# Patient Record
Sex: MALE | Race: WHITE | Employment: OTHER | URBAN - METROPOLITAN AREA
[De-identification: names, ages, dates, MRNs, and addresses within clinical notes are randomized per-mention and may not be internally consistent; named-entity substitution may affect disease eponyms.]

---

## 2017-01-04 ENCOUNTER — OFFICE VISIT (OUTPATIENT)
Dept: FAMILY MEDICINE CLINIC | Age: 82
End: 2017-01-04

## 2017-01-04 VITALS
DIASTOLIC BLOOD PRESSURE: 64 MMHG | OXYGEN SATURATION: 98 % | BODY MASS INDEX: 27.76 KG/M2 | SYSTOLIC BLOOD PRESSURE: 128 MMHG | HEART RATE: 78 BPM | WEIGHT: 188 LBS

## 2017-01-04 DIAGNOSIS — I50.9 ACUTE ON CHRONIC CONGESTIVE HEART FAILURE, UNSPECIFIED CONGESTIVE HEART FAILURE TYPE: Primary | ICD-10-CM

## 2017-01-04 PROCEDURE — 99214 OFFICE O/P EST MOD 30 MIN: CPT | Performed by: FAMILY MEDICINE

## 2017-01-24 ENCOUNTER — OFFICE VISIT (OUTPATIENT)
Dept: CARDIOLOGY CLINIC | Age: 82
End: 2017-01-24

## 2017-01-24 VITALS
SYSTOLIC BLOOD PRESSURE: 100 MMHG | HEART RATE: 80 BPM | BODY MASS INDEX: 27.08 KG/M2 | HEIGHT: 69 IN | WEIGHT: 182.8 LBS | DIASTOLIC BLOOD PRESSURE: 58 MMHG

## 2017-01-24 DIAGNOSIS — I50.23 ACUTE ON CHRONIC SYSTOLIC CONGESTIVE HEART FAILURE (HCC): Primary | ICD-10-CM

## 2017-01-24 DIAGNOSIS — I25.10 CORONARY ARTERY DISEASE INVOLVING NATIVE CORONARY ARTERY OF NATIVE HEART WITHOUT ANGINA PECTORIS: ICD-10-CM

## 2017-01-24 DIAGNOSIS — I10 ESSENTIAL HYPERTENSION, BENIGN: ICD-10-CM

## 2017-01-24 DIAGNOSIS — R06.09 DOE (DYSPNEA ON EXERTION): ICD-10-CM

## 2017-01-24 PROCEDURE — 99214 OFFICE O/P EST MOD 30 MIN: CPT | Performed by: INTERNAL MEDICINE

## 2017-02-14 DIAGNOSIS — I10 ESSENTIAL HYPERTENSION, BENIGN: ICD-10-CM

## 2017-02-14 RX ORDER — TERAZOSIN 5 MG/1
5 CAPSULE ORAL NIGHTLY
Qty: 30 CAPSULE | Refills: 3 | Status: SHIPPED | OUTPATIENT
Start: 2017-02-14 | End: 2017-06-06 | Stop reason: SDUPTHER

## 2017-02-14 RX ORDER — LOSARTAN POTASSIUM 50 MG/1
TABLET ORAL
Qty: 90 TABLET | Refills: 3 | Status: SHIPPED | OUTPATIENT
Start: 2017-02-14 | End: 2018-02-08 | Stop reason: SDUPTHER

## 2017-02-14 RX ORDER — FUROSEMIDE 20 MG/1
20 TABLET ORAL DAILY
Qty: 60 TABLET | Refills: 3 | Status: SHIPPED | OUTPATIENT
Start: 2017-02-14 | End: 2017-10-04 | Stop reason: SDUPTHER

## 2017-02-17 ENCOUNTER — TELEPHONE (OUTPATIENT)
Dept: FAMILY MEDICINE CLINIC | Age: 82
End: 2017-02-17

## 2017-03-08 ENCOUNTER — OFFICE VISIT (OUTPATIENT)
Dept: FAMILY MEDICINE CLINIC | Age: 82
End: 2017-03-08

## 2017-03-08 VITALS
OXYGEN SATURATION: 99 % | BODY MASS INDEX: 25.81 KG/M2 | WEIGHT: 174.8 LBS | DIASTOLIC BLOOD PRESSURE: 70 MMHG | SYSTOLIC BLOOD PRESSURE: 126 MMHG | HEART RATE: 66 BPM

## 2017-03-08 DIAGNOSIS — I50.9 CONGESTIVE HEART FAILURE, UNSPECIFIED CONGESTIVE HEART FAILURE CHRONICITY, UNSPECIFIED CONGESTIVE HEART FAILURE TYPE: Primary | ICD-10-CM

## 2017-03-08 PROCEDURE — 99214 OFFICE O/P EST MOD 30 MIN: CPT | Performed by: FAMILY MEDICINE

## 2017-03-08 RX ORDER — CARVEDILOL 3.12 MG/1
3.12 TABLET ORAL 2 TIMES DAILY WITH MEALS
Qty: 60 TABLET | Refills: 3 | Status: SHIPPED | OUTPATIENT
Start: 2017-03-08 | End: 2017-03-08 | Stop reason: SDUPTHER

## 2017-03-08 ASSESSMENT — ENCOUNTER SYMPTOMS: ABDOMINAL PAIN: 0

## 2017-05-22 DIAGNOSIS — G89.29 CHRONIC BILATERAL LOW BACK PAIN WITH RIGHT-SIDED SCIATICA: ICD-10-CM

## 2017-05-22 DIAGNOSIS — M54.41 CHRONIC BILATERAL LOW BACK PAIN WITH RIGHT-SIDED SCIATICA: ICD-10-CM

## 2017-05-22 RX ORDER — MELOXICAM 15 MG/1
TABLET ORAL
Qty: 90 TABLET | Refills: 0 | Status: SHIPPED | OUTPATIENT
Start: 2017-05-22 | End: 2017-07-11

## 2017-05-31 DIAGNOSIS — I10 ESSENTIAL HYPERTENSION, BENIGN: ICD-10-CM

## 2017-05-31 DIAGNOSIS — F32.A DEPRESSED: ICD-10-CM

## 2017-05-31 RX ORDER — RANITIDINE 150 MG/1
TABLET ORAL
Qty: 180 TABLET | Refills: 0 | Status: SHIPPED | OUTPATIENT
Start: 2017-05-31 | End: 2018-12-19 | Stop reason: SDUPTHER

## 2017-05-31 RX ORDER — AMLODIPINE BESYLATE 10 MG/1
TABLET ORAL
Qty: 90 TABLET | Refills: 0 | Status: SHIPPED | OUTPATIENT
Start: 2017-05-31 | End: 2019-08-05

## 2017-05-31 RX ORDER — CITALOPRAM 10 MG/1
TABLET ORAL
Qty: 90 TABLET | Refills: 0 | Status: SHIPPED | OUTPATIENT
Start: 2017-05-31 | End: 2018-12-19 | Stop reason: SDUPTHER

## 2017-06-05 RX ORDER — LEVOTHYROXINE SODIUM 175 UG/1
TABLET ORAL
Qty: 90 TABLET | Refills: 0 | Status: SHIPPED | OUTPATIENT
Start: 2017-06-05 | End: 2017-07-11 | Stop reason: SDUPTHER

## 2017-06-06 RX ORDER — TERAZOSIN 5 MG/1
CAPSULE ORAL
Qty: 30 CAPSULE | Refills: 0 | Status: SHIPPED | OUTPATIENT
Start: 2017-06-06 | End: 2017-07-03 | Stop reason: SDUPTHER

## 2017-06-12 RX ORDER — AMOXICILLIN 250 MG
CAPSULE ORAL
Qty: 60 TABLET | Refills: 0 | Status: ON HOLD | OUTPATIENT
Start: 2017-06-12 | End: 2020-06-01 | Stop reason: HOSPADM

## 2017-06-23 RX ORDER — DONEPEZIL HYDROCHLORIDE 10 MG/1
TABLET, FILM COATED ORAL
Qty: 90 TABLET | Refills: 0 | Status: SHIPPED | OUTPATIENT
Start: 2017-06-23 | End: 2017-09-18 | Stop reason: SDUPTHER

## 2017-07-03 RX ORDER — TERAZOSIN 5 MG/1
CAPSULE ORAL
Qty: 30 CAPSULE | Refills: 0 | Status: SHIPPED | OUTPATIENT
Start: 2017-07-03 | End: 2018-02-12

## 2017-07-10 ENCOUNTER — OFFICE VISIT (OUTPATIENT)
Dept: FAMILY MEDICINE CLINIC | Age: 82
End: 2017-07-10

## 2017-07-10 VITALS
DIASTOLIC BLOOD PRESSURE: 78 MMHG | WEIGHT: 170 LBS | BODY MASS INDEX: 25.1 KG/M2 | HEART RATE: 58 BPM | RESPIRATION RATE: 16 BRPM | SYSTOLIC BLOOD PRESSURE: 122 MMHG | OXYGEN SATURATION: 98 %

## 2017-07-10 DIAGNOSIS — E03.9 HYPOTHYROIDISM, UNSPECIFIED TYPE: Primary | ICD-10-CM

## 2017-07-10 DIAGNOSIS — I10 ESSENTIAL HYPERTENSION, BENIGN: ICD-10-CM

## 2017-07-10 LAB
A/G RATIO: 1.6 (ref 1.1–2.2)
ALBUMIN SERPL-MCNC: 4.4 G/DL (ref 3.4–5)
ALP BLD-CCNC: 78 U/L (ref 40–129)
ALT SERPL-CCNC: 10 U/L (ref 10–40)
ANION GAP SERPL CALCULATED.3IONS-SCNC: 16 MMOL/L (ref 3–16)
AST SERPL-CCNC: 12 U/L (ref 15–37)
BASOPHILS ABSOLUTE: 0.1 K/UL (ref 0–0.2)
BASOPHILS RELATIVE PERCENT: 1.2 %
BILIRUB SERPL-MCNC: 0.4 MG/DL (ref 0–1)
BUN BLDV-MCNC: 31 MG/DL (ref 7–20)
CALCIUM SERPL-MCNC: 8.7 MG/DL (ref 8.3–10.6)
CHLORIDE BLD-SCNC: 103 MMOL/L (ref 99–110)
CO2: 25 MMOL/L (ref 21–32)
CREAT SERPL-MCNC: 2.4 MG/DL (ref 0.8–1.3)
EOSINOPHILS ABSOLUTE: 0.4 K/UL (ref 0–0.6)
EOSINOPHILS RELATIVE PERCENT: 6.6 %
GFR AFRICAN AMERICAN: 32
GFR NON-AFRICAN AMERICAN: 26
GLOBULIN: 2.7 G/DL
GLUCOSE BLD-MCNC: 85 MG/DL (ref 70–99)
HCT VFR BLD CALC: 36.6 % (ref 40.5–52.5)
HEMOGLOBIN: 12.2 G/DL (ref 13.5–17.5)
LYMPHOCYTES ABSOLUTE: 1.7 K/UL (ref 1–5.1)
LYMPHOCYTES RELATIVE PERCENT: 27 %
MCH RBC QN AUTO: 30.8 PG (ref 26–34)
MCHC RBC AUTO-ENTMCNC: 33.4 G/DL (ref 31–36)
MCV RBC AUTO: 92.2 FL (ref 80–100)
MONOCYTES ABSOLUTE: 0.5 K/UL (ref 0–1.3)
MONOCYTES RELATIVE PERCENT: 7.4 %
NEUTROPHILS ABSOLUTE: 3.6 K/UL (ref 1.7–7.7)
NEUTROPHILS RELATIVE PERCENT: 57.8 %
PDW BLD-RTO: 13.6 % (ref 12.4–15.4)
PLATELET # BLD: 166 K/UL (ref 135–450)
PMV BLD AUTO: 7.9 FL (ref 5–10.5)
POTASSIUM SERPL-SCNC: 5.1 MMOL/L (ref 3.5–5.1)
RBC # BLD: 3.96 M/UL (ref 4.2–5.9)
SODIUM BLD-SCNC: 144 MMOL/L (ref 136–145)
T4 FREE: 2.3 NG/DL (ref 0.9–1.8)
TOTAL PROTEIN: 7.1 G/DL (ref 6.4–8.2)
TSH SERPL DL<=0.05 MIU/L-ACNC: 0.12 UIU/ML (ref 0.27–4.2)
WBC # BLD: 6.2 K/UL (ref 4–11)

## 2017-07-10 PROCEDURE — 99214 OFFICE O/P EST MOD 30 MIN: CPT | Performed by: FAMILY MEDICINE

## 2017-07-10 PROCEDURE — 36415 COLL VENOUS BLD VENIPUNCTURE: CPT | Performed by: FAMILY MEDICINE

## 2017-07-11 RX ORDER — LEVOTHYROXINE SODIUM 0.15 MG/1
TABLET ORAL
Qty: 90 TABLET | Refills: 3 | Status: SHIPPED | OUTPATIENT
Start: 2017-07-11 | End: 2018-02-20 | Stop reason: SDUPTHER

## 2017-07-11 RX ORDER — LEVOTHYROXINE SODIUM 0.15 MG/1
150 TABLET ORAL DAILY
Qty: 30 TABLET | Refills: 3 | Status: SHIPPED | OUTPATIENT
Start: 2017-07-11 | End: 2017-07-11 | Stop reason: SDUPTHER

## 2017-07-11 ASSESSMENT — ENCOUNTER SYMPTOMS: BLURRED VISION: 0

## 2017-07-27 ENCOUNTER — OFFICE VISIT (OUTPATIENT)
Dept: FAMILY MEDICINE CLINIC | Age: 82
End: 2017-07-27

## 2017-07-27 VITALS
BODY MASS INDEX: 25.1 KG/M2 | HEART RATE: 58 BPM | OXYGEN SATURATION: 98 % | WEIGHT: 170 LBS | RESPIRATION RATE: 16 BRPM | SYSTOLIC BLOOD PRESSURE: 110 MMHG | DIASTOLIC BLOOD PRESSURE: 70 MMHG | TEMPERATURE: 98.5 F

## 2017-07-27 DIAGNOSIS — R09.89 CHEST CONGESTION: ICD-10-CM

## 2017-07-27 DIAGNOSIS — R05.9 COUGH: Primary | ICD-10-CM

## 2017-07-27 DIAGNOSIS — H61.23 IMPACTED CERUMEN OF BOTH EARS: ICD-10-CM

## 2017-07-27 PROCEDURE — 99214 OFFICE O/P EST MOD 30 MIN: CPT | Performed by: NURSE PRACTITIONER

## 2017-07-27 PROCEDURE — 69209 REMOVE IMPACTED EAR WAX UNI: CPT | Performed by: NURSE PRACTITIONER

## 2017-07-27 RX ORDER — FLUTICASONE PROPIONATE 50 MCG
1 SPRAY, SUSPENSION (ML) NASAL DAILY
Qty: 1 BOTTLE | Refills: 3 | Status: SHIPPED | OUTPATIENT
Start: 2017-07-27 | End: 2017-11-15 | Stop reason: SDUPTHER

## 2017-07-27 RX ORDER — AZITHROMYCIN 250 MG/1
TABLET, FILM COATED ORAL
Qty: 1 PACKET | Refills: 0 | Status: SHIPPED | OUTPATIENT
Start: 2017-07-27 | End: 2017-11-10 | Stop reason: ALTCHOICE

## 2017-07-27 ASSESSMENT — ENCOUNTER SYMPTOMS
COUGH: 1
SPUTUM PRODUCTION: 1
SORE THROAT: 0
SHORTNESS OF BREATH: 1

## 2017-07-31 RX ORDER — TERAZOSIN 5 MG/1
CAPSULE ORAL
Qty: 30 CAPSULE | Refills: 3 | Status: SHIPPED | OUTPATIENT
Start: 2017-07-31 | End: 2017-11-10 | Stop reason: SDUPTHER

## 2017-08-02 ENCOUNTER — TELEPHONE (OUTPATIENT)
Dept: FAMILY MEDICINE CLINIC | Age: 82
End: 2017-08-02

## 2017-08-02 RX ORDER — AMOXICILLIN 875 MG/1
875 TABLET, COATED ORAL 2 TIMES DAILY
Qty: 20 TABLET | Refills: 0 | Status: SHIPPED | OUTPATIENT
Start: 2017-08-02 | End: 2017-08-12

## 2017-08-17 DIAGNOSIS — M54.41 CHRONIC BILATERAL LOW BACK PAIN WITH RIGHT-SIDED SCIATICA: ICD-10-CM

## 2017-08-17 DIAGNOSIS — G89.29 CHRONIC BILATERAL LOW BACK PAIN WITH RIGHT-SIDED SCIATICA: ICD-10-CM

## 2017-08-17 RX ORDER — MELOXICAM 15 MG/1
TABLET ORAL
Qty: 90 TABLET | Refills: 0 | Status: SHIPPED | OUTPATIENT
Start: 2017-08-17 | End: 2018-02-12

## 2017-08-26 DIAGNOSIS — I10 ESSENTIAL HYPERTENSION, BENIGN: ICD-10-CM

## 2017-08-26 DIAGNOSIS — F32.A DEPRESSED: ICD-10-CM

## 2017-08-28 RX ORDER — CITALOPRAM 10 MG/1
TABLET ORAL
Qty: 90 TABLET | Refills: 1 | Status: SHIPPED | OUTPATIENT
Start: 2017-08-28 | End: 2017-11-10 | Stop reason: SDUPTHER

## 2017-08-28 RX ORDER — RANITIDINE 150 MG/1
TABLET ORAL
Qty: 180 TABLET | Refills: 1 | Status: SHIPPED | OUTPATIENT
Start: 2017-08-28 | End: 2017-11-10 | Stop reason: SDUPTHER

## 2017-08-28 RX ORDER — AMLODIPINE BESYLATE 10 MG/1
TABLET ORAL
Qty: 90 TABLET | Refills: 1 | Status: SHIPPED | OUTPATIENT
Start: 2017-08-28 | End: 2017-11-10 | Stop reason: SDUPTHER

## 2017-08-31 RX ORDER — CARVEDILOL 3.12 MG/1
TABLET ORAL
Qty: 180 TABLET | Refills: 0 | Status: SHIPPED | OUTPATIENT
Start: 2017-08-31 | End: 2017-11-27 | Stop reason: SDUPTHER

## 2017-09-18 RX ORDER — DONEPEZIL HYDROCHLORIDE 10 MG/1
TABLET, FILM COATED ORAL
Qty: 90 TABLET | Refills: 0 | Status: SHIPPED | OUTPATIENT
Start: 2017-09-18 | End: 2017-12-17 | Stop reason: SDUPTHER

## 2017-10-04 RX ORDER — FUROSEMIDE 20 MG/1
TABLET ORAL
Qty: 60 TABLET | Refills: 0 | Status: SHIPPED | OUTPATIENT
Start: 2017-10-04 | End: 2017-12-01 | Stop reason: SDUPTHER

## 2017-11-10 ENCOUNTER — OFFICE VISIT (OUTPATIENT)
Dept: FAMILY MEDICINE CLINIC | Age: 82
End: 2017-11-10

## 2017-11-10 VITALS
HEART RATE: 76 BPM | WEIGHT: 174.2 LBS | BODY MASS INDEX: 25.72 KG/M2 | DIASTOLIC BLOOD PRESSURE: 72 MMHG | SYSTOLIC BLOOD PRESSURE: 138 MMHG

## 2017-11-10 DIAGNOSIS — E78.00 PURE HYPERCHOLESTEROLEMIA: ICD-10-CM

## 2017-11-10 DIAGNOSIS — E03.9 HYPOTHYROIDISM, UNSPECIFIED TYPE: ICD-10-CM

## 2017-11-10 DIAGNOSIS — R41.89 COGNITIVE IMPAIRMENT: ICD-10-CM

## 2017-11-10 DIAGNOSIS — I10 ESSENTIAL HYPERTENSION, BENIGN: Primary | ICD-10-CM

## 2017-11-10 DIAGNOSIS — Z23 NEED FOR IMMUNIZATION AGAINST INFLUENZA: ICD-10-CM

## 2017-11-10 DIAGNOSIS — R26.2 AMBULATORY DYSFUNCTION: ICD-10-CM

## 2017-11-10 LAB
A/G RATIO: 1.7 (ref 1.1–2.2)
ALBUMIN SERPL-MCNC: 4 G/DL (ref 3.4–5)
ALP BLD-CCNC: 70 U/L (ref 40–129)
ALT SERPL-CCNC: <5 U/L (ref 10–40)
ANION GAP SERPL CALCULATED.3IONS-SCNC: 14 MMOL/L (ref 3–16)
AST SERPL-CCNC: 10 U/L (ref 15–37)
BILIRUB SERPL-MCNC: 0.4 MG/DL (ref 0–1)
BUN BLDV-MCNC: 30 MG/DL (ref 7–20)
CALCIUM SERPL-MCNC: 8.3 MG/DL (ref 8.3–10.6)
CHLORIDE BLD-SCNC: 103 MMOL/L (ref 99–110)
CO2: 25 MMOL/L (ref 21–32)
CREAT SERPL-MCNC: 2.4 MG/DL (ref 0.8–1.3)
GFR AFRICAN AMERICAN: 32
GFR NON-AFRICAN AMERICAN: 26
GLOBULIN: 2.4 G/DL
GLUCOSE BLD-MCNC: 93 MG/DL (ref 70–99)
POTASSIUM SERPL-SCNC: 4.7 MMOL/L (ref 3.5–5.1)
SODIUM BLD-SCNC: 142 MMOL/L (ref 136–145)
T4 FREE: 1.2 NG/DL (ref 0.9–1.8)
TOTAL PROTEIN: 6.4 G/DL (ref 6.4–8.2)
TSH REFLEX: 11.95 UIU/ML (ref 0.27–4.2)

## 2017-11-10 PROCEDURE — 99214 OFFICE O/P EST MOD 30 MIN: CPT | Performed by: FAMILY MEDICINE

## 2017-11-10 PROCEDURE — 36415 COLL VENOUS BLD VENIPUNCTURE: CPT | Performed by: FAMILY MEDICINE

## 2017-11-10 ASSESSMENT — PATIENT HEALTH QUESTIONNAIRE - PHQ9
1. LITTLE INTEREST OR PLEASURE IN DOING THINGS: 0
SUM OF ALL RESPONSES TO PHQ QUESTIONS 1-9: 0
SUM OF ALL RESPONSES TO PHQ9 QUESTIONS 1 & 2: 0
2. FEELING DOWN, DEPRESSED OR HOPELESS: 0

## 2017-11-10 NOTE — PROGRESS NOTES
Vaccine Information Sheet, \"Influenza - Inactivated\"  given to Tomás Stacy, or parent/legal guardian of  Tomás Stacy and verbalized understanding. Patient responses:    Have you ever had a reaction to a flu vaccine? No  Are you able to eat eggs without adverse effects? Yes  Do you have any current illness? No  Have you ever had Guillian [place] Syndrome? No    Flu vaccine given per order. Please see immunization tab.

## 2017-11-13 DIAGNOSIS — M54.41 CHRONIC BILATERAL LOW BACK PAIN WITH RIGHT-SIDED SCIATICA: ICD-10-CM

## 2017-11-13 DIAGNOSIS — G89.29 CHRONIC BILATERAL LOW BACK PAIN WITH RIGHT-SIDED SCIATICA: ICD-10-CM

## 2017-11-13 PROCEDURE — 90662 IIV NO PRSV INCREASED AG IM: CPT | Performed by: FAMILY MEDICINE

## 2017-11-13 PROCEDURE — G0008 ADMIN INFLUENZA VIRUS VAC: HCPCS | Performed by: FAMILY MEDICINE

## 2017-11-13 RX ORDER — MELOXICAM 15 MG/1
15 TABLET ORAL DAILY
Qty: 90 TABLET | Refills: 0 | Status: ON HOLD | OUTPATIENT
Start: 2017-11-13 | End: 2019-12-01 | Stop reason: HOSPADM

## 2017-11-13 NOTE — PROGRESS NOTES
Subjective:      Patient ID: Rock Pugh is a [de-identified] y.o. male. Rock Pugh is a [de-identified] y.o. male. Patient presents with:  Hyperlipidemia  Hypertension      29-year-old male presents for follow-up of multiple medical issues. He has essential hypertension and this is been well-controlled on current medications. He notes no chest pain or shortness of breath. He continues on medication for his hyperlipidemia as well. Hypothyroidism has been well-controlled. He denies any cold or heat dysfunction. He has been having more difficulty with ambulation recently. He notes that he has fallen a couple times. He has slight gait instability. He does not use a walker. He does have a cane but has not been using that as well. The patients PMH, surgical history, family history, medications, allergies were all reviewed and updated as appropriate today. Review of Systems    Objective:   Physical Exam   Constitutional: He is oriented to person, place, and time. He appears well-developed and well-nourished. HENT:   Head: Normocephalic and atraumatic. Right Ear: External ear normal.   Left Ear: External ear normal.   Nose: Nose normal.   Mouth/Throat: Oropharynx is clear and moist.   Eyes: Conjunctivae and EOM are normal. Pupils are equal, round, and reactive to light. Neck: Normal range of motion. Neck supple. Cardiovascular: Normal rate, regular rhythm, normal heart sounds and intact distal pulses. Exam reveals no gallop and no friction rub. No murmur heard. Pulmonary/Chest: Effort normal and breath sounds normal. No respiratory distress. He has no wheezes. Abdominal: Soft. Bowel sounds are normal. He exhibits no distension. There is no tenderness. Musculoskeletal: Normal range of motion. He exhibits no edema or tenderness. Gait is somewhat unsteady. There is some weakness in his legs. Neurological: He is alert and oriented to person, place, and time. He has normal reflexes. Skin: Skin is warm and dry. Psychiatric: He has a normal mood and affect. His behavior is normal. Judgment and thought content normal.       Assessment:      Encounter Diagnoses   Name Primary?  Essential hypertension, benign Yes    Pure hypercholesterolemia     Hypothyroidism, unspecified type     Cognitive impairment     Need for immunization against influenza     Ambulatory dysfunction            Plan:      1. Essential hypertension, benign   stable continue current medications check labs today.  - COMPREHENSIVE METABOLIC PANEL    2. Pure hypercholesterolemia   Stable continue current medication    3. Hypothyroidism, unspecified type   check labs today.   - TSH with Reflex  - T4, FREE    4. Cognitive impairment  Currently stable.     5. Need for immunization against influenza    - INFLUENZA, HIGH DOSE, 65 YRS +, IM, PF, PREFILL SYR, 0.5ML (FLUZONE HD)    6. Ambulatory dysfunction   home physical therapy  - External Referral To Physical Therapy

## 2017-11-15 DIAGNOSIS — R09.89 CHEST CONGESTION: ICD-10-CM

## 2017-11-15 DIAGNOSIS — R05.9 COUGH: ICD-10-CM

## 2017-11-15 RX ORDER — FLUTICASONE PROPIONATE 50 MCG
SPRAY, SUSPENSION (ML) NASAL
Qty: 1 BOTTLE | Refills: 2 | Status: SHIPPED | OUTPATIENT
Start: 2017-11-15 | End: 2017-11-15 | Stop reason: SDUPTHER

## 2017-11-15 RX ORDER — FLUTICASONE PROPIONATE 50 MCG
SPRAY, SUSPENSION (ML) NASAL
Qty: 1 BOTTLE | Refills: 2 | Status: ON HOLD | OUTPATIENT
Start: 2017-11-15 | End: 2020-06-01 | Stop reason: HOSPADM

## 2017-11-20 RX ORDER — TERAZOSIN 5 MG/1
CAPSULE ORAL
Qty: 30 CAPSULE | Refills: 2 | Status: SHIPPED | OUTPATIENT
Start: 2017-11-20 | End: 2019-01-14 | Stop reason: SDUPTHER

## 2017-11-22 ENCOUNTER — TELEPHONE (OUTPATIENT)
Dept: FAMILY MEDICINE CLINIC | Age: 82
End: 2017-11-22

## 2017-11-27 RX ORDER — CARVEDILOL 3.12 MG/1
TABLET ORAL
Qty: 180 TABLET | Refills: 0 | Status: SHIPPED | OUTPATIENT
Start: 2017-11-27 | End: 2018-02-23 | Stop reason: SDUPTHER

## 2017-12-01 RX ORDER — FUROSEMIDE 20 MG/1
TABLET ORAL
Qty: 60 TABLET | Refills: 0 | Status: SHIPPED | OUTPATIENT
Start: 2017-12-01 | End: 2018-01-29 | Stop reason: SDUPTHER

## 2017-12-03 DIAGNOSIS — E78.00 PURE HYPERCHOLESTEROLEMIA: ICD-10-CM

## 2017-12-04 RX ORDER — SIMVASTATIN 40 MG
TABLET ORAL
Qty: 90 TABLET | Refills: 0 | Status: SHIPPED | OUTPATIENT
Start: 2017-12-04 | End: 2018-03-01 | Stop reason: SDUPTHER

## 2017-12-06 PROCEDURE — G0180 MD CERTIFICATION HHA PATIENT: HCPCS | Performed by: FAMILY MEDICINE

## 2017-12-07 ENCOUNTER — TELEPHONE (OUTPATIENT)
Dept: FAMILY MEDICINE CLINIC | Age: 82
End: 2017-12-07

## 2017-12-07 DIAGNOSIS — M17.0 PRIMARY OSTEOARTHRITIS OF BOTH KNEES: Primary | ICD-10-CM

## 2017-12-18 RX ORDER — DONEPEZIL HYDROCHLORIDE 10 MG/1
TABLET, FILM COATED ORAL
Qty: 90 TABLET | Refills: 0 | Status: SHIPPED | OUTPATIENT
Start: 2017-12-18 | End: 2018-03-15 | Stop reason: SDUPTHER

## 2017-12-21 ENCOUNTER — TELEPHONE (OUTPATIENT)
Dept: FAMILY MEDICINE CLINIC | Age: 82
End: 2017-12-21

## 2017-12-21 ENCOUNTER — OFFICE VISIT (OUTPATIENT)
Dept: FAMILY MEDICINE CLINIC | Age: 82
End: 2017-12-21

## 2017-12-21 VITALS
OXYGEN SATURATION: 98 % | SYSTOLIC BLOOD PRESSURE: 114 MMHG | DIASTOLIC BLOOD PRESSURE: 70 MMHG | BODY MASS INDEX: 26.14 KG/M2 | HEART RATE: 59 BPM | WEIGHT: 177 LBS

## 2017-12-21 DIAGNOSIS — J40 BRONCHITIS: Primary | ICD-10-CM

## 2017-12-21 PROCEDURE — 99214 OFFICE O/P EST MOD 30 MIN: CPT | Performed by: FAMILY MEDICINE

## 2017-12-21 RX ORDER — LEVOFLOXACIN 500 MG/1
500 TABLET, FILM COATED ORAL DAILY
Qty: 7 TABLET | Refills: 0 | Status: SHIPPED | OUTPATIENT
Start: 2017-12-21 | End: 2017-12-28

## 2017-12-21 NOTE — TELEPHONE ENCOUNTER
Patient's daughter called this morning stating that her father will be seeing Dr. Jesse Mooney today for a sick visit. She said that her dad has a memory problem so she would like to get a call back after her dad is seen so that she can be informed of what he needs to do. She said that if Dr. Jesse Mooney prescribes medication for him she said to send it to the pharmacy and they will deliver the medication.     Thanks

## 2017-12-23 ASSESSMENT — ENCOUNTER SYMPTOMS
COUGH: 1
HEMOPTYSIS: 0
WHEEZING: 0
SORE THROAT: 0
SHORTNESS OF BREATH: 1
RHINORRHEA: 1

## 2018-01-29 RX ORDER — FUROSEMIDE 20 MG/1
TABLET ORAL
Qty: 60 TABLET | Refills: 0 | Status: SHIPPED | OUTPATIENT
Start: 2018-01-29 | End: 2018-01-29 | Stop reason: SDUPTHER

## 2018-01-31 RX ORDER — FUROSEMIDE 20 MG/1
TABLET ORAL
Qty: 90 TABLET | Refills: 0 | Status: SHIPPED | OUTPATIENT
Start: 2018-01-31 | End: 2018-04-28 | Stop reason: SDUPTHER

## 2018-02-08 DIAGNOSIS — I10 ESSENTIAL HYPERTENSION, BENIGN: ICD-10-CM

## 2018-02-08 RX ORDER — LOSARTAN POTASSIUM 50 MG/1
TABLET ORAL
Qty: 90 TABLET | Refills: 0 | Status: SHIPPED | OUTPATIENT
Start: 2018-02-08 | End: 2018-05-09 | Stop reason: SDUPTHER

## 2018-02-09 ENCOUNTER — TELEPHONE (OUTPATIENT)
Dept: FAMILY MEDICINE CLINIC | Age: 83
End: 2018-02-09

## 2018-02-09 DIAGNOSIS — E78.00 HYPERCHOLESTEROLEMIA: Primary | ICD-10-CM

## 2018-02-09 DIAGNOSIS — I10 ESSENTIAL HYPERTENSION, BENIGN: ICD-10-CM

## 2018-02-09 DIAGNOSIS — E03.9 HYPOTHYROIDISM, UNSPECIFIED TYPE: ICD-10-CM

## 2018-02-12 RX ORDER — TERAZOSIN 5 MG/1
CAPSULE ORAL
Qty: 30 CAPSULE | Refills: 0 | Status: SHIPPED | OUTPATIENT
Start: 2018-02-12 | End: 2018-09-06 | Stop reason: SDUPTHER

## 2018-02-19 ENCOUNTER — OFFICE VISIT (OUTPATIENT)
Dept: FAMILY MEDICINE CLINIC | Age: 83
End: 2018-02-19

## 2018-02-19 VITALS
HEIGHT: 69 IN | OXYGEN SATURATION: 98 % | DIASTOLIC BLOOD PRESSURE: 70 MMHG | SYSTOLIC BLOOD PRESSURE: 130 MMHG | BODY MASS INDEX: 26.66 KG/M2 | WEIGHT: 180 LBS | HEART RATE: 78 BPM

## 2018-02-19 DIAGNOSIS — F32.A DEPRESSED: ICD-10-CM

## 2018-02-19 DIAGNOSIS — E03.9 HYPOTHYROIDISM, UNSPECIFIED TYPE: ICD-10-CM

## 2018-02-19 DIAGNOSIS — I10 ESSENTIAL HYPERTENSION, BENIGN: ICD-10-CM

## 2018-02-19 DIAGNOSIS — E78.00 HYPERCHOLESTEROLEMIA: ICD-10-CM

## 2018-02-19 DIAGNOSIS — I10 ESSENTIAL HYPERTENSION, BENIGN: Primary | ICD-10-CM

## 2018-02-19 LAB
A/G RATIO: 1.7 (ref 1.1–2.2)
ALBUMIN SERPL-MCNC: 4.3 G/DL (ref 3.4–5)
ALP BLD-CCNC: 69 U/L (ref 40–129)
ALT SERPL-CCNC: 7 U/L (ref 10–40)
ANION GAP SERPL CALCULATED.3IONS-SCNC: 16 MMOL/L (ref 3–16)
AST SERPL-CCNC: 10 U/L (ref 15–37)
BILIRUB SERPL-MCNC: 0.3 MG/DL (ref 0–1)
BUN BLDV-MCNC: 31 MG/DL (ref 7–20)
CALCIUM SERPL-MCNC: 8.8 MG/DL (ref 8.3–10.6)
CHLORIDE BLD-SCNC: 102 MMOL/L (ref 99–110)
CHOLESTEROL, FASTING: 197 MG/DL (ref 0–199)
CO2: 23 MMOL/L (ref 21–32)
CREAT SERPL-MCNC: 2.6 MG/DL (ref 0.8–1.3)
GFR AFRICAN AMERICAN: 29
GFR NON-AFRICAN AMERICAN: 24
GLOBULIN: 2.5 G/DL
GLUCOSE BLD-MCNC: 85 MG/DL (ref 70–99)
HDLC SERPL-MCNC: 45 MG/DL (ref 40–60)
LDL CHOLESTEROL CALCULATED: 109 MG/DL
POTASSIUM SERPL-SCNC: 4.9 MMOL/L (ref 3.5–5.1)
SODIUM BLD-SCNC: 141 MMOL/L (ref 136–145)
TOTAL PROTEIN: 6.8 G/DL (ref 6.4–8.2)
TRIGLYCERIDE, FASTING: 216 MG/DL (ref 0–150)
TSH REFLEX: 20.75 UIU/ML (ref 0.27–4.2)
VLDLC SERPL CALC-MCNC: 43 MG/DL

## 2018-02-19 PROCEDURE — 99213 OFFICE O/P EST LOW 20 MIN: CPT | Performed by: FAMILY MEDICINE

## 2018-02-19 RX ORDER — CITALOPRAM 10 MG/1
10 TABLET ORAL DAILY
Qty: 90 TABLET | Refills: 0 | Status: SHIPPED | OUTPATIENT
Start: 2018-02-19 | End: 2018-05-17 | Stop reason: SDUPTHER

## 2018-02-19 RX ORDER — AMLODIPINE BESYLATE 10 MG/1
TABLET ORAL
Qty: 90 TABLET | Refills: 0 | Status: SHIPPED | OUTPATIENT
Start: 2018-02-19 | End: 2018-05-17 | Stop reason: SDUPTHER

## 2018-02-19 RX ORDER — RANITIDINE 150 MG/1
TABLET ORAL
Qty: 180 TABLET | Refills: 0 | Status: SHIPPED | OUTPATIENT
Start: 2018-02-19 | End: 2018-05-17 | Stop reason: SDUPTHER

## 2018-02-20 LAB — T4 FREE: 1.1 NG/DL (ref 0.9–1.8)

## 2018-02-20 RX ORDER — LEVOTHYROXINE SODIUM 175 UG/1
175 TABLET ORAL DAILY
Qty: 90 TABLET | Refills: 3 | Status: SHIPPED | OUTPATIENT
Start: 2018-02-20 | End: 2018-06-28 | Stop reason: SDUPTHER

## 2018-02-23 RX ORDER — CARVEDILOL 3.12 MG/1
TABLET ORAL
Qty: 180 TABLET | Refills: 0 | Status: SHIPPED | OUTPATIENT
Start: 2018-02-23 | End: 2018-05-21 | Stop reason: SDUPTHER

## 2018-03-01 DIAGNOSIS — E78.00 PURE HYPERCHOLESTEROLEMIA: ICD-10-CM

## 2018-03-01 RX ORDER — SIMVASTATIN 40 MG
TABLET ORAL
Qty: 90 TABLET | Refills: 0 | Status: SHIPPED | OUTPATIENT
Start: 2018-03-01 | End: 2018-05-27 | Stop reason: SDUPTHER

## 2018-03-13 RX ORDER — TERAZOSIN 5 MG/1
CAPSULE ORAL
Qty: 30 CAPSULE | Refills: 0 | Status: SHIPPED | OUTPATIENT
Start: 2018-03-13 | End: 2018-09-06

## 2018-03-15 RX ORDER — DONEPEZIL HYDROCHLORIDE 10 MG/1
TABLET, FILM COATED ORAL
Qty: 90 TABLET | Refills: 0 | Status: SHIPPED | OUTPATIENT
Start: 2018-03-15 | End: 2018-06-10 | Stop reason: SDUPTHER

## 2018-04-10 RX ORDER — TERAZOSIN 5 MG/1
CAPSULE ORAL
Qty: 30 CAPSULE | Refills: 0 | Status: SHIPPED | OUTPATIENT
Start: 2018-04-10 | End: 2018-09-06 | Stop reason: SDUPTHER

## 2018-04-30 RX ORDER — FUROSEMIDE 20 MG/1
TABLET ORAL
Qty: 90 TABLET | Refills: 0 | Status: SHIPPED | OUTPATIENT
Start: 2018-04-30 | End: 2018-07-26 | Stop reason: SDUPTHER

## 2018-05-07 RX ORDER — TERAZOSIN 5 MG/1
CAPSULE ORAL
Qty: 30 CAPSULE | Refills: 0 | Status: SHIPPED | OUTPATIENT
Start: 2018-05-07 | End: 2018-09-06 | Stop reason: SDUPTHER

## 2018-05-09 DIAGNOSIS — I10 ESSENTIAL HYPERTENSION, BENIGN: ICD-10-CM

## 2018-05-09 RX ORDER — LOSARTAN POTASSIUM 50 MG/1
TABLET ORAL
Qty: 90 TABLET | Refills: 1 | Status: SHIPPED | OUTPATIENT
Start: 2018-05-09 | End: 2018-10-31 | Stop reason: SDUPTHER

## 2018-05-17 DIAGNOSIS — I10 ESSENTIAL HYPERTENSION, BENIGN: ICD-10-CM

## 2018-05-17 DIAGNOSIS — F32.A DEPRESSED: ICD-10-CM

## 2018-05-17 RX ORDER — CITALOPRAM 10 MG/1
10 TABLET ORAL DAILY
Qty: 90 TABLET | Refills: 0 | Status: SHIPPED | OUTPATIENT
Start: 2018-05-17 | End: 2018-08-12 | Stop reason: SDUPTHER

## 2018-05-17 RX ORDER — RANITIDINE 150 MG/1
TABLET ORAL
Qty: 180 TABLET | Refills: 0 | Status: SHIPPED | OUTPATIENT
Start: 2018-05-17 | End: 2018-08-12 | Stop reason: SDUPTHER

## 2018-05-17 RX ORDER — AMLODIPINE BESYLATE 10 MG/1
TABLET ORAL
Qty: 90 TABLET | Refills: 0 | Status: SHIPPED | OUTPATIENT
Start: 2018-05-17 | End: 2018-08-12 | Stop reason: SDUPTHER

## 2018-05-21 RX ORDER — CARVEDILOL 3.12 MG/1
TABLET ORAL
Qty: 180 TABLET | Refills: 0 | Status: SHIPPED | OUTPATIENT
Start: 2018-05-21 | End: 2018-08-16 | Stop reason: SDUPTHER

## 2018-05-27 DIAGNOSIS — E78.00 PURE HYPERCHOLESTEROLEMIA: ICD-10-CM

## 2018-05-29 RX ORDER — SIMVASTATIN 40 MG
TABLET ORAL
Qty: 90 TABLET | Refills: 0 | Status: SHIPPED | OUTPATIENT
Start: 2018-05-29 | End: 2018-08-25 | Stop reason: SDUPTHER

## 2018-06-04 RX ORDER — TERAZOSIN 5 MG/1
CAPSULE ORAL
Qty: 30 CAPSULE | Refills: 0 | Status: SHIPPED | OUTPATIENT
Start: 2018-06-04 | End: 2018-09-06 | Stop reason: SDUPTHER

## 2018-06-11 RX ORDER — DONEPEZIL HYDROCHLORIDE 10 MG/1
TABLET, FILM COATED ORAL
Qty: 90 TABLET | Refills: 0 | Status: SHIPPED | OUTPATIENT
Start: 2018-06-11 | End: 2018-09-06 | Stop reason: SDUPTHER

## 2018-06-28 DIAGNOSIS — E03.9 HYPOTHYROIDISM, UNSPECIFIED TYPE: Primary | ICD-10-CM

## 2018-06-28 RX ORDER — LEVOTHYROXINE SODIUM 175 UG/1
175 TABLET ORAL DAILY
Qty: 90 TABLET | Refills: 3 | Status: SHIPPED | OUTPATIENT
Start: 2018-06-28 | End: 2018-07-02 | Stop reason: SDUPTHER

## 2018-06-28 RX ORDER — LEVOTHYROXINE SODIUM 0.15 MG/1
TABLET ORAL
Qty: 90 TABLET | Refills: 0 | OUTPATIENT
Start: 2018-06-28

## 2018-06-29 ENCOUNTER — NURSE ONLY (OUTPATIENT)
Dept: FAMILY MEDICINE CLINIC | Age: 83
End: 2018-06-29

## 2018-06-29 DIAGNOSIS — E03.9 HYPOTHYROIDISM, UNSPECIFIED TYPE: Primary | ICD-10-CM

## 2018-06-29 DIAGNOSIS — E03.9 HYPOTHYROIDISM, UNSPECIFIED TYPE: ICD-10-CM

## 2018-06-29 LAB
T4 FREE: 1.6 NG/DL (ref 0.9–1.8)
TSH SERPL DL<=0.05 MIU/L-ACNC: 5.37 UIU/ML (ref 0.27–4.2)

## 2018-06-29 PROCEDURE — 36415 COLL VENOUS BLD VENIPUNCTURE: CPT | Performed by: NURSE PRACTITIONER

## 2018-07-02 DIAGNOSIS — E03.9 HYPOTHYROIDISM, UNSPECIFIED TYPE: Primary | ICD-10-CM

## 2018-07-02 RX ORDER — LEVOTHYROXINE SODIUM 0.2 MG/1
200 TABLET ORAL DAILY
Qty: 90 TABLET | Refills: 3 | Status: SHIPPED | OUTPATIENT
Start: 2018-07-02 | End: 2019-01-07 | Stop reason: SDUPTHER

## 2018-07-26 RX ORDER — FUROSEMIDE 20 MG/1
TABLET ORAL
Qty: 90 TABLET | Refills: 0 | Status: SHIPPED | OUTPATIENT
Start: 2018-07-26 | End: 2018-10-23 | Stop reason: SDUPTHER

## 2018-08-25 DIAGNOSIS — E78.00 PURE HYPERCHOLESTEROLEMIA: ICD-10-CM

## 2018-08-27 RX ORDER — SIMVASTATIN 40 MG
TABLET ORAL
Qty: 90 TABLET | Refills: 0 | Status: SHIPPED | OUTPATIENT
Start: 2018-08-27 | End: 2018-11-22 | Stop reason: SDUPTHER

## 2018-09-06 ENCOUNTER — HOSPITAL ENCOUNTER (OUTPATIENT)
Dept: GENERAL RADIOLOGY | Age: 83
Discharge: HOME | End: 2018-09-06
Payer: MEDICARE

## 2018-09-06 ENCOUNTER — OFFICE VISIT (OUTPATIENT)
Dept: FAMILY MEDICINE CLINIC | Age: 83
End: 2018-09-06

## 2018-09-06 VITALS
WEIGHT: 170 LBS | OXYGEN SATURATION: 98 % | BODY MASS INDEX: 25.1 KG/M2 | SYSTOLIC BLOOD PRESSURE: 118 MMHG | DIASTOLIC BLOOD PRESSURE: 78 MMHG | HEART RATE: 68 BPM

## 2018-09-06 DIAGNOSIS — S69.92XA INJURY, FINGER, LEFT, INITIAL ENCOUNTER: ICD-10-CM

## 2018-09-06 DIAGNOSIS — S69.92XA INJURY, FINGER, LEFT, INITIAL ENCOUNTER: Primary | ICD-10-CM

## 2018-09-06 PROCEDURE — 99213 OFFICE O/P EST LOW 20 MIN: CPT | Performed by: NURSE PRACTITIONER

## 2018-09-06 PROCEDURE — 73140 X-RAY EXAM OF FINGER(S): CPT

## 2018-09-06 ASSESSMENT — ENCOUNTER SYMPTOMS
NAUSEA: 0
EYE DISCHARGE: 0
TROUBLE SWALLOWING: 0
COUGH: 0
SINUS PAIN: 0
CHEST TIGHTNESS: 0
SINUS PRESSURE: 0
SHORTNESS OF BREATH: 0
BACK PAIN: 0
ABDOMINAL PAIN: 0
SORE THROAT: 0
COLOR CHANGE: 0

## 2018-09-06 ASSESSMENT — PATIENT HEALTH QUESTIONNAIRE - PHQ9
2. FEELING DOWN, DEPRESSED OR HOPELESS: 0
SUM OF ALL RESPONSES TO PHQ9 QUESTIONS 1 & 2: 0
1. LITTLE INTEREST OR PLEASURE IN DOING THINGS: 0
SUM OF ALL RESPONSES TO PHQ QUESTIONS 1-9: 0
SUM OF ALL RESPONSES TO PHQ QUESTIONS 1-9: 0

## 2018-09-06 NOTE — PROGRESS NOTES
2018     Sharonda Cui (:  1935) is a 82 y.o. male, here for evaluation of the following medical concerns:      Nayana Pepper in bathroom at the [facility] yesterday evening, 18. Denies feeling dizzy during the fall and at this time. He does not use any mobilization devices. Left thumb injured with small cut. He denies using ice yesterday or today. He had it splinted with a tongue depressor upon arrival.       Hand Pain    The incident occurred 6 to 12 hours ago. The incident occurred at a nursing home. The injury mechanism was a fall. The pain is present in the left hand. The quality of the pain is described as aching. The pain does not radiate. The pain is at a severity of 5/10. The pain is mild. The pain has been intermittent since the incident. Pertinent negatives include no chest pain, muscle weakness, numbness or tingling. The symptoms are aggravated by movement. He has tried immobilization for the symptoms. The treatment provided mild relief. Review of Systems   Constitutional: Negative for appetite change, chills, fatigue and fever. HENT: Negative for congestion, ear pain, postnasal drip, sinus pain, sinus pressure, sore throat and trouble swallowing. Eyes: Negative for discharge. Respiratory: Negative for cough, chest tightness and shortness of breath. Cardiovascular: Negative for chest pain and palpitations. Gastrointestinal: Negative for abdominal pain and nausea. Endocrine: Negative for cold intolerance, heat intolerance and polyuria. Musculoskeletal: Negative for back pain and gait problem. Skin: Negative for color change and rash. Allergic/Immunologic: Negative for environmental allergies, food allergies and immunocompromised state. Neurological: Negative for dizziness, tingling, weakness, numbness and headaches. Hematological: Does not bruise/bleed easily. Psychiatric/Behavioral: Negative for confusion and sleep disturbance.  The patient is not

## 2018-09-07 DIAGNOSIS — S69.92XA INJURY OF LEFT THUMB, INITIAL ENCOUNTER: Primary | ICD-10-CM

## 2018-09-17 ENCOUNTER — OFFICE VISIT (OUTPATIENT)
Dept: ORTHOPEDIC SURGERY | Age: 83
End: 2018-09-17

## 2018-09-17 ENCOUNTER — HOSPITAL ENCOUNTER (OUTPATIENT)
Dept: OCCUPATIONAL THERAPY | Age: 83
Setting detail: THERAPIES SERIES
Discharge: HOME | End: 2018-09-17
Payer: MEDICARE

## 2018-09-17 VITALS — DIASTOLIC BLOOD PRESSURE: 58 MMHG | SYSTOLIC BLOOD PRESSURE: 119 MMHG | HEART RATE: 60 BPM

## 2018-09-17 DIAGNOSIS — S62.639A CLOSED MALLET FRACTURE OF DISTAL PHALANX OF FINGER OF LEFT HAND: ICD-10-CM

## 2018-09-17 DIAGNOSIS — M79.645 FINGER PAIN, LEFT: Primary | ICD-10-CM

## 2018-09-17 DIAGNOSIS — M20.012 CLOSED MALLET FRACTURE OF DISTAL PHALANX OF FINGER OF LEFT HAND: ICD-10-CM

## 2018-09-17 PROCEDURE — 99203 OFFICE O/P NEW LOW 30 MIN: CPT | Performed by: ORTHOPAEDIC SURGERY

## 2018-09-17 PROCEDURE — L3933 FO W/O JOINTS CF: HCPCS | Performed by: OCCUPATIONAL THERAPIST

## 2018-09-17 NOTE — PLAN OF CARE
[place] [address] and [facility], [address]  Phone: [phone]  Fax [phone]    Patient: Elijah Mehta   : 1935  MRN: [id number]  Referring Physician: Referring Practitioner: Savannah Murphy    Evaluation Date: 2018      Medical Diagnosis Information:  Diagnosis: D39.051J, M20.012 (ICD-10-CM) - Closed mallet fracture of distal phalanx of finger of left hand; Treatment Dx: L hand [id number]         Occupational Therapy Splint Certification Form  Dear Referring Practitioner: Gabi Tena following patient has been evaluated for occupational therapy services for fabrication of a custom orthosis. Insurance requires the referring physician to review the treatment plan. Please review the attached evaluation and/or summary of the patient's plan of care, and verify that you agree by signing the attached document and sending it back to our office. Plan of Care/Treatment to date:  [x] Fabrication of custom orthosis-  Single finger splint   [] Instruction on splint use, care and wearing schedule      [] Follow up as needed for splint modifications          Frequency/Duration:  [x] One time visit for splint fabrication and instructions. Follow up as needed for splint modifcations      [] Splint fabricated, patient to return for full evaluation.     Rehab Potential: [x] good [] fair  [] poor     SPLINT EVALUATION    Date: 2018  Name: Elijah Mehta            : 1935      Medical/Treatment Diagnosis Information:  · Diagnosis: N98.714T, M20.012 (ICD-10-CM) - Closed mallet fracture of distal phalanx of finger of left hand;   · Treatment Dx: L hand [id number]  Insurance/Certification information:     Physician Information:  Referring Practitioner: Savannah Murphy       Next MD Appointment: 4 wks    Subjective  History of Injury/ Mechanism of Injury: Pt lost balance and fell  Onset/Surgery Date: 9/10/18  Dominant Hand:    [x] Right []Left  Occupational/Vocational Status: N/A  Progress of any previous OT/PT: the patient []has/ []has not received OT/PT previously for this diagnosis. Pain: Minimal    Objective Findings as appropriate:  ROM, strength, edema, wound/ scar appearance, function:    Type of splint:  Single finger splint   Splint protocol utilization: To wear splint at all times except cleaning. Pt/daughter was given specific verbal and written instructions on how to  Care for thumb during cleansing process. Splint Purpose: [x]Immobilize or protect [x]Promote healing of  Mallet finger injury   [x]Relieve pain  []Provide support for improved hand function []Maximize joint motion    Treatment:   [x]Splint provided ([x]Customized/ []Prefabricated), and splint rationale explained. [x]Patient instructed in [x]wear/ [x]care of splint and educated regarding diagnosis. [x]Patient instructed in symptom reduction techniques   []HEP instruction    []Discussed ADL assistive device    Written Information Distributed: []HEP  [x]Splint care and wearing protocol    Patient response to evaluation and instructions:  [x]Attentive/interested   [x]Asked questions/ retained info  []Appeared disinterested  []Poor retention of information  []Appeared anxious/ fearful    Assessment and Plan: Pt and daughter  Goals: [x]Patient will be able to verbalize rationale for, and demonstrate proper wearing     of splint. [x]Splint will provide proper fit and function. []Patient will be able to verbalize 2-3 ways to prevent further symptoms. [x]Patient will be able to don and doff independently. []Patient will be independent with HEP    Goals met:  [x]yes []no    Plan:  [x]Splint completed with good fit and function. Hand Therapy to follow up for     splint modifications as needed    []Splint completed; OT/PT evaluation initiated. Patient to return for further     treatment.     Meriam Schwab, OT/L [id number]

## 2018-10-22 ENCOUNTER — OFFICE VISIT (OUTPATIENT)
Dept: ORTHOPEDIC SURGERY | Age: 83
End: 2018-10-22
Payer: MEDICARE

## 2018-10-22 VITALS — HEIGHT: 69 IN | WEIGHT: 169.97 LBS | BODY MASS INDEX: 25.18 KG/M2

## 2018-10-22 DIAGNOSIS — S62.639A CLOSED MALLET FRACTURE OF DISTAL PHALANX OF FINGER OF LEFT HAND: ICD-10-CM

## 2018-10-22 DIAGNOSIS — M79.645 FINGER PAIN, LEFT: Primary | ICD-10-CM

## 2018-10-22 DIAGNOSIS — M20.012 CLOSED MALLET FRACTURE OF DISTAL PHALANX OF FINGER OF LEFT HAND: ICD-10-CM

## 2018-10-22 PROCEDURE — 99213 OFFICE O/P EST LOW 20 MIN: CPT | Performed by: ORTHOPAEDIC SURGERY

## 2018-10-23 RX ORDER — FUROSEMIDE 20 MG/1
TABLET ORAL
Qty: 90 TABLET | Refills: 0 | Status: SHIPPED | OUTPATIENT
Start: 2018-10-23 | End: 2019-01-18 | Stop reason: SDUPTHER

## 2018-10-31 DIAGNOSIS — I10 ESSENTIAL HYPERTENSION, BENIGN: ICD-10-CM

## 2018-10-31 RX ORDER — LOSARTAN POTASSIUM 50 MG/1
TABLET ORAL
Qty: 90 TABLET | Refills: 0 | Status: SHIPPED | OUTPATIENT
Start: 2018-10-31 | End: 2019-01-28 | Stop reason: SDUPTHER

## 2018-11-22 DIAGNOSIS — E78.00 PURE HYPERCHOLESTEROLEMIA: ICD-10-CM

## 2018-11-26 DIAGNOSIS — E78.00 PURE HYPERCHOLESTEROLEMIA: ICD-10-CM

## 2018-11-26 RX ORDER — SIMVASTATIN 40 MG
TABLET ORAL
Qty: 90 TABLET | Refills: 0 | Status: SHIPPED | OUTPATIENT
Start: 2018-11-26 | End: 2019-05-19 | Stop reason: SDUPTHER

## 2018-11-26 RX ORDER — SIMVASTATIN 40 MG
TABLET ORAL
Qty: 90 TABLET | Refills: 0 | Status: SHIPPED | OUTPATIENT
Start: 2018-11-26 | End: 2018-11-26 | Stop reason: SDUPTHER

## 2018-12-17 RX ORDER — TERAZOSIN 5 MG/1
CAPSULE ORAL
Qty: 30 CAPSULE | Refills: 0 | Status: SHIPPED | OUTPATIENT
Start: 2018-12-17 | End: 2018-12-19 | Stop reason: SDUPTHER

## 2018-12-19 ENCOUNTER — OFFICE VISIT (OUTPATIENT)
Dept: FAMILY MEDICINE CLINIC | Age: 83
End: 2018-12-19
Payer: MEDICARE

## 2018-12-19 VITALS
BODY MASS INDEX: 27.82 KG/M2 | HEART RATE: 54 BPM | WEIGHT: 167 LBS | DIASTOLIC BLOOD PRESSURE: 60 MMHG | HEIGHT: 65 IN | TEMPERATURE: 97.9 F | SYSTOLIC BLOOD PRESSURE: 118 MMHG | OXYGEN SATURATION: 94 %

## 2018-12-19 DIAGNOSIS — Z00.00 ROUTINE GENERAL MEDICAL EXAMINATION AT A HEALTH CARE FACILITY: Primary | ICD-10-CM

## 2018-12-19 PROCEDURE — G0439 PPPS, SUBSEQ VISIT: HCPCS | Performed by: FAMILY MEDICINE

## 2018-12-19 ASSESSMENT — ANXIETY QUESTIONNAIRES: GAD7 TOTAL SCORE: 0

## 2018-12-19 ASSESSMENT — PATIENT HEALTH QUESTIONNAIRE - PHQ9
SUM OF ALL RESPONSES TO PHQ QUESTIONS 1-9: 0
SUM OF ALL RESPONSES TO PHQ QUESTIONS 1-9: 0

## 2018-12-19 ASSESSMENT — LIFESTYLE VARIABLES: HOW OFTEN DO YOU HAVE A DRINK CONTAINING ALCOHOL: 0

## 2018-12-19 NOTE — PATIENT INSTRUCTIONS
example, this would mean 60 grams of fat or less per day. Saturated fat and trans fat in your diet raises your blood cholesterol the most, much more than dietary cholesterol does. For this reason, on a heart-healthy diet, less than 7% of your calories should come from saturated fat and ideally 0% from trans fat. On an 1800-calorie diet, this translates into less than 14 grams of saturated fat per day, leaving 46 grams of fat to come from mono- and polyunsaturated fats.    Food Choices on a Heart Healthy Diet   Food Category   Foods Recommended   Foods to Avoid   Grains   Breads and rolls without salted tops Most dry and cooked cereals Unsalted crackers and breadsticks Low-sodium or homemade breadcrumbs or stuffing All rice and pastas   Breads, rolls, and crackers with salted tops High-fat baked goods (eg, muffins, donuts, pastries) Quick breads, self-rising flour, and biscuit mixes Regular bread crumbs Instant hot cereals Commercially prepared rice, pasta, or stuffing mixes   Vegetables   Most fresh, frozen, and low-sodium canned vegetables Low-sodium and salt-free vegetable juices Canned vegetables if unsalted or rinsed   Regular canned vegetables and juices, including sauerkraut and pickled vegetables Frozen vegetables with sauces Commercially prepared potato and vegetable mixes   Fruits   Most fresh, frozen, and canned fruits All fruit juices   Fruits processed with salt or sodium   Milk   Nonfat or low-fat (1%) milk Nonfat or low-fat yogurt Cottage cheese, low-fat ricotta, cheeses labeled as low-fat and low-sodium   Whole milk Reduced-fat (2%) milk Malted and chocolate milk Full fat yogurt Most cheeses (unless low-fat and low salt) Buttermilk (no more than 1 cup per week)   Meats and Beans   Lean cuts of fresh or frozen beef, veal, lamb, or pork (look for the word loin) Fresh or frozen poultry without the skin Fresh or frozen fish and some shellfish Egg whites and egg substitutes (Limit whole eggs to three per care is a key part of your treatment and safety. Be sure to make and go to all appointments, and call your doctor if you are having problems. It's also a good idea to know your test results and keep a list of the medicines you take. How can you care for yourself at home? Get enough calcium and vitamin D. The [place] [employer] recommends adults younger than age 46 need 1,000 mg of calcium and 600 IU of vitamin D each day. Women ages 46 to 79 need 1,200 mg of calcium and 600 IU of vitamin D each day. Men ages 46 to 79 need 1,000 mg of calcium and 600 IU of vitamin D each day. Adults 71 and older need 1,200 mg of calcium and 800 IU of vitamin D each day. Eat foods rich in calcium, like yogurt, cheese, milk, and dark green vegetables. Eat foods rich in vitamin D, like eggs, fatty fish, cereal, and fortified milk. Get some sunshine. Your body uses sunshine to make its own vitamin D. The safest time to be out in the sun is before 10 a.m. or after 3 p.m. Avoid getting sunburned. Sunburn can increase your risk of skin cancer. Talk to your doctor about taking a calcium plus vitamin D supplement. Ask about what type of calcium is right for you, and how much to take at a time. Adults ages 23 to 48 should not get more than 2,500 mg of calcium and 4,000 IU of vitamin D each day, whether it is from supplements and/or food. Adults ages 46 and older should not get more than 2,000 mg of calcium and 4,000 IU of vitamin D each day from supplements and/or food. Get regular bone-building exercise. Weight-bearing and resistance exercises keep bones healthy by working the muscles and bones against gravity. Start out at an exercise level that feels right for you. Add a little at a time until you can do the following:  Do 30 minutes of weight-bearing exercise on most days of the week. Walking, jogging, stair climbing, and dancing are good choices. Do resistance exercises with weights or elastic bands 2 to 3 days a week.   Limit your health changes. Know that you can always change your mind. Ask your doctor about commonly used life-support measures. These include tube feedings, breathing machines, and fluids given through a vein (IV). Understanding these treatments will help you decide whether you want them. You may choose to have these life-supporting treatments for a limited time. This allows a trial period to see whether they will help you. You may also decide that you want your doctor to take only certain measures to keep you alive. It is important to spell out these conditions so that your doctor and family understand them. Talk to your doctor about how long you are likely to live. He or she may be able to give you an idea of what usually happens with your specific illness. Think about preparing papers that state your wishes. This way there will not be any confusion about what you want. You can change your instructions at any time. Which papers should you prepare? Advance directives are legal papers that tell doctors how you want to be cared for at the end of your life. You do not need a  to write these papers. Ask your doctor or your state [facility] department for information on how to write your advance directives. They may have the forms for each of these types of papers. Make sure your doctor has a copy of these on file, and give a copy to a family member or close friend. Consider a do-not-resuscitate order (DNR). This order asks that no extra treatments be done if your heart stops or you stop breathing. Extra treatments may include cardiopulmonary resuscitation (CPR), electrical shock to restart your heart, or a machine to breathe for you. If you decide to have a DNR order, ask your doctor to explain and write it. Place the order in your home where everyone can easily see it. Consider a living will.  A living will explains your wishes about life support and other treatments at the end of your life if you become unable to questions as you make your living will:  Do you know enough about life support methods that might be used? If not, talk to your doctor so you know what might be done if you can't breathe on your own, your heart stops, or you're unable to swallow. What things would you still want to be able to do after you receive life-support methods? Would you want to be able to walk? To speak? To eat on your own? To live without the help of machines? If you have a choice, where do you want to be cared for? In your home? At a hospital or nursing home? Do you want certain Jainism practices performed if you become very ill? If you have a choice at the end of your life, where would you prefer to die? At home? In a hospital or nursing home? Somewhere else? Would you prefer to be buried or cremated? Do you want your organs to be donated after you die? What should you do with your living will? Make sure that your family members and your health care agent have copies of your living will. Give your doctor a copy of your living will to keep in your medical record. If you have more than one doctor, make sure that each one has a copy. You may want to put a copy of your living will where it can be easily found. Where can you learn more? Go to https://ConceptoMed.Ecommo. org and sign in to your ShrinkTheWeb account. Enter N010 in the [employer] box to learn more about \"Learning About Living Naomy Poon. \"     If you do not have an account, please click on the \"Sign Up Now\" link. Current as of: April 19, 2018  Content Version: 11.8  © 0160-0911 Healthwise, Incorporated. Care instructions adapted under license by [facility] ([facility]). If you have questions about a medical condition or this instruction, always ask your healthcare professional. [address] any warranty or liability for your use of this information.     ·        Learning About Durable Power of  for Health Care  What is a durable power decisions. When you name a health care agent, it is very clear who has the power to make health decisions for you. How do you name a health care agent? You name your health care agent on a legal form. It is usually called a durable power of  for health care. Ask your hospital, state bar association, or office on aging where to find these forms. You must sign the form to make it legal. Some states require you to get the form notarized. This means that a person called a  watches you sign the form and then he or she signs the form. Some states also require that two or more witnesses sign the form. Be sure to tell your family members and doctors who your health care agent is. Keep your forms in a safe place. But make sure that your loved ones know where the forms are. This could be in your desk where you keep other important papers. Make sure your doctor has a copy of your forms. Where can you learn more? Go to https://chpepiceweb.healthJohnâ€™s [employer]. org and sign in to your [employer] account. Enter [id number] in the AutoAlert box to learn more about \"Learning About Durable Power of  for Health Care. \"     If you do not have an account, please click on the \"Sign Up Now\" link. Current as of: April 19, 2018  Content Version: 11.8  © 1265-4078 Healthwise, Incorporated. Care instructions adapted under license by [facility] ([facility]). If you have questions about a medical condition or this instruction, always ask your healthcare professional. [place] [address] any warranty or liability for your use of this information.     ·

## 2018-12-19 NOTE — PROGRESS NOTES
Medicare Annual Wellness Visit  Name: Tavo Reyez Date: 2018   MRN: [id number] Sex: Male   Age: 82 y.o. Ethnicity: Non-/Non    : 1935 Race: Brooke Moreno is here for Medicare AWV    Screenings for behavioral, psychosocial and functional/safety risks, and cognitive dysfunction are all negative except as indicated below. These results, as well as other patient data from the [address] form, are documented in Flowsheets linked to this Encounter. No Known Allergies    Prior to Visit Medications    Medication Sig Taking?  Authorizing Provider   simvastatin (ZOCOR) 40 MG tablet TAKE 1 TABLET BY MOUTH EVERY DAY Yes TREVOR Mar CNP   losartan (COZAAR) 50 MG tablet TAKE 1 TABLET BY MOUTH EVERY DAY Yes TREVOR Salmeron CNP   furosemide (LASIX) 20 MG tablet TAKE 1 TABLET BY MOUTH DAILY Yes TREVOR Salmeron CNP   donepezil (ARICEPT) 10 MG tablet TAKE 1 TABLET BY MOUTH EVERY NIGHT Yes Jennifer Schmitt MD   carvedilol (COREG) 3.125 MG tablet TAKE 1 TABLET BY MOUTH TWICE DAILY WITH MEALS Yes Jennifer Schmitt MD   citalopram (CELEXA) 10 MG tablet TAKE 1 TABLET BY MOUTH DAILY Yes Jennifer Schmitt MD   ranitidine (ZANTAC) 150 MG tablet TAKE 1 TABLET BY MOUTH TWICE DAILY Yes Jennifer Schmitt MD   levothyroxine (SYNTHROID) 200 MCG tablet Take 1 tablet by mouth daily Yes TREVOR Perez CNP   terazosin (HYTRIN) 5 MG capsule TAKE 1 CAPSULE BY MOUTH EVERY NIGHT Yes TREVOR Perez CNP   fluticasone (FLONASE) 50 MCG/ACT nasal spray SHAKE LIQUID AND USE 1 SPRAY IN EACH NOSTRIL DAILY Yes TREVOR Perez CNP   meloxicam (MOBIC) 15 MG tablet TAKE 1 TABLET BY MOUTH DAILY  Patient taking differently: Take 15 mg by mouth as needed  Yes TREVOR Perez CNP   senna-docusate (PERICOLACE) 8.6-50 MG per tablet TAKE 2 TABLETS BY MOUTH DAILY AS NEEDED FOR CONSTIPATION Yes Jennifer Schmitt MD   amLODIPine (NORVASC) 10 MG tablet TAKE 1 TABLET BY MOUTH EVERY DAY Yes Luis Garcia MD   loratadine (CLARITIN) 10 MG tablet Take 1 tablet by mouth daily Yes TREVOR Millard CNP   aspirin EC 81 MG EC tablet Take 1 tablet by mouth daily Yes Lobo Chase MD       Past Medical History:   Diagnosis Date    CAD (coronary artery disease) 8/4/2011    Cognitive impairment 8/4/2011    Essential hypertension, benign 8/4/2011    Hypothyroid 8/4/2011    PAD (peripheral artery disease) 8/4/2011    Pure hypercholesterolemia 8/4/2011     Past Surgical History:   Procedure Laterality Date    CORONARY ARTERY BYPASS GRAFT      KNEE SURGERY Left     knee replacement       Family History   Problem Relation Age of Onset    Hypertension Mother     Hypertension Father        CareTeam (Including outside providers/suppliers regularly involved in providing care):   Patient Care Team:  Luis Garcia MD as PCP - [address] [place] [address], APRN - CNP as PCP - [facility] Attributed Provider  Guzman Camarena MD as Consulting Physician (Orthopedic Surgery)  Benita Oliva MD as Consulting Physician (Sports Medicine)  Godwin Hinkle MD as Consulting Physician (Cardiology)    Wt Readings from Last 3 Encounters:   12/19/18 167 lb (75.8 kg)   10/22/18 169 lb 15.6 oz (77.1 kg)   09/06/18 170 lb (77.1 kg)     Vitals:    12/19/18 0953   BP: 118/60   Pulse: 54   Temp: 97.9 °F (36.6 °C)   TempSrc: Oral   SpO2: 94%   Weight: 167 lb (75.8 kg)   Height: 5' 5\" (1.651 m)     Body mass index is 27.79 kg/m². Based upon direct observation of the patient, evaluation of cognition reveals recent and remote memory intact. Patient's complete Health Risk Assessment and screening values have been reviewed and are found in Flowsheets. The following problems were reviewed today and where indicated follow up appointments were made and/or referrals ordered. Positive Risk Factor Screenings with Interventions:     Cognitive:   Words recalled: 0 Words Recalled  Clock Drawing Test (CDT) Score: provided    Hearing/Vision:  Hearing/Vision  Do you or your family notice any trouble with your hearing?: No  Do you have difficulty driving, watching TV, or doing any of your daily activities because of your eyesight?: No  Have you had an eye exam within the past year?: (!) No  Hearing/Vision Interventions:  · Vision concerns:  patient encouraged to make appointment with his/her eye specialist    Safety:  Safety  Do you have working smoke detectors?: (!) No  Have all throw rugs been removed or fastened?: Yes  Do you have non-slip mats in all bathtubs?: Yes  Do all of your stairways have a railing or banister?: Yes  Are your doorways, halls and stairs free of clutter?: Yes  Do you always fasten your seatbelt when you are in a car?: Yes  Safety Interventions:  · Home safety tips provided    ADL:  ADLs  In the past 7 days, did you need help from others to perform any of the following everyday activities?: None  In the past 7 days, did you need help from others to take care of any of the following?: (!) Food Preparation, Taking Medications  ADL Interventions:  · Patient declines any further evaluation/treatment for this issue Lives at [facility], has help with the above items.      Personalized Preventive Plan   Current Health Maintenance Status  Immunization History   Administered Date(s) Administered    Influenza Virus Vaccine 11/02/2012, 10/29/2013, 09/21/2015    Influenza, High Dose (Fluzone 65 yrs and older) 10/25/2011, 09/28/2017, 11/13/2017, 11/16/2018    Pneumococcal 13-valent Conjugate ([id number]) 06/29/2016    Pneumococcal Conjugate 7-valent 10/27/2006        Health Maintenance   Topic Date Due    DTaP/Tdap/Td vaccine (1 - Tdap) 02/12/1956    Shingles Vaccine (1 of 2 - 2 Dose Series) 02/12/1987    Pneumococcal low/med risk (2 of 2 - PPSV23) 06/29/2017    Potassium monitoring  02/19/2019    Creatinine monitoring  02/19/2019    TSH testing  06/29/2019    Flu vaccine  Completed       Denies Pneumococcal vaccine. Recommendations for Preventive Services Due: see orders and patient instructions/AVS.  . Recommended screening schedule for the next 5-10 years is provided to the patient in written form: see Patient Instructions/AVS.    Vincenzo KENT LPN, 24/89/1436, performed the documented evaluation under the direct supervision of the attending physician. This encounter was performed under Diana yepez MDs, direct supervision, 12/19/2018.

## 2019-01-04 ENCOUNTER — OFFICE VISIT (OUTPATIENT)
Dept: FAMILY MEDICINE CLINIC | Age: 84
End: 2019-01-04
Payer: MEDICARE

## 2019-01-04 VITALS
OXYGEN SATURATION: 96 % | DIASTOLIC BLOOD PRESSURE: 54 MMHG | SYSTOLIC BLOOD PRESSURE: 98 MMHG | BODY MASS INDEX: 27.62 KG/M2 | WEIGHT: 166 LBS | TEMPERATURE: 97.5 F | HEART RATE: 62 BPM

## 2019-01-04 DIAGNOSIS — E03.9 HYPOTHYROIDISM, UNSPECIFIED TYPE: ICD-10-CM

## 2019-01-04 DIAGNOSIS — R41.89 COGNITIVE IMPAIRMENT: ICD-10-CM

## 2019-01-04 DIAGNOSIS — I35.0 AORTIC VALVE STENOSIS, ETIOLOGY OF CARDIAC VALVE DISEASE UNSPECIFIED: ICD-10-CM

## 2019-01-04 DIAGNOSIS — E78.00 PURE HYPERCHOLESTEROLEMIA: ICD-10-CM

## 2019-01-04 DIAGNOSIS — I10 ESSENTIAL HYPERTENSION, BENIGN: Primary | ICD-10-CM

## 2019-01-04 LAB
A/G RATIO: 1.8 (ref 1.1–2.2)
ALBUMIN SERPL-MCNC: 4.2 G/DL (ref 3.4–5)
ALP BLD-CCNC: 77 U/L (ref 40–129)
ALT SERPL-CCNC: 9 U/L (ref 10–40)
ANION GAP SERPL CALCULATED.3IONS-SCNC: 14 MMOL/L (ref 3–16)
AST SERPL-CCNC: 10 U/L (ref 15–37)
BILIRUB SERPL-MCNC: 0.3 MG/DL (ref 0–1)
BUN BLDV-MCNC: 26 MG/DL (ref 7–20)
CALCIUM SERPL-MCNC: 8.6 MG/DL (ref 8.3–10.6)
CHLORIDE BLD-SCNC: 103 MMOL/L (ref 99–110)
CHOLESTEROL, TOTAL: 138 MG/DL (ref 0–199)
CO2: 25 MMOL/L (ref 21–32)
CREAT SERPL-MCNC: 2.2 MG/DL (ref 0.8–1.3)
GFR AFRICAN AMERICAN: 35
GFR NON-AFRICAN AMERICAN: 29
GLOBULIN: 2.3 G/DL
GLUCOSE BLD-MCNC: 89 MG/DL (ref 70–99)
HDLC SERPL-MCNC: 35 MG/DL (ref 40–60)
LDL CHOLESTEROL CALCULATED: 74 MG/DL
POTASSIUM SERPL-SCNC: 4.6 MMOL/L (ref 3.5–5.1)
SODIUM BLD-SCNC: 142 MMOL/L (ref 136–145)
T4 FREE: 1.9 NG/DL (ref 0.9–1.8)
TOTAL PROTEIN: 6.5 G/DL (ref 6.4–8.2)
TRIGL SERPL-MCNC: 143 MG/DL (ref 0–150)
TSH SERPL DL<=0.05 MIU/L-ACNC: <0.01 UIU/ML (ref 0.27–4.2)
VLDLC SERPL CALC-MCNC: 29 MG/DL

## 2019-01-04 PROCEDURE — 36415 COLL VENOUS BLD VENIPUNCTURE: CPT | Performed by: FAMILY MEDICINE

## 2019-01-04 PROCEDURE — 99214 OFFICE O/P EST MOD 30 MIN: CPT | Performed by: FAMILY MEDICINE

## 2019-01-07 DIAGNOSIS — E03.9 HYPOTHYROIDISM, UNSPECIFIED TYPE: ICD-10-CM

## 2019-01-07 RX ORDER — LEVOTHYROXINE SODIUM 175 UG/1
175 TABLET ORAL DAILY
Qty: 90 TABLET | Refills: 3 | Status: ON HOLD | OUTPATIENT
Start: 2019-01-07 | End: 2019-12-24

## 2019-01-14 RX ORDER — TERAZOSIN 5 MG/1
CAPSULE ORAL
Qty: 30 CAPSULE | Refills: 2 | Status: SHIPPED | OUTPATIENT
Start: 2019-01-14 | End: 2019-04-10 | Stop reason: SDUPTHER

## 2019-01-16 ENCOUNTER — HOSPITAL ENCOUNTER (OUTPATIENT)
Dept: NON INVASIVE DIAGNOSTICS | Age: 84
Discharge: HOME | End: 2019-01-16
Payer: MEDICARE

## 2019-01-16 LAB
LV EF: 43 %
LVEF MODALITY: NORMAL

## 2019-01-16 PROCEDURE — 93306 TTE W/DOPPLER COMPLETE: CPT

## 2019-01-18 RX ORDER — FUROSEMIDE 20 MG/1
TABLET ORAL
Qty: 90 TABLET | Refills: 0 | Status: SHIPPED | OUTPATIENT
Start: 2019-01-18 | End: 2019-04-15 | Stop reason: SDUPTHER

## 2019-01-28 DIAGNOSIS — I10 ESSENTIAL HYPERTENSION, BENIGN: ICD-10-CM

## 2019-01-28 RX ORDER — LOSARTAN POTASSIUM 50 MG/1
TABLET ORAL
Qty: 90 TABLET | Refills: 0 | Status: SHIPPED | OUTPATIENT
Start: 2019-01-28 | End: 2019-04-25 | Stop reason: SDUPTHER

## 2019-02-06 RX ORDER — CARVEDILOL 3.12 MG/1
TABLET ORAL
Qty: 180 TABLET | Refills: 0 | Status: SHIPPED | OUTPATIENT
Start: 2019-02-06 | End: 2019-05-06 | Stop reason: SDUPTHER

## 2019-04-10 RX ORDER — TERAZOSIN 5 MG/1
CAPSULE ORAL
Qty: 30 CAPSULE | Refills: 3 | Status: SHIPPED | OUTPATIENT
Start: 2019-04-10 | End: 2019-08-06 | Stop reason: SDUPTHER

## 2019-04-15 RX ORDER — FUROSEMIDE 20 MG/1
TABLET ORAL
Qty: 90 TABLET | Refills: 0 | Status: SHIPPED | OUTPATIENT
Start: 2019-04-15 | End: 2019-07-11 | Stop reason: SDUPTHER

## 2019-05-06 RX ORDER — CARVEDILOL 3.12 MG/1
TABLET ORAL
Qty: 180 TABLET | Refills: 5 | Status: ON HOLD | OUTPATIENT
Start: 2019-05-06 | End: 2020-03-31 | Stop reason: HOSPADM

## 2019-05-08 ENCOUNTER — OFFICE VISIT (OUTPATIENT)
Dept: FAMILY MEDICINE CLINIC | Age: 84
End: 2019-05-08
Payer: MEDICARE

## 2019-05-08 VITALS
HEART RATE: 64 BPM | OXYGEN SATURATION: 98 % | WEIGHT: 163.8 LBS | RESPIRATION RATE: 14 BRPM | BODY MASS INDEX: 24.83 KG/M2 | HEIGHT: 68 IN | SYSTOLIC BLOOD PRESSURE: 138 MMHG | DIASTOLIC BLOOD PRESSURE: 80 MMHG

## 2019-05-08 DIAGNOSIS — D49.2 NEOPLASM OF UNSPECIFIED BEHAVIOR OF BONE, SOFT TISSUE, AND SKIN: ICD-10-CM

## 2019-05-08 DIAGNOSIS — L98.9 SKIN ABNORMALITY: Primary | ICD-10-CM

## 2019-05-08 PROCEDURE — 11102 TANGNTL BX SKIN SINGLE LES: CPT | Performed by: FAMILY MEDICINE

## 2019-05-08 PROCEDURE — 99213 OFFICE O/P EST LOW 20 MIN: CPT | Performed by: FAMILY MEDICINE

## 2019-05-09 ENCOUNTER — TELEPHONE (OUTPATIENT)
Dept: FAMILY MEDICINE CLINIC | Age: 84
End: 2019-05-09

## 2019-05-09 NOTE — TELEPHONE ENCOUNTER
Fabian Conte is requesting to speak to the nurse regarding the biopsy that they received. She needs to know which part of the body it was taken from.

## 2019-05-19 NOTE — PROGRESS NOTES
2019     Jeremiah Austin (:  1935) is a 82 y.o. male, here for evaluation of the following medical concerns:    Jeremiah Austin is a 82 y.o. male. Patient presents with: Other: left ear growth , also wants help to stop smoking      Patient has a new growth on left ear. This has been worsening and getting larger. He notes that it has been slightly painful. He has picked at it a few times. Patient has been otherwise stable. The patients PMH, surgical history, family history, medications, allergies were all reviewed and updated as appropriate today. Other           Review of Systems    Prior to Visit Medications    Medication Sig Taking?  Authorizing Provider   carvedilol (COREG) 3.125 MG tablet TAKE 1 TABLET BY MOUTH TWICE DAILY WITH MEALS Yes Shalom Ohara MD   losartan (COZAAR) 50 MG tablet TAKE 1 TABLET BY MOUTH EVERY DAY Yes Shalom Ohara MD   furosemide (LASIX) 20 MG tablet TAKE 1 TABLET BY MOUTH DAILY Yes Shalom Ohara MD   terazosin (HYTRIN) 5 MG capsule TAKE 1 CAPSULE BY MOUTH EVERY NIGHT Yes TREVOR Mcfarlane CNP   levothyroxine (SYNTHROID) 175 MCG tablet Take 1 tablet by mouth daily Yes Shalom Ohara MD   simvastatin (ZOCOR) 40 MG tablet TAKE 1 TABLET BY MOUTH EVERY DAY Yes TREVOR Ellis CNP   donepezil (ARICEPT) 10 MG tablet TAKE 1 TABLET BY MOUTH EVERY NIGHT Yes Shalom Ohara MD   citalopram (CELEXA) 10 MG tablet TAKE 1 TABLET BY MOUTH DAILY Yes Shalom Ohara MD   ranitidine (ZANTAC) 150 MG tablet TAKE 1 TABLET BY MOUTH TWICE DAILY Yes Shalom Ohara MD   fluticasone (FLONASE) 50 MCG/ACT nasal spray SHAKE LIQUID AND USE 1 SPRAY IN EACH NOSTRIL DAILY Yes TREVOR Johnson CNP   meloxicam (MOBIC) 15 MG tablet TAKE 1 TABLET BY MOUTH DAILY  Patient taking differently: Take 15 mg by mouth as needed  Yes TREVOR Johnson CNP   senna-docusate (PERICOLACE) 8.6-50 MG per tablet TAKE 2 TABLETS BY MOUTH DAILY AS NEEDED FOR CONSTIPATION Yes Shalom Ohara MD amLODIPine (NORVASC) 10 MG tablet TAKE 1 TABLET BY MOUTH EVERY DAY Yes Johnathan Soulier, MD   loratadine (CLARITIN) 10 MG tablet Take 1 tablet by mouth daily Yes TREVOR Delgado - CNP   aspirin EC 81 MG EC tablet Take 1 tablet by mouth daily Yes Mary Maldonado MD        Social History     Tobacco Use    Smoking status: Current Some Day Smoker     Packs/day: 1.50     Years: 63.00     Pack years: 94.50     Types: Cigarettes     Last attempt to quit: 2016     Years since quittin.8    Smokeless tobacco: Never Used    Tobacco comment: 1 pack per week   Substance Use Topics    Alcohol use: No        Vitals:    19 1408   BP: 138/80   Site: Left Upper Arm   Position: Sitting   Cuff Size: Medium Adult   Pulse: 64   Resp: 14   SpO2: 98%   Weight: 163 lb 12.8 oz (74.3 kg)   Height: 5' 8\" (1.727 m)     Estimated body mass index is 24.91 kg/m² as calculated from the following:    Height as of this encounter: 5' 8\" (1.727 m). Weight as of this encounter: 163 lb 12.8 oz (74.3 kg). Physical Exam   Constitutional: He is oriented to person, place, and time. He appears well-developed and well-nourished. HENT:   Head: Normocephalic and atraumatic. Right Ear: External ear normal.   Left Ear: External ear normal.   Nose: Nose normal.   Mouth/Throat: Oropharynx is clear and moist.   Eyes: Pupils are equal, round, and reactive to light. Conjunctivae and EOM are normal.   Neck: Normal range of motion. Neck supple. Musculoskeletal: Normal range of motion. He exhibits no edema or tenderness. Neurological: He is alert and oriented to person, place, and time. He has normal reflexes. Skin: Skin is warm and dry. Nodular growth on left ear apex. Psychiatric: He has a normal mood and affect. His behavior is normal. Judgment and thought content normal.   Nursing note and vitals reviewed.   Shave Biopsy Procedure Note    Pre-operative Diagnosis: Suspicious lesion    Post-operative Diagnosis: same    Locations:left ear    Indications: growing    Anesthesia: Lidocaine 1% without epinephrine     Procedure Details   History of allergy to iodine: no    Patient informed of the risks (including bleeding and infection) and benefits of the   procedure and Verbal informed consent obtained. The lesion and surrounding area were given a sterile prep using betadyne and draped in the usual sterile fashion. A scalpel was used to shave an area of skin approximately 1cm by 1cm. Hemostasis achieved with silver nitrate. Antibiotic ointment and a sterile dressing applied. The specimen was sent for pathologic examination. The patient tolerated the procedure well. EBL: 1 ml    Findings:  specimen    Condition:  Stable    Complications:  none. Plan:  1. Instructed to keep the wound dry and covered for 24-48h and clean thereafter. 2. Warning signs of infection were reviewed. 3. Recommended that the patient use OTC acetaminophen as needed for pain. 4. Return in 4 weeks. ASSESSMENT/PLAN:  1. Skin abnormality    - Surgical Pathology      No follow-ups on file. An electronic signature was used to authenticate this note.     --Yaneth Lamar MD on 5/19/2019 at 7:48 PM

## 2019-08-05 DIAGNOSIS — I10 ESSENTIAL HYPERTENSION, BENIGN: ICD-10-CM

## 2019-08-05 DIAGNOSIS — F32.A DEPRESSED: ICD-10-CM

## 2019-08-05 RX ORDER — CITALOPRAM 10 MG/1
10 TABLET ORAL DAILY
Qty: 90 TABLET | Refills: 0 | Status: SHIPPED | OUTPATIENT
Start: 2019-08-05 | End: 2019-10-31 | Stop reason: SDUPTHER

## 2019-08-05 RX ORDER — AMLODIPINE BESYLATE 10 MG/1
TABLET ORAL
Qty: 90 TABLET | Refills: 0 | Status: SHIPPED | OUTPATIENT
Start: 2019-08-05 | End: 2019-10-31 | Stop reason: SDUPTHER

## 2019-08-05 RX ORDER — RANITIDINE 150 MG/1
TABLET ORAL
Qty: 180 TABLET | Refills: 0 | Status: SHIPPED | OUTPATIENT
Start: 2019-08-05 | End: 2019-10-31 | Stop reason: SDUPTHER

## 2019-08-06 RX ORDER — TERAZOSIN 5 MG/1
CAPSULE ORAL
Qty: 30 CAPSULE | Refills: 0 | Status: SHIPPED | OUTPATIENT
Start: 2019-08-06 | End: 2019-09-03 | Stop reason: SDUPTHER

## 2019-09-03 RX ORDER — TERAZOSIN 5 MG/1
CAPSULE ORAL
Qty: 30 CAPSULE | Refills: 0 | Status: SHIPPED | OUTPATIENT
Start: 2019-09-03 | End: 2019-10-01 | Stop reason: SDUPTHER

## 2019-10-14 ENCOUNTER — OFFICE VISIT (OUTPATIENT)
Dept: DERMATOLOGY | Age: 84
End: 2019-10-14
Payer: MEDICARE

## 2019-10-14 DIAGNOSIS — L57.0 ACTINIC KERATOSES: Primary | ICD-10-CM

## 2019-10-14 DIAGNOSIS — L82.1 SEBORRHEIC KERATOSES: ICD-10-CM

## 2019-10-14 DIAGNOSIS — L81.4 LENTIGINES: ICD-10-CM

## 2019-10-14 DIAGNOSIS — F17.200 CURRENT SMOKER ON SOME DAYS: ICD-10-CM

## 2019-10-14 PROCEDURE — 17003 DESTRUCT PREMALG LES 2-14: CPT | Performed by: DERMATOLOGY

## 2019-10-14 PROCEDURE — 17000 DESTRUCT PREMALG LESION: CPT | Performed by: DERMATOLOGY

## 2019-10-14 PROCEDURE — 99202 OFFICE O/P NEW SF 15 MIN: CPT | Performed by: DERMATOLOGY

## 2019-11-27 ENCOUNTER — APPOINTMENT (OUTPATIENT)
Dept: GENERAL RADIOLOGY | Age: 84
DRG: 291 | End: 2019-11-27
Payer: MEDICARE

## 2019-11-27 ENCOUNTER — HOSPITAL ENCOUNTER (INPATIENT)
Age: 84
LOS: 4 days | Discharge: HOME | DRG: 291 | End: 2019-12-01
Attending: EMERGENCY MEDICINE | Admitting: INTERNAL MEDICINE
Payer: MEDICARE

## 2019-11-27 DIAGNOSIS — N18.30 STAGE 3 CHRONIC KIDNEY DISEASE (HCC): ICD-10-CM

## 2019-11-27 DIAGNOSIS — R09.02 HYPOXIA: ICD-10-CM

## 2019-11-27 DIAGNOSIS — I35.0 NONRHEUMATIC AORTIC VALVE STENOSIS: ICD-10-CM

## 2019-11-27 DIAGNOSIS — J44.1 COPD EXACERBATION (HCC): ICD-10-CM

## 2019-11-27 DIAGNOSIS — G89.29 CHRONIC BILATERAL LOW BACK PAIN WITH RIGHT-SIDED SCIATICA: ICD-10-CM

## 2019-11-27 DIAGNOSIS — I50.23 ACUTE ON CHRONIC SYSTOLIC CONGESTIVE HEART FAILURE (HCC): Primary | ICD-10-CM

## 2019-11-27 DIAGNOSIS — M54.41 CHRONIC BILATERAL LOW BACK PAIN WITH RIGHT-SIDED SCIATICA: ICD-10-CM

## 2019-11-27 LAB
ANION GAP SERPL CALCULATED.3IONS-SCNC: 17 MMOL/L (ref 3–16)
BASE EXCESS VENOUS: -7.3 MMOL/L (ref -3–3)
BASOPHILS ABSOLUTE: 0.1 K/UL (ref 0–0.2)
BASOPHILS RELATIVE PERCENT: 0.7 %
BUN BLDV-MCNC: 47 MG/DL (ref 7–20)
CALCIUM SERPL-MCNC: 8.2 MG/DL (ref 8.3–10.6)
CARBOXYHEMOGLOBIN: 3.2 % (ref 0–1.5)
CHLORIDE BLD-SCNC: 103 MMOL/L (ref 99–110)
CO2: 18 MMOL/L (ref 21–32)
CREAT SERPL-MCNC: 3.2 MG/DL (ref 0.8–1.3)
EOSINOPHILS ABSOLUTE: 0 K/UL (ref 0–0.6)
EOSINOPHILS RELATIVE PERCENT: 0.1 %
GFR AFRICAN AMERICAN: 23
GFR NON-AFRICAN AMERICAN: 19
GLUCOSE BLD-MCNC: 117 MG/DL (ref 70–99)
HCO3 VENOUS: 19.1 MMOL/L (ref 23–29)
HCT VFR BLD CALC: 29.3 % (ref 40.5–52.5)
HEMOGLOBIN: 9.6 G/DL (ref 13.5–17.5)
INR BLD: 1.08 (ref 0.86–1.14)
LACTIC ACID, SEPSIS: 1.4 MMOL/L (ref 0.4–1.9)
LYMPHOCYTES ABSOLUTE: 0.2 K/UL (ref 1–5.1)
LYMPHOCYTES RELATIVE PERCENT: 2.7 %
MCH RBC QN AUTO: 31.8 PG (ref 26–34)
MCHC RBC AUTO-ENTMCNC: 32.9 G/DL (ref 31–36)
MCV RBC AUTO: 96.9 FL (ref 80–100)
METHEMOGLOBIN VENOUS: 0.3 %
MONOCYTES ABSOLUTE: 0.1 K/UL (ref 0–1.3)
MONOCYTES RELATIVE PERCENT: 1 %
NEUTROPHILS ABSOLUTE: 8.8 K/UL (ref 1.7–7.7)
NEUTROPHILS RELATIVE PERCENT: 95.5 %
O2 CONTENT, VEN: 12 VOL %
O2 SAT, VEN: 75 %
O2 THERAPY: ABNORMAL
PCO2, VEN: 41.6 MMHG (ref 40–50)
PDW BLD-RTO: 13.5 % (ref 12.4–15.4)
PH VENOUS: 7.28 (ref 7.35–7.45)
PLATELET # BLD: 129 K/UL (ref 135–450)
PMV BLD AUTO: 8.3 FL (ref 5–10.5)
PO2, VEN: 39.4 MMHG (ref 25–40)
POTASSIUM REFLEX MAGNESIUM: 5 MMOL/L (ref 3.5–5.1)
PRO-BNP: ABNORMAL PG/ML (ref 0–449)
PROTHROMBIN TIME: 12.5 SEC (ref 10–13.2)
RBC # BLD: 3.02 M/UL (ref 4.2–5.9)
SODIUM BLD-SCNC: 138 MMOL/L (ref 136–145)
TCO2 CALC VENOUS: 20 MMOL/L
TROPONIN: 0.06 NG/ML
TROPONIN: 0.11 NG/ML
WBC # BLD: 9.2 K/UL (ref 4–11)

## 2019-11-27 PROCEDURE — 96374 THER/PROPH/DIAG INJ IV PUSH: CPT

## 2019-11-27 PROCEDURE — 93005 ELECTROCARDIOGRAM TRACING: CPT | Performed by: EMERGENCY MEDICINE

## 2019-11-27 PROCEDURE — 85610 PROTHROMBIN TIME: CPT

## 2019-11-27 PROCEDURE — 1200000000 HC SEMI PRIVATE

## 2019-11-27 PROCEDURE — 94640 AIRWAY INHALATION TREATMENT: CPT

## 2019-11-27 PROCEDURE — 99285 EMERGENCY DEPT VISIT HI MDM: CPT

## 2019-11-27 PROCEDURE — 82803 BLOOD GASES ANY COMBINATION: CPT

## 2019-11-27 PROCEDURE — 80048 BASIC METABOLIC PNL TOTAL CA: CPT

## 2019-11-27 PROCEDURE — 2700000000 HC OXYGEN THERAPY PER DAY

## 2019-11-27 PROCEDURE — 84484 ASSAY OF TROPONIN QUANT: CPT

## 2019-11-27 PROCEDURE — 6370000000 HC RX 637 (ALT 250 FOR IP): Performed by: INTERNAL MEDICINE

## 2019-11-27 PROCEDURE — 85025 COMPLETE CBC W/AUTO DIFF WBC: CPT

## 2019-11-27 PROCEDURE — 36415 COLL VENOUS BLD VENIPUNCTURE: CPT

## 2019-11-27 PROCEDURE — 2580000003 HC RX 258: Performed by: INTERNAL MEDICINE

## 2019-11-27 PROCEDURE — 6370000000 HC RX 637 (ALT 250 FOR IP): Performed by: EMERGENCY MEDICINE

## 2019-11-27 PROCEDURE — 6360000002 HC RX W HCPCS: Performed by: INTERNAL MEDICINE

## 2019-11-27 PROCEDURE — 83880 ASSAY OF NATRIURETIC PEPTIDE: CPT

## 2019-11-27 PROCEDURE — 83605 ASSAY OF LACTIC ACID: CPT

## 2019-11-27 PROCEDURE — 94761 N-INVAS EAR/PLS OXIMETRY MLT: CPT

## 2019-11-27 PROCEDURE — 6360000002 HC RX W HCPCS: Performed by: EMERGENCY MEDICINE

## 2019-11-27 PROCEDURE — 96375 TX/PRO/DX INJ NEW DRUG ADDON: CPT

## 2019-11-27 PROCEDURE — 71045 X-RAY EXAM CHEST 1 VIEW: CPT

## 2019-11-27 RX ORDER — SIMVASTATIN 40 MG
40 TABLET ORAL NIGHTLY
Status: DISCONTINUED | OUTPATIENT
Start: 2019-11-27 | End: 2019-12-01 | Stop reason: HOSPADM

## 2019-11-27 RX ORDER — ONDANSETRON 2 MG/ML
4 INJECTION INTRAMUSCULAR; INTRAVENOUS EVERY 6 HOURS PRN
Status: DISCONTINUED | OUTPATIENT
Start: 2019-11-27 | End: 2019-12-01 | Stop reason: HOSPADM

## 2019-11-27 RX ORDER — LORATADINE 10 MG/1
10 TABLET ORAL DAILY
Status: DISCONTINUED | OUTPATIENT
Start: 2019-11-28 | End: 2019-11-27 | Stop reason: CLARIF

## 2019-11-27 RX ORDER — CETIRIZINE HYDROCHLORIDE 5 MG/1
5 TABLET ORAL DAILY
Status: DISCONTINUED | OUTPATIENT
Start: 2019-11-28 | End: 2019-12-01 | Stop reason: HOSPADM

## 2019-11-27 RX ORDER — CITALOPRAM 20 MG/1
10 TABLET ORAL DAILY
Status: DISCONTINUED | OUTPATIENT
Start: 2019-11-28 | End: 2019-12-01 | Stop reason: HOSPADM

## 2019-11-27 RX ORDER — AMLODIPINE BESYLATE 5 MG/1
10 TABLET ORAL DAILY
Status: DISCONTINUED | OUTPATIENT
Start: 2019-11-28 | End: 2019-11-28

## 2019-11-27 RX ORDER — SODIUM CHLORIDE 0.9 % (FLUSH) 0.9 %
10 SYRINGE (ML) INJECTION PRN
Status: DISCONTINUED | OUTPATIENT
Start: 2019-11-27 | End: 2019-12-01 | Stop reason: HOSPADM

## 2019-11-27 RX ORDER — IPRATROPIUM BROMIDE AND ALBUTEROL SULFATE 2.5; .5 MG/3ML; MG/3ML
1 SOLUTION RESPIRATORY (INHALATION)
Status: DISCONTINUED | OUTPATIENT
Start: 2019-11-27 | End: 2019-11-27

## 2019-11-27 RX ORDER — RANITIDINE 150 MG/1
150 TABLET ORAL 2 TIMES DAILY
Status: DISCONTINUED | OUTPATIENT
Start: 2019-11-27 | End: 2019-11-27 | Stop reason: CLARIF

## 2019-11-27 RX ORDER — ASPIRIN 81 MG/1
81 TABLET ORAL DAILY
Status: DISCONTINUED | OUTPATIENT
Start: 2019-11-28 | End: 2019-12-01 | Stop reason: HOSPADM

## 2019-11-27 RX ORDER — FUROSEMIDE 10 MG/ML
80 INJECTION INTRAMUSCULAR; INTRAVENOUS ONCE
Status: COMPLETED | OUTPATIENT
Start: 2019-11-27 | End: 2019-11-27

## 2019-11-27 RX ORDER — SENNA AND DOCUSATE SODIUM 50; 8.6 MG/1; MG/1
2 TABLET, FILM COATED ORAL DAILY PRN
Status: DISCONTINUED | OUTPATIENT
Start: 2019-11-27 | End: 2019-12-01 | Stop reason: HOSPADM

## 2019-11-27 RX ORDER — SODIUM CHLORIDE 0.9 % (FLUSH) 0.9 %
10 SYRINGE (ML) INJECTION EVERY 12 HOURS SCHEDULED
Status: DISCONTINUED | OUTPATIENT
Start: 2019-11-27 | End: 2019-12-01 | Stop reason: HOSPADM

## 2019-11-27 RX ORDER — CARVEDILOL 3.12 MG/1
3.12 TABLET ORAL 2 TIMES DAILY WITH MEALS
Status: DISCONTINUED | OUTPATIENT
Start: 2019-11-28 | End: 2019-12-01 | Stop reason: HOSPADM

## 2019-11-27 RX ORDER — IPRATROPIUM BROMIDE AND ALBUTEROL SULFATE 2.5; .5 MG/3ML; MG/3ML
1 SOLUTION RESPIRATORY (INHALATION) ONCE
Status: COMPLETED | OUTPATIENT
Start: 2019-11-27 | End: 2019-11-27

## 2019-11-27 RX ORDER — METHYLPREDNISOLONE SODIUM SUCCINATE 40 MG/ML
40 INJECTION, POWDER, LYOPHILIZED, FOR SOLUTION INTRAMUSCULAR; INTRAVENOUS ONCE
Status: COMPLETED | OUTPATIENT
Start: 2019-11-27 | End: 2019-11-27

## 2019-11-27 RX ORDER — FAMOTIDINE 20 MG/1
20 TABLET, FILM COATED ORAL DAILY
Status: DISCONTINUED | OUTPATIENT
Start: 2019-11-28 | End: 2019-12-01 | Stop reason: HOSPADM

## 2019-11-27 RX ORDER — HEPARIN SODIUM 5000 [USP'U]/ML
5000 INJECTION, SOLUTION INTRAVENOUS; SUBCUTANEOUS EVERY 8 HOURS SCHEDULED
Status: DISCONTINUED | OUTPATIENT
Start: 2019-11-27 | End: 2019-12-01 | Stop reason: HOSPADM

## 2019-11-27 RX ORDER — TERAZOSIN 1 MG/1
5 CAPSULE ORAL NIGHTLY
Status: DISCONTINUED | OUTPATIENT
Start: 2019-11-27 | End: 2019-12-01 | Stop reason: HOSPADM

## 2019-11-27 RX ORDER — DONEPEZIL HYDROCHLORIDE 5 MG/1
10 TABLET, FILM COATED ORAL NIGHTLY
Status: DISCONTINUED | OUTPATIENT
Start: 2019-11-27 | End: 2019-12-01 | Stop reason: HOSPADM

## 2019-11-27 RX ADMIN — TERAZOSIN HYDROCHLORIDE 5 MG: 1 CAPSULE ORAL at 23:03

## 2019-11-27 RX ADMIN — FUROSEMIDE 80 MG: 10 INJECTION, SOLUTION INTRAMUSCULAR; INTRAVENOUS at 18:17

## 2019-11-27 RX ADMIN — HEPARIN SODIUM 5000 UNITS: 5000 INJECTION INTRAVENOUS; SUBCUTANEOUS at 22:44

## 2019-11-27 RX ADMIN — DONEPEZIL HYDROCHLORIDE 10 MG: 5 TABLET, FILM COATED ORAL at 22:44

## 2019-11-27 RX ADMIN — SIMVASTATIN 40 MG: 40 TABLET, FILM COATED ORAL at 22:44

## 2019-11-27 RX ADMIN — Medication 10 ML: at 23:04

## 2019-11-27 RX ADMIN — NITROGLYCERIN 0.5 INCH: 20 OINTMENT TOPICAL at 18:18

## 2019-11-27 RX ADMIN — FUROSEMIDE 5 MG/HR: 10 INJECTION, SOLUTION INTRAMUSCULAR; INTRAVENOUS at 22:53

## 2019-11-27 RX ADMIN — METHYLPREDNISOLONE SODIUM SUCCINATE 40 MG: 40 INJECTION, POWDER, FOR SOLUTION INTRAMUSCULAR; INTRAVENOUS at 17:37

## 2019-11-27 RX ADMIN — IPRATROPIUM BROMIDE AND ALBUTEROL SULFATE 1 AMPULE: .5; 3 SOLUTION RESPIRATORY (INHALATION) at 17:11

## 2019-11-27 ASSESSMENT — PAIN SCALES - GENERAL
PAINLEVEL_OUTOF10: 0
PAINLEVEL_OUTOF10: 0

## 2019-11-28 ENCOUNTER — APPOINTMENT (OUTPATIENT)
Dept: GENERAL RADIOLOGY | Age: 84
DRG: 291 | End: 2019-11-28
Payer: MEDICARE

## 2019-11-28 LAB
ANION GAP SERPL CALCULATED.3IONS-SCNC: 12 MMOL/L (ref 3–16)
BACTERIA: ABNORMAL /HPF
BILIRUBIN URINE: NEGATIVE
BLOOD, URINE: ABNORMAL
BUN BLDV-MCNC: 52 MG/DL (ref 7–20)
CALCIUM SERPL-MCNC: 8 MG/DL (ref 8.3–10.6)
CHLORIDE BLD-SCNC: 108 MMOL/L (ref 99–110)
CLARITY: CLEAR
CO2: 19 MMOL/L (ref 21–32)
COLOR: YELLOW
CREAT SERPL-MCNC: 3.4 MG/DL (ref 0.8–1.3)
CREATININE URINE: 36.7 MG/DL (ref 39–259)
EKG ATRIAL RATE: 85 BPM
EKG DIAGNOSIS: NORMAL
EKG P AXIS: 67 DEGREES
EKG Q-T INTERVAL: 420 MS
EKG QRS DURATION: 162 MS
EKG QTC CALCULATION (BAZETT): 496 MS
EKG R AXIS: -1 DEGREES
EKG T AXIS: 150 DEGREES
EKG VENTRICULAR RATE: 84 BPM
EPITHELIAL CELLS, UA: ABNORMAL /HPF
GFR AFRICAN AMERICAN: 21
GFR NON-AFRICAN AMERICAN: 17
GLUCOSE BLD-MCNC: 168 MG/DL (ref 70–99)
GLUCOSE URINE: NEGATIVE MG/DL
KETONES, URINE: NEGATIVE MG/DL
LEUKOCYTE ESTERASE, URINE: NEGATIVE
MICROSCOPIC EXAMINATION: YES
NITRITE, URINE: NEGATIVE
PH UA: 5 (ref 5–8)
POTASSIUM REFLEX MAGNESIUM: 5 MMOL/L (ref 3.5–5.1)
PROTEIN PROTEIN: 6 MG/DL
PROTEIN UA: NEGATIVE MG/DL
RBC UA: ABNORMAL /HPF (ref 0–2)
SODIUM BLD-SCNC: 139 MMOL/L (ref 136–145)
SODIUM URINE: 103 MMOL/L
SPECIFIC GRAVITY UA: 1.01 (ref 1–1.03)
TROPONIN: 0.26 NG/ML
TROPONIN: 0.27 NG/ML
URINE TYPE: ABNORMAL
UROBILINOGEN, URINE: 0.2 E.U./DL
WBC UA: ABNORMAL /HPF (ref 0–5)

## 2019-11-28 PROCEDURE — 84484 ASSAY OF TROPONIN QUANT: CPT

## 2019-11-28 PROCEDURE — 99223 1ST HOSP IP/OBS HIGH 75: CPT | Performed by: INTERNAL MEDICINE

## 2019-11-28 PROCEDURE — 6370000000 HC RX 637 (ALT 250 FOR IP): Performed by: INTERNAL MEDICINE

## 2019-11-28 PROCEDURE — 1200000000 HC SEMI PRIVATE

## 2019-11-28 PROCEDURE — 2700000000 HC OXYGEN THERAPY PER DAY

## 2019-11-28 PROCEDURE — 81001 URINALYSIS AUTO W/SCOPE: CPT

## 2019-11-28 PROCEDURE — 82570 ASSAY OF URINE CREATININE: CPT

## 2019-11-28 PROCEDURE — 80048 BASIC METABOLIC PNL TOTAL CA: CPT

## 2019-11-28 PROCEDURE — 2580000003 HC RX 258: Performed by: INTERNAL MEDICINE

## 2019-11-28 PROCEDURE — 71046 X-RAY EXAM CHEST 2 VIEWS: CPT

## 2019-11-28 PROCEDURE — 94761 N-INVAS EAR/PLS OXIMETRY MLT: CPT

## 2019-11-28 PROCEDURE — 84156 ASSAY OF PROTEIN URINE: CPT

## 2019-11-28 PROCEDURE — 84300 ASSAY OF URINE SODIUM: CPT

## 2019-11-28 PROCEDURE — 93010 ELECTROCARDIOGRAM REPORT: CPT | Performed by: INTERNAL MEDICINE

## 2019-11-28 PROCEDURE — 6360000002 HC RX W HCPCS: Performed by: INTERNAL MEDICINE

## 2019-11-28 PROCEDURE — 36415 COLL VENOUS BLD VENIPUNCTURE: CPT

## 2019-11-28 RX ADMIN — CITALOPRAM HYDROBROMIDE 10 MG: 20 TABLET ORAL at 09:19

## 2019-11-28 RX ADMIN — HEPARIN SODIUM 5000 UNITS: 5000 INJECTION INTRAVENOUS; SUBCUTANEOUS at 21:49

## 2019-11-28 RX ADMIN — Medication 10 ML: at 21:50

## 2019-11-28 RX ADMIN — DONEPEZIL HYDROCHLORIDE 10 MG: 5 TABLET, FILM COATED ORAL at 21:49

## 2019-11-28 RX ADMIN — CARVEDILOL 3.12 MG: 3.12 TABLET, FILM COATED ORAL at 17:28

## 2019-11-28 RX ADMIN — ASPIRIN 81 MG: 81 TABLET, COATED ORAL at 09:19

## 2019-11-28 RX ADMIN — HEPARIN SODIUM 5000 UNITS: 5000 INJECTION INTRAVENOUS; SUBCUTANEOUS at 06:03

## 2019-11-28 RX ADMIN — HEPARIN SODIUM 5000 UNITS: 5000 INJECTION INTRAVENOUS; SUBCUTANEOUS at 14:59

## 2019-11-28 RX ADMIN — SIMVASTATIN 40 MG: 40 TABLET, FILM COATED ORAL at 21:49

## 2019-11-28 RX ADMIN — LEVOTHYROXINE SODIUM 175 MCG: 150 TABLET ORAL at 06:03

## 2019-11-28 RX ADMIN — FAMOTIDINE 20 MG: 20 TABLET ORAL at 09:19

## 2019-11-28 RX ADMIN — CARVEDILOL 3.12 MG: 3.12 TABLET, FILM COATED ORAL at 09:19

## 2019-11-28 RX ADMIN — CETIRIZINE HYDROCHLORIDE 5 MG: 5 TABLET ORAL at 09:19

## 2019-11-28 RX ADMIN — TERAZOSIN HYDROCHLORIDE 5 MG: 1 CAPSULE ORAL at 21:49

## 2019-11-28 ASSESSMENT — PAIN SCALES - GENERAL
PAINLEVEL_OUTOF10: 0

## 2019-11-29 ENCOUNTER — APPOINTMENT (OUTPATIENT)
Dept: ULTRASOUND IMAGING | Age: 84
DRG: 291 | End: 2019-11-29
Payer: MEDICARE

## 2019-11-29 LAB
ALBUMIN SERPL-MCNC: 3.8 G/DL (ref 3.4–5)
ANION GAP SERPL CALCULATED.3IONS-SCNC: 14 MMOL/L (ref 3–16)
BUN BLDV-MCNC: 59 MG/DL (ref 7–20)
CALCIUM SERPL-MCNC: 8.3 MG/DL (ref 8.3–10.6)
CHLORIDE BLD-SCNC: 105 MMOL/L (ref 99–110)
CO2: 21 MMOL/L (ref 21–32)
CREAT SERPL-MCNC: 3.2 MG/DL (ref 0.8–1.3)
FERRITIN: 202.9 NG/ML (ref 30–400)
GFR AFRICAN AMERICAN: 23
GFR NON-AFRICAN AMERICAN: 19
GLUCOSE BLD-MCNC: 103 MG/DL (ref 70–99)
HAPTOGLOBIN: 183 MG/DL (ref 30–200)
HCT VFR BLD CALC: 27.8 % (ref 40.5–52.5)
IMMATURE RETIC FRACT: 0.45 (ref 0.21–0.37)
IRON SATURATION: 33 % (ref 20–50)
IRON: 65 UG/DL (ref 59–158)
LACTATE DEHYDROGENASE: 177 U/L (ref 100–190)
LV EF: 43 %
LVEF MODALITY: NORMAL
MAGNESIUM: 2.4 MG/DL (ref 1.8–2.4)
PHOSPHORUS: 3.7 MG/DL (ref 2.5–4.9)
POTASSIUM REFLEX MAGNESIUM: 4.7 MMOL/L (ref 3.5–5.1)
POTASSIUM SERPL-SCNC: 4.7 MMOL/L (ref 3.5–5.1)
RETICULOCYTE ABSOLUTE COUNT: 0.07 M/UL
RETICULOCYTE COUNT PCT: 2.28 % (ref 0.5–2.18)
SODIUM BLD-SCNC: 140 MMOL/L (ref 136–145)
TOTAL IRON BINDING CAPACITY: 195 UG/DL (ref 260–445)
TRANSFERRIN: 147 MG/DL (ref 200–360)
URIC ACID, SERUM: 9.8 MG/DL (ref 3.5–7.2)

## 2019-11-29 PROCEDURE — 2580000003 HC RX 258: Performed by: INTERNAL MEDICINE

## 2019-11-29 PROCEDURE — 6360000002 HC RX W HCPCS: Performed by: INTERNAL MEDICINE

## 2019-11-29 PROCEDURE — 83010 ASSAY OF HAPTOGLOBIN QUANT: CPT

## 2019-11-29 PROCEDURE — 84466 ASSAY OF TRANSFERRIN: CPT

## 2019-11-29 PROCEDURE — 83883 ASSAY NEPHELOMETRY NOT SPEC: CPT

## 2019-11-29 PROCEDURE — 83735 ASSAY OF MAGNESIUM: CPT

## 2019-11-29 PROCEDURE — 1200000000 HC SEMI PRIVATE

## 2019-11-29 PROCEDURE — 76770 US EXAM ABDO BACK WALL COMP: CPT

## 2019-11-29 PROCEDURE — 99232 SBSQ HOSP IP/OBS MODERATE 35: CPT | Performed by: INTERNAL MEDICINE

## 2019-11-29 PROCEDURE — 93306 TTE W/DOPPLER COMPLETE: CPT

## 2019-11-29 PROCEDURE — 6370000000 HC RX 637 (ALT 250 FOR IP): Performed by: INTERNAL MEDICINE

## 2019-11-29 PROCEDURE — 84550 ASSAY OF BLOOD/URIC ACID: CPT

## 2019-11-29 PROCEDURE — 83540 ASSAY OF IRON: CPT

## 2019-11-29 PROCEDURE — 82728 ASSAY OF FERRITIN: CPT

## 2019-11-29 PROCEDURE — 85045 AUTOMATED RETICULOCYTE COUNT: CPT

## 2019-11-29 PROCEDURE — 80069 RENAL FUNCTION PANEL: CPT

## 2019-11-29 PROCEDURE — 2700000000 HC OXYGEN THERAPY PER DAY

## 2019-11-29 PROCEDURE — 83615 LACTATE (LD) (LDH) ENZYME: CPT

## 2019-11-29 PROCEDURE — 36415 COLL VENOUS BLD VENIPUNCTURE: CPT

## 2019-11-29 RX ORDER — SODIUM CHLORIDE, SODIUM GLUCONATE, SODIUM ACETATE, POTASSIUM CHLORIDE AND MAGNESIUM CHLORIDE 526; 502; 368; 37; 30 MG/100ML; MG/100ML; MG/100ML; MG/100ML; MG/100ML
1000 INJECTION, SOLUTION INTRAVENOUS CONTINUOUS
Status: DISPENSED | OUTPATIENT
Start: 2019-11-29 | End: 2019-11-29

## 2019-11-29 RX ADMIN — DONEPEZIL HYDROCHLORIDE 10 MG: 5 TABLET, FILM COATED ORAL at 20:16

## 2019-11-29 RX ADMIN — FAMOTIDINE 20 MG: 20 TABLET ORAL at 09:10

## 2019-11-29 RX ADMIN — ASPIRIN 81 MG: 81 TABLET, COATED ORAL at 09:10

## 2019-11-29 RX ADMIN — HEPARIN SODIUM 5000 UNITS: 5000 INJECTION INTRAVENOUS; SUBCUTANEOUS at 20:16

## 2019-11-29 RX ADMIN — Medication 10 ML: at 09:12

## 2019-11-29 RX ADMIN — SODIUM CHLORIDE, SODIUM GLUCONATE, SODIUM ACETATE, POTASSIUM CHLORIDE AND MAGNESIUM CHLORIDE 1000 ML: 526; 502; 368; 37; 30 INJECTION, SOLUTION INTRAVENOUS at 14:24

## 2019-11-29 RX ADMIN — CITALOPRAM HYDROBROMIDE 10 MG: 20 TABLET ORAL at 09:10

## 2019-11-29 RX ADMIN — HEPARIN SODIUM 5000 UNITS: 5000 INJECTION INTRAVENOUS; SUBCUTANEOUS at 14:14

## 2019-11-29 RX ADMIN — CETIRIZINE HYDROCHLORIDE 5 MG: 5 TABLET ORAL at 09:10

## 2019-11-29 RX ADMIN — TERAZOSIN HYDROCHLORIDE 5 MG: 1 CAPSULE ORAL at 20:59

## 2019-11-29 RX ADMIN — LEVOTHYROXINE SODIUM 175 MCG: 150 TABLET ORAL at 06:56

## 2019-11-29 RX ADMIN — SIMVASTATIN 40 MG: 40 TABLET, FILM COATED ORAL at 20:16

## 2019-11-29 RX ADMIN — CARVEDILOL 3.12 MG: 3.12 TABLET, FILM COATED ORAL at 18:05

## 2019-11-29 RX ADMIN — HEPARIN SODIUM 5000 UNITS: 5000 INJECTION INTRAVENOUS; SUBCUTANEOUS at 06:56

## 2019-11-29 ASSESSMENT — PAIN SCALES - GENERAL
PAINLEVEL_OUTOF10: 0

## 2019-11-29 ASSESSMENT — ENCOUNTER SYMPTOMS
SORE THROAT: 0
WHEEZING: 1
VOMITING: 0
ABDOMINAL DISTENTION: 0
EYE REDNESS: 0
COLOR CHANGE: 0
VOICE CHANGE: 0
CHOKING: 0
ABDOMINAL PAIN: 0
SHORTNESS OF BREATH: 1
STRIDOR: 0
COUGH: 1

## 2019-11-30 LAB
ALBUMIN SERPL-MCNC: 3.4 G/DL (ref 3.4–5)
ANION GAP SERPL CALCULATED.3IONS-SCNC: 11 MMOL/L (ref 3–16)
BUN BLDV-MCNC: 55 MG/DL (ref 7–20)
CALCIUM SERPL-MCNC: 7.9 MG/DL (ref 8.3–10.6)
CHLORIDE BLD-SCNC: 108 MMOL/L (ref 99–110)
CO2: 23 MMOL/L (ref 21–32)
CREAT SERPL-MCNC: 2.8 MG/DL (ref 0.8–1.3)
GFR AFRICAN AMERICAN: 26
GFR NON-AFRICAN AMERICAN: 22
GLUCOSE BLD-MCNC: 92 MG/DL (ref 70–99)
KAPPA, FREE LIGHT CHAINS, SERUM: 69.96 MG/L (ref 3.3–19.4)
KAPPA/LAMBDA RATIO: 2.73 (ref 0.26–1.65)
KAPPA/LAMBDA TEST COMMENT: ABNORMAL
LAMBDA, FREE LIGHT CHAINS, SERUM: 25.63 MG/L (ref 5.71–26.3)
PHOSPHORUS: 3.1 MG/DL (ref 2.5–4.9)
POTASSIUM REFLEX MAGNESIUM: 4.7 MMOL/L (ref 3.5–5.1)
POTASSIUM SERPL-SCNC: 4.7 MMOL/L (ref 3.5–5.1)
SODIUM BLD-SCNC: 142 MMOL/L (ref 136–145)

## 2019-11-30 PROCEDURE — 6360000002 HC RX W HCPCS: Performed by: INTERNAL MEDICINE

## 2019-11-30 PROCEDURE — 6370000000 HC RX 637 (ALT 250 FOR IP): Performed by: INTERNAL MEDICINE

## 2019-11-30 PROCEDURE — 80069 RENAL FUNCTION PANEL: CPT

## 2019-11-30 PROCEDURE — 2580000003 HC RX 258: Performed by: INTERNAL MEDICINE

## 2019-11-30 PROCEDURE — 2700000000 HC OXYGEN THERAPY PER DAY

## 2019-11-30 PROCEDURE — 94761 N-INVAS EAR/PLS OXIMETRY MLT: CPT

## 2019-11-30 PROCEDURE — 36415 COLL VENOUS BLD VENIPUNCTURE: CPT

## 2019-11-30 PROCEDURE — 1200000000 HC SEMI PRIVATE

## 2019-11-30 PROCEDURE — 6370000000 HC RX 637 (ALT 250 FOR IP): Performed by: FAMILY MEDICINE

## 2019-11-30 PROCEDURE — 99233 SBSQ HOSP IP/OBS HIGH 50: CPT | Performed by: INTERNAL MEDICINE

## 2019-11-30 RX ORDER — ISOSORBIDE MONONITRATE 30 MG/1
30 TABLET, EXTENDED RELEASE ORAL DAILY
Status: DISCONTINUED | OUTPATIENT
Start: 2019-11-30 | End: 2019-12-01 | Stop reason: HOSPADM

## 2019-11-30 RX ORDER — HYDRALAZINE HYDROCHLORIDE 25 MG/1
25 TABLET, FILM COATED ORAL 2 TIMES DAILY
Status: DISCONTINUED | OUTPATIENT
Start: 2019-11-30 | End: 2019-12-01 | Stop reason: HOSPADM

## 2019-11-30 RX ORDER — NICOTINE 21 MG/24HR
1 PATCH, TRANSDERMAL 24 HOURS TRANSDERMAL DAILY
Status: DISCONTINUED | OUTPATIENT
Start: 2019-11-30 | End: 2019-12-01 | Stop reason: HOSPADM

## 2019-11-30 RX ORDER — FUROSEMIDE 20 MG/1
20 TABLET ORAL DAILY
Status: DISCONTINUED | OUTPATIENT
Start: 2019-11-30 | End: 2019-12-01 | Stop reason: HOSPADM

## 2019-11-30 RX ADMIN — HYDRALAZINE HYDROCHLORIDE 25 MG: 25 TABLET, FILM COATED ORAL at 11:56

## 2019-11-30 RX ADMIN — HEPARIN SODIUM 5000 UNITS: 5000 INJECTION INTRAVENOUS; SUBCUTANEOUS at 06:37

## 2019-11-30 RX ADMIN — CITALOPRAM HYDROBROMIDE 10 MG: 20 TABLET ORAL at 09:06

## 2019-11-30 RX ADMIN — CARVEDILOL 3.12 MG: 3.12 TABLET, FILM COATED ORAL at 09:06

## 2019-11-30 RX ADMIN — Medication 10 ML: at 09:06

## 2019-11-30 RX ADMIN — Medication 10 ML: at 20:36

## 2019-11-30 RX ADMIN — SIMVASTATIN 40 MG: 40 TABLET, FILM COATED ORAL at 20:35

## 2019-11-30 RX ADMIN — HYDRALAZINE HYDROCHLORIDE 25 MG: 25 TABLET, FILM COATED ORAL at 17:56

## 2019-11-30 RX ADMIN — LEVOTHYROXINE SODIUM 175 MCG: 150 TABLET ORAL at 06:36

## 2019-11-30 RX ADMIN — ASPIRIN 81 MG: 81 TABLET, COATED ORAL at 09:06

## 2019-11-30 RX ADMIN — FUROSEMIDE 20 MG: 20 TABLET ORAL at 18:30

## 2019-11-30 RX ADMIN — ISOSORBIDE MONONITRATE 30 MG: 30 TABLET, EXTENDED RELEASE ORAL at 11:56

## 2019-11-30 RX ADMIN — HEPARIN SODIUM 5000 UNITS: 5000 INJECTION INTRAVENOUS; SUBCUTANEOUS at 14:13

## 2019-11-30 RX ADMIN — DONEPEZIL HYDROCHLORIDE 10 MG: 5 TABLET, FILM COATED ORAL at 20:35

## 2019-11-30 RX ADMIN — FAMOTIDINE 20 MG: 20 TABLET ORAL at 09:06

## 2019-11-30 RX ADMIN — CETIRIZINE HYDROCHLORIDE 5 MG: 5 TABLET ORAL at 09:06

## 2019-11-30 RX ADMIN — HEPARIN SODIUM 5000 UNITS: 5000 INJECTION INTRAVENOUS; SUBCUTANEOUS at 22:08

## 2019-11-30 RX ADMIN — TERAZOSIN HYDROCHLORIDE 5 MG: 1 CAPSULE ORAL at 20:35

## 2019-11-30 ASSESSMENT — PAIN SCALES - GENERAL
PAINLEVEL_OUTOF10: 0

## 2019-12-01 VITALS
SYSTOLIC BLOOD PRESSURE: 117 MMHG | WEIGHT: 146.9 LBS | HEIGHT: 69 IN | HEART RATE: 62 BPM | OXYGEN SATURATION: 95 % | TEMPERATURE: 97.8 F | DIASTOLIC BLOOD PRESSURE: 54 MMHG | RESPIRATION RATE: 16 BRPM | BODY MASS INDEX: 21.76 KG/M2

## 2019-12-01 PROBLEM — I44.7 LEFT BUNDLE BRANCH BLOCK: Status: ACTIVE | Noted: 2019-12-01

## 2019-12-01 PROBLEM — I34.0 NONRHEUMATIC MITRAL VALVE REGURGITATION: Status: ACTIVE | Noted: 2019-12-01

## 2019-12-01 PROBLEM — I25.5 ISCHEMIC CARDIOMYOPATHY: Status: ACTIVE | Noted: 2019-12-01

## 2019-12-01 LAB
ALBUMIN SERPL-MCNC: 3.3 G/DL (ref 3.4–5)
ANION GAP SERPL CALCULATED.3IONS-SCNC: 12 MMOL/L (ref 3–16)
BUN BLDV-MCNC: 50 MG/DL (ref 7–20)
CALCIUM SERPL-MCNC: 8 MG/DL (ref 8.3–10.6)
CHLORIDE BLD-SCNC: 108 MMOL/L (ref 99–110)
CO2: 23 MMOL/L (ref 21–32)
CREAT SERPL-MCNC: 2.7 MG/DL (ref 0.8–1.3)
GFR AFRICAN AMERICAN: 27
GFR NON-AFRICAN AMERICAN: 23
GLUCOSE BLD-MCNC: 90 MG/DL (ref 70–99)
HCT VFR BLD CALC: 26.4 % (ref 40.5–52.5)
HEMOGLOBIN: 8.9 G/DL (ref 13.5–17.5)
MCH RBC QN AUTO: 31.6 PG (ref 26–34)
MCHC RBC AUTO-ENTMCNC: 33.6 G/DL (ref 31–36)
MCV RBC AUTO: 93.8 FL (ref 80–100)
PDW BLD-RTO: 13 % (ref 12.4–15.4)
PHOSPHORUS: 2.9 MG/DL (ref 2.5–4.9)
PLATELET # BLD: 111 K/UL (ref 135–450)
PMV BLD AUTO: 8.6 FL (ref 5–10.5)
POTASSIUM REFLEX MAGNESIUM: 4.3 MMOL/L (ref 3.5–5.1)
POTASSIUM SERPL-SCNC: 4.3 MMOL/L (ref 3.5–5.1)
RBC # BLD: 2.81 M/UL (ref 4.2–5.9)
SODIUM BLD-SCNC: 143 MMOL/L (ref 136–145)
WBC # BLD: 6.1 K/UL (ref 4–11)

## 2019-12-01 PROCEDURE — 99233 SBSQ HOSP IP/OBS HIGH 50: CPT | Performed by: NURSE PRACTITIONER

## 2019-12-01 PROCEDURE — 2580000003 HC RX 258: Performed by: INTERNAL MEDICINE

## 2019-12-01 PROCEDURE — 6360000002 HC RX W HCPCS: Performed by: INTERNAL MEDICINE

## 2019-12-01 PROCEDURE — 85027 COMPLETE CBC AUTOMATED: CPT

## 2019-12-01 PROCEDURE — 6370000000 HC RX 637 (ALT 250 FOR IP): Performed by: INTERNAL MEDICINE

## 2019-12-01 PROCEDURE — 80069 RENAL FUNCTION PANEL: CPT

## 2019-12-01 PROCEDURE — 6370000000 HC RX 637 (ALT 250 FOR IP): Performed by: FAMILY MEDICINE

## 2019-12-01 PROCEDURE — 36415 COLL VENOUS BLD VENIPUNCTURE: CPT

## 2019-12-01 RX ORDER — HYDRALAZINE HYDROCHLORIDE 25 MG/1
25 TABLET, FILM COATED ORAL 2 TIMES DAILY
Qty: 60 TABLET | Refills: 3 | Status: SHIPPED | OUTPATIENT
Start: 2019-12-01 | End: 2019-12-01

## 2019-12-01 RX ORDER — HYDRALAZINE HYDROCHLORIDE 25 MG/1
25 TABLET, FILM COATED ORAL 2 TIMES DAILY
Qty: 60 TABLET | Refills: 3 | Status: SHIPPED | OUTPATIENT
Start: 2019-12-01 | End: 2020-03-18

## 2019-12-01 RX ORDER — ISOSORBIDE MONONITRATE 30 MG/1
30 TABLET, EXTENDED RELEASE ORAL DAILY
Qty: 30 TABLET | Refills: 3 | Status: SHIPPED | OUTPATIENT
Start: 2019-12-02 | End: 2019-12-01

## 2019-12-01 RX ORDER — ISOSORBIDE MONONITRATE 30 MG/1
30 TABLET, EXTENDED RELEASE ORAL DAILY
Qty: 30 TABLET | Refills: 3 | Status: SHIPPED | OUTPATIENT
Start: 2019-12-02 | End: 2020-03-18

## 2019-12-01 RX ORDER — FUROSEMIDE 20 MG/1
20 TABLET ORAL DAILY
Qty: 60 TABLET | Refills: 1 | Status: ON HOLD | OUTPATIENT
Start: 2019-12-01 | End: 2019-12-25 | Stop reason: SDUPTHER

## 2019-12-01 RX ADMIN — DARBEPOETIN ALFA 25 MCG: 25 INJECTION, SOLUTION INTRAVENOUS; SUBCUTANEOUS at 12:26

## 2019-12-01 RX ADMIN — ASPIRIN 81 MG: 81 TABLET, COATED ORAL at 08:36

## 2019-12-01 RX ADMIN — FUROSEMIDE 20 MG: 20 TABLET ORAL at 08:36

## 2019-12-01 RX ADMIN — CARVEDILOL 3.12 MG: 3.12 TABLET, FILM COATED ORAL at 08:36

## 2019-12-01 RX ADMIN — ISOSORBIDE MONONITRATE 30 MG: 30 TABLET, EXTENDED RELEASE ORAL at 08:36

## 2019-12-01 RX ADMIN — HYDRALAZINE HYDROCHLORIDE 25 MG: 25 TABLET, FILM COATED ORAL at 08:36

## 2019-12-01 RX ADMIN — FAMOTIDINE 20 MG: 20 TABLET ORAL at 08:36

## 2019-12-01 RX ADMIN — Medication 10 ML: at 08:38

## 2019-12-01 RX ADMIN — CETIRIZINE HYDROCHLORIDE 5 MG: 5 TABLET ORAL at 08:36

## 2019-12-01 RX ADMIN — HEPARIN SODIUM 5000 UNITS: 5000 INJECTION INTRAVENOUS; SUBCUTANEOUS at 06:19

## 2019-12-01 RX ADMIN — LEVOTHYROXINE SODIUM 175 MCG: 150 TABLET ORAL at 06:19

## 2019-12-01 RX ADMIN — CITALOPRAM HYDROBROMIDE 10 MG: 20 TABLET ORAL at 08:36

## 2019-12-01 ASSESSMENT — PAIN SCALES - GENERAL
PAINLEVEL_OUTOF10: 0

## 2019-12-02 ENCOUNTER — FOLLOWUP TELEPHONE ENCOUNTER (OUTPATIENT)
Dept: CARDIAC REHAB | Age: 84
End: 2019-12-02

## 2019-12-02 ENCOUNTER — TELEPHONE (OUTPATIENT)
Dept: FAMILY MEDICINE CLINIC | Age: 84
End: 2019-12-02

## 2019-12-06 ENCOUNTER — OFFICE VISIT (OUTPATIENT)
Dept: FAMILY MEDICINE CLINIC | Age: 84
End: 2019-12-06
Payer: MEDICARE

## 2019-12-06 VITALS
WEIGHT: 153 LBS | SYSTOLIC BLOOD PRESSURE: 126 MMHG | DIASTOLIC BLOOD PRESSURE: 52 MMHG | OXYGEN SATURATION: 98 % | HEIGHT: 68 IN | BODY MASS INDEX: 23.19 KG/M2 | HEART RATE: 69 BPM

## 2019-12-06 DIAGNOSIS — I10 ESSENTIAL HYPERTENSION, BENIGN: ICD-10-CM

## 2019-12-06 DIAGNOSIS — I50.23 ACUTE ON CHRONIC SYSTOLIC CONGESTIVE HEART FAILURE (HCC): Primary | ICD-10-CM

## 2019-12-06 DIAGNOSIS — N17.9 AKI (ACUTE KIDNEY INJURY) (HCC): ICD-10-CM

## 2019-12-06 PROCEDURE — 99214 OFFICE O/P EST MOD 30 MIN: CPT | Performed by: FAMILY MEDICINE

## 2019-12-06 ASSESSMENT — ENCOUNTER SYMPTOMS: SHORTNESS OF BREATH: 0

## 2019-12-23 ENCOUNTER — APPOINTMENT (OUTPATIENT)
Dept: GENERAL RADIOLOGY | Age: 84
DRG: 291 | End: 2019-12-23
Payer: MEDICARE

## 2019-12-23 ENCOUNTER — HOSPITAL ENCOUNTER (INPATIENT)
Age: 84
LOS: 2 days | Discharge: HOME | DRG: 291 | End: 2019-12-25
Attending: EMERGENCY MEDICINE | Admitting: INTERNAL MEDICINE
Payer: MEDICARE

## 2019-12-23 ENCOUNTER — TELEPHONE (OUTPATIENT)
Dept: OTHER | Facility: CLINIC | Age: 84
End: 2019-12-23

## 2019-12-23 PROBLEM — I50.23 ACUTE ON CHRONIC SYSTOLIC CHF (CONGESTIVE HEART FAILURE) (HCC): Status: ACTIVE | Noted: 2019-12-23

## 2019-12-23 LAB
A/G RATIO: 1.5 (ref 1.1–2.2)
ALBUMIN SERPL-MCNC: 3.7 G/DL (ref 3.4–5)
ALP BLD-CCNC: 72 U/L (ref 40–129)
ALT SERPL-CCNC: 10 U/L (ref 10–40)
AMORPHOUS: ABNORMAL /HPF
ANION GAP SERPL CALCULATED.3IONS-SCNC: 14 MMOL/L (ref 3–16)
AST SERPL-CCNC: 12 U/L (ref 15–37)
BASOPHILS ABSOLUTE: 0.1 K/UL (ref 0–0.2)
BASOPHILS RELATIVE PERCENT: 1.2 %
BILIRUB SERPL-MCNC: 0.5 MG/DL (ref 0–1)
BILIRUBIN URINE: NEGATIVE
BLOOD, URINE: NEGATIVE
BUN BLDV-MCNC: 41 MG/DL (ref 7–20)
CALCIUM SERPL-MCNC: 8.2 MG/DL (ref 8.3–10.6)
CASTS: ABNORMAL /LPF
CHLORIDE BLD-SCNC: 103 MMOL/L (ref 99–110)
CLARITY: CLEAR
CO2: 24 MMOL/L (ref 21–32)
COLOR: YELLOW
CREAT SERPL-MCNC: 2.7 MG/DL (ref 0.8–1.3)
EOSINOPHILS ABSOLUTE: 0.1 K/UL (ref 0–0.6)
EOSINOPHILS RELATIVE PERCENT: 2.1 %
GFR AFRICAN AMERICAN: 27
GFR NON-AFRICAN AMERICAN: 23
GLOBULIN: 2.5 G/DL
GLUCOSE BLD-MCNC: 112 MG/DL (ref 70–99)
GLUCOSE URINE: NEGATIVE MG/DL
HCT VFR BLD CALC: 30 % (ref 40.5–52.5)
HEMOGLOBIN: 9.9 G/DL (ref 13.5–17.5)
IRON SATURATION: 25 % (ref 20–50)
IRON: 47 UG/DL (ref 59–158)
KETONES, URINE: NEGATIVE MG/DL
LACTIC ACID: 1.4 MMOL/L (ref 0.4–2)
LEUKOCYTE ESTERASE, URINE: NEGATIVE
LYMPHOCYTES ABSOLUTE: 1 K/UL (ref 1–5.1)
LYMPHOCYTES RELATIVE PERCENT: 16.1 %
MCH RBC QN AUTO: 31.3 PG (ref 26–34)
MCHC RBC AUTO-ENTMCNC: 32.9 G/DL (ref 31–36)
MCV RBC AUTO: 95.3 FL (ref 80–100)
MICROSCOPIC EXAMINATION: YES
MONOCYTES ABSOLUTE: 0.5 K/UL (ref 0–1.3)
MONOCYTES RELATIVE PERCENT: 8.1 %
MUCUS: ABNORMAL /LPF
NEUTROPHILS ABSOLUTE: 4.4 K/UL (ref 1.7–7.7)
NEUTROPHILS RELATIVE PERCENT: 72.5 %
NITRITE, URINE: NEGATIVE
PDW BLD-RTO: 14.3 % (ref 12.4–15.4)
PH UA: 5.5 (ref 5–8)
PLATELET # BLD: 156 K/UL (ref 135–450)
PMV BLD AUTO: 7.9 FL (ref 5–10.5)
POTASSIUM SERPL-SCNC: 4.3 MMOL/L (ref 3.5–5.1)
PRO-BNP: ABNORMAL PG/ML (ref 0–449)
PROTEIN UA: 30 MG/DL
RBC # BLD: 3.15 M/UL (ref 4.2–5.9)
RBC UA: ABNORMAL /HPF (ref 0–2)
SODIUM BLD-SCNC: 141 MMOL/L (ref 136–145)
SPECIFIC GRAVITY UA: 1.02 (ref 1–1.03)
TOTAL IRON BINDING CAPACITY: 189 UG/DL (ref 260–445)
TOTAL PROTEIN: 6.2 G/DL (ref 6.4–8.2)
TROPONIN: 0.16 NG/ML
TROPONIN: 0.17 NG/ML
TROPONIN: 0.27 NG/ML
URINE TYPE: ABNORMAL
UROBILINOGEN, URINE: 0.2 E.U./DL
WBC # BLD: 6.1 K/UL (ref 4–11)
WBC UA: ABNORMAL /HPF (ref 0–5)

## 2019-12-23 PROCEDURE — 80053 COMPREHEN METABOLIC PANEL: CPT

## 2019-12-23 PROCEDURE — 83550 IRON BINDING TEST: CPT

## 2019-12-23 PROCEDURE — 71046 X-RAY EXAM CHEST 2 VIEWS: CPT

## 2019-12-23 PROCEDURE — 6360000002 HC RX W HCPCS: Performed by: EMERGENCY MEDICINE

## 2019-12-23 PROCEDURE — 1200000000 HC SEMI PRIVATE

## 2019-12-23 PROCEDURE — 83880 ASSAY OF NATRIURETIC PEPTIDE: CPT

## 2019-12-23 PROCEDURE — 83605 ASSAY OF LACTIC ACID: CPT

## 2019-12-23 PROCEDURE — 6370000000 HC RX 637 (ALT 250 FOR IP): Performed by: INTERNAL MEDICINE

## 2019-12-23 PROCEDURE — 2580000003 HC RX 258: Performed by: INTERNAL MEDICINE

## 2019-12-23 PROCEDURE — 94761 N-INVAS EAR/PLS OXIMETRY MLT: CPT

## 2019-12-23 PROCEDURE — 93005 ELECTROCARDIOGRAM TRACING: CPT | Performed by: EMERGENCY MEDICINE

## 2019-12-23 PROCEDURE — 99285 EMERGENCY DEPT VISIT HI MDM: CPT

## 2019-12-23 PROCEDURE — 81001 URINALYSIS AUTO W/SCOPE: CPT

## 2019-12-23 PROCEDURE — 84484 ASSAY OF TROPONIN QUANT: CPT

## 2019-12-23 PROCEDURE — 6360000002 HC RX W HCPCS: Performed by: INTERNAL MEDICINE

## 2019-12-23 PROCEDURE — 85025 COMPLETE CBC W/AUTO DIFF WBC: CPT

## 2019-12-23 PROCEDURE — 96374 THER/PROPH/DIAG INJ IV PUSH: CPT

## 2019-12-23 PROCEDURE — 36415 COLL VENOUS BLD VENIPUNCTURE: CPT

## 2019-12-23 PROCEDURE — 83540 ASSAY OF IRON: CPT

## 2019-12-23 PROCEDURE — 2700000000 HC OXYGEN THERAPY PER DAY

## 2019-12-23 RX ORDER — SODIUM CHLORIDE 0.9 % (FLUSH) 0.9 %
10 SYRINGE (ML) INJECTION EVERY 12 HOURS SCHEDULED
Status: DISCONTINUED | OUTPATIENT
Start: 2019-12-23 | End: 2019-12-25 | Stop reason: HOSPADM

## 2019-12-23 RX ORDER — FLUTICASONE PROPIONATE 50 MCG
1 SPRAY, SUSPENSION (ML) NASAL DAILY
Status: DISCONTINUED | OUTPATIENT
Start: 2019-12-23 | End: 2019-12-25 | Stop reason: HOSPADM

## 2019-12-23 RX ORDER — CARVEDILOL 3.12 MG/1
3.12 TABLET ORAL 2 TIMES DAILY WITH MEALS
Status: DISCONTINUED | OUTPATIENT
Start: 2019-12-23 | End: 2019-12-25 | Stop reason: HOSPADM

## 2019-12-23 RX ORDER — FUROSEMIDE 10 MG/ML
40 INJECTION INTRAMUSCULAR; INTRAVENOUS ONCE
Status: COMPLETED | OUTPATIENT
Start: 2019-12-23 | End: 2019-12-23

## 2019-12-23 RX ORDER — ASPIRIN 81 MG/1
81 TABLET ORAL DAILY
Status: DISCONTINUED | OUTPATIENT
Start: 2019-12-23 | End: 2019-12-25 | Stop reason: HOSPADM

## 2019-12-23 RX ORDER — HEPARIN SODIUM 5000 [USP'U]/ML
5000 INJECTION, SOLUTION INTRAVENOUS; SUBCUTANEOUS EVERY 8 HOURS SCHEDULED
Status: DISCONTINUED | OUTPATIENT
Start: 2019-12-23 | End: 2019-12-25 | Stop reason: HOSPADM

## 2019-12-23 RX ORDER — DONEPEZIL HYDROCHLORIDE 5 MG/1
10 TABLET, FILM COATED ORAL NIGHTLY
Status: DISCONTINUED | OUTPATIENT
Start: 2019-12-23 | End: 2019-12-25 | Stop reason: HOSPADM

## 2019-12-23 RX ORDER — DOXAZOSIN 2 MG/1
4 TABLET ORAL DAILY
Status: DISCONTINUED | OUTPATIENT
Start: 2019-12-23 | End: 2019-12-25 | Stop reason: HOSPADM

## 2019-12-23 RX ORDER — SODIUM CHLORIDE 0.9 % (FLUSH) 0.9 %
10 SYRINGE (ML) INJECTION PRN
Status: DISCONTINUED | OUTPATIENT
Start: 2019-12-23 | End: 2019-12-25 | Stop reason: HOSPADM

## 2019-12-23 RX ORDER — ONDANSETRON 2 MG/ML
4 INJECTION INTRAMUSCULAR; INTRAVENOUS EVERY 6 HOURS PRN
Status: DISCONTINUED | OUTPATIENT
Start: 2019-12-23 | End: 2019-12-25 | Stop reason: HOSPADM

## 2019-12-23 RX ORDER — CITALOPRAM 20 MG/1
10 TABLET ORAL DAILY
Status: DISCONTINUED | OUTPATIENT
Start: 2019-12-23 | End: 2019-12-25 | Stop reason: HOSPADM

## 2019-12-23 RX ORDER — FUROSEMIDE 10 MG/ML
40 INJECTION INTRAMUSCULAR; INTRAVENOUS 2 TIMES DAILY
Status: DISCONTINUED | OUTPATIENT
Start: 2019-12-23 | End: 2019-12-25

## 2019-12-23 RX ADMIN — CITALOPRAM HYDROBROMIDE 10 MG: 20 TABLET ORAL at 18:55

## 2019-12-23 RX ADMIN — FUROSEMIDE 40 MG: 10 INJECTION, SOLUTION INTRAMUSCULAR; INTRAVENOUS at 18:55

## 2019-12-23 RX ADMIN — CARVEDILOL 3.12 MG: 3.12 TABLET, FILM COATED ORAL at 18:55

## 2019-12-23 RX ADMIN — FUROSEMIDE 40 MG: 10 INJECTION, SOLUTION INTRAMUSCULAR; INTRAVENOUS at 12:13

## 2019-12-23 RX ADMIN — DOXAZOSIN 4 MG: 2 TABLET ORAL at 18:55

## 2019-12-23 RX ADMIN — Medication 10 ML: at 20:57

## 2019-12-23 RX ADMIN — FLUTICASONE PROPIONATE 1 SPRAY: 50 SPRAY, METERED NASAL at 18:55

## 2019-12-23 RX ADMIN — HEPARIN SODIUM 5000 UNITS: 5000 INJECTION INTRAVENOUS; SUBCUTANEOUS at 20:56

## 2019-12-23 RX ADMIN — ASPIRIN 81 MG: 81 TABLET, COATED ORAL at 18:55

## 2019-12-23 RX ADMIN — DONEPEZIL HYDROCHLORIDE 10 MG: 5 TABLET, FILM COATED ORAL at 20:56

## 2019-12-23 ASSESSMENT — ENCOUNTER SYMPTOMS
VOMITING: 0
NAUSEA: 0
SORE THROAT: 0
CONSTIPATION: 0
SHORTNESS OF BREATH: 1
COUGH: 0
DIARRHEA: 0
ABDOMINAL PAIN: 0
BACK PAIN: 0

## 2019-12-23 NOTE — H&P
hernia on the left side but denies any symptoms. Past Medical History:      Diagnosis Date    CAD (coronary artery disease) 8/4/2011    Cognitive impairment 8/4/2011    Essential hypertension, benign 8/4/2011    Hypothyroid 8/4/2011    PAD (peripheral artery disease) 8/4/2011    Pure hypercholesterolemia 8/4/2011       Past Surgical History:        Procedure Laterality Date    CORONARY ARTERY BYPASS GRAFT      KNEE SURGERY Left     knee replacement       Medications Prior to Admission:    Prior to Admission medications    Medication Sig Start Date End Date Taking?  Authorizing Provider   isosorbide mononitrate (IMDUR) 30 MG extended release tablet Take 1 tablet by mouth daily 12/2/19  Yes Cesar Small MD   hydrALAZINE (APRESOLINE) 25 MG tablet Take 1 tablet by mouth 2 times daily 12/1/19  Yes Cesar Small MD   furosemide (LASIX) 20 MG tablet Take 1 tablet by mouth daily 12/1/19  Yes Cesar Small MD   simvastatin (ZOCOR) 40 MG tablet TAKE 1 TABLET BY MOUTH EVERY DAY 11/11/19  Yes Eulas Soulier, MD   citalopram (CELEXA) 10 MG tablet TAKE 1 TABLET BY MOUTH DAILY 10/31/19  Yes Eulas Soulier, MD   ranitidine (ZANTAC) 150 MG tablet TAKE 1 TABLET BY MOUTH TWICE DAILY 10/31/19  Yes Eulas Soulier, MD   terazosin (HYTRIN) 5 MG capsule TAKE 1 CAPSULE BY MOUTH EVERY NIGHT 10/1/19  Yes Eulas Soulier, MD   donepezil (ARICEPT) 10 MG tablet TAKE 1 TABLET BY MOUTH EVERY NIGHT 8/23/19  Yes Eulas Soulier, MD   carvedilol (COREG) 3.125 MG tablet TAKE 1 TABLET BY MOUTH TWICE DAILY WITH MEALS 5/6/19  Yes Eulas Soulier, MD   levothyroxine (SYNTHROID) 175 MCG tablet Take 1 tablet by mouth daily 1/7/19  Yes Eulas Soulier, MD   fluticasone (FLONASE) 50 MCG/ACT nasal spray SHAKE LIQUID AND USE 1 SPRAY IN EACH NOSTRIL DAILY 11/15/17  Yes TREVOR Hernandez - CNP   senna-docusate (Ardie Merchant) 8.6-50 MG per tablet TAKE 2 TABLETS BY MOUTH DAILY AS NEEDED FOR CONSTIPATION 6/12/17  Yes Eulas Soulier, MD   loratadine grossly non-focal.  Psychiatric:  Alert and oriented, thought content appropriate, normal insight  Capillary Refill: Brisk,< 3 seconds   Peripheral Pulses: +2 palpable, equal bilaterally       Labs:   Recent Labs     12/23/19  1148   WBC 6.1   HGB 9.9*   HCT 30.0*        Recent Labs     12/23/19  1148      K 4.3      CO2 24   BUN 41*   CREATININE 2.7*   CALCIUM 8.2*     Recent Labs     12/23/19  1148   AST 12*   ALT 10   BILITOT 0.5   ALKPHOS 72       Recent Labs     12/23/19  1148   TROPONINI 0.27*       Urinalysis:    Lab Results   Component Value Date    NITRU Negative 12/23/2019    WBCUA 0-2 12/23/2019    BACTERIA 2+ 11/28/2019    RBCUA 0-2 12/23/2019    BLOODU Negative 12/23/2019    SPECGRAV 1.020 12/23/2019    GLUCOSEU Negative 12/23/2019    GLUCOSEU Negative 12/19/2011       EKG:  I have reviewed the EKG with the following interpretation: Undetermined rhythm, LBBB, nonspecific ST-T wave changes. Radiology:    I have reviewed the Imaging  with the following interpretation:   XR CHEST STANDARD (2 VW)   Final Result   Cardiomegaly and chronic pulmonary change with small bibasilar effusions and   adjacent bibasilar infiltrates. Active Hospital Problems    Diagnosis Date Noted    Ischemic cardiomyopathy [I25.5] 12/01/2019    Nonrheumatic mitral valve regurgitation [I34.0] 12/01/2019    Acute on chronic systolic congestive heart failure (HCC) [I50.23] 12/25/2016    BPH associated with nocturia [N40.1, R35.1] 10/25/2011    Essential hypertension, benign [I10] 08/04/2011    Hypothyroid [E03.9] 08/04/2011    Pure hypercholesterolemia [E78.00] 08/04/2011     ASSESSMENT/PLAN:  1.   Acute on chronic systolic CHF in the setting of severe mitral regurgitation  -As evidenced by clinical presentation, elevated proBNP 70 K; chest x-ray suggestive of pulmonary vascular congestion  -Reviewed last cardiology consultation note from 12/1/2019 (by Dr. Xin Mitchell) -stated patient is very high risk for mitral valve repair given his comorbid conditions. Stated that he could be considered for mitral clip and Bi- V pacemaker/Defibrillator.  -Received  IV 40 mg Lasix in ED -continue twice daily  -Monitor strict I's/O; CHF RN consult; cardiology consultation to assist with management  -Reviewed last echo dated 11/29 /2019 -LVEF 40 to 45%; LV is mildly dilated; HK of basal inferior and basal inferoseptal, apex and all apical wall segments. LA is moderately dilated. Severe MR; moderate aortic stenosis. Moderate pulmonary hypertension.  -Continue aspirin, statin, Coreg, not on ACE/ARB secondary to CKD    2. Hypertension -optimal control on home medications; continue    3. CKD stage III -baseline creatinine 2.7; follows with Dr. Zoran Mcgowan; monitor BMP in the setting of IV diuresis. 4.  Hypothyroidism; continue home dose of Synthroid 175 mcg    5. Mild cognitive impairment -at baseline mentation on admission; continue Aricept. 6.  Ischemic cardiomyopathy -management as above    7. History of tobacco use -patient reports that he quit smoking since last discharge 2 weeks ago. Encouraged abstinence. Offered nicotine patch but patient declined. 8.  Elevated troponin (POA) -initial troponin 0 0.27 - likely in the setting of CHF exacerbation and CKD . Not consistent with ACS as patient denies any chest pain/worsening symptoms. Trend troponin. DVT Prophylaxis: Heparin subcu 5000 units 3 times daily  Diet: No diet orders on file  Code Status: Prior    PT/OT Eval Status: Not yet ordered    Dispo -anticipate more than 2 midnight stay in the hospital     Nathaniel Block MD    The note was completed using Dragon -speech recognition software & EMR  . Every effort was made to ensure accuracy; however, inadvertent computerized transcription errors may be present. Thank you Irvin Caruso MD for the opportunity to be involved in this patient's care.  If you have any questions or concerns please

## 2019-12-23 NOTE — ED PROVIDER NOTES
[facility]  ED  EMERGENCY DEPARTMENT ENCOUNTER      Pt Name: Skey Ferrer  MRN: [id number]  Armselanagfanushka 1935  Date of evaluation: 12/23/2019  Provider: Dat Hernandez MD    CHIEF COMPLAINT       Chief Complaint   Patient presents with    Shortness of Breath     per squad report pt c/o SOB, they report his initial sat was 87% on room air. 1 duoneb given in route to the ED. pt recently admitted for CHF          HISTORY OF PRESENT ILLNESS   (Location/Symptom, Timing/Onset, Context/Setting, Quality, Duration, Modifying Factors, Severity)  Note limiting factors. Skye Ferrer is a 82 y.o. male who presents to the emergency department     Patient is an 82-year-old male with a past medical history of hypertension, hyperlipidemia, coronary artery disease, CHF who presents with labored breathing found to be hypoxic. Patient was recently discharged from the hospital for CHF exacerbation a few weeks ago. Since being home he has been taking his medications as prescribed and had generally been feeling well until this afternoon. Caretaker at bedside states that she picked him up for his haircut appointment and noted that he seemed to be very labored in his breathing. She states that they got into the car and he leaned forward to take a deep breath in and seemed very out of it so she brought him to the emergency department for evaluation. Patient denies any fevers, chills, cough, chest pain, abdominal pain. Patient denies any lower extremity swelling. The history is provided by the patient. Nursing Notes were reviewed. REVIEW OF SYSTEMS    (2-9 systems for level 4, 10 or more for level 5)     Review of Systems   Constitutional: Negative for chills and fever. HENT: Negative for congestion and sore throat. Eyes: Negative for visual disturbance. Respiratory: Positive for shortness of breath. Negative for cough. Cardiovascular: Negative for chest pain and leg swelling.    Gastrointestinal: Negative for abdominal pain, constipation, diarrhea, nausea and vomiting. Genitourinary: Negative for dysuria and hematuria. Musculoskeletal: Negative for back pain and neck pain. Skin: Negative for pallor and rash. Neurological: Negative for light-headedness and headaches. All other systems reviewed and are negative. Except as noted above the remainder of the review of systems was reviewed and negative.        PAST MEDICAL HISTORY     Past Medical History:   Diagnosis Date    CAD (coronary artery disease) 8/4/2011    Cognitive impairment 8/4/2011    Essential hypertension, benign 8/4/2011    Hypothyroid 8/4/2011    PAD (peripheral artery disease) 8/4/2011    Pure hypercholesterolemia 8/4/2011         SURGICAL HISTORY       Past Surgical History:   Procedure Laterality Date    CORONARY ARTERY BYPASS GRAFT      KNEE SURGERY Left     knee replacement         CURRENT MEDICATIONS       Previous Medications    ASPIRIN EC 81 MG EC TABLET    Take 1 tablet by mouth daily    CARVEDILOL (COREG) 3.125 MG TABLET    TAKE 1 TABLET BY MOUTH TWICE DAILY WITH MEALS    CITALOPRAM (CELEXA) 10 MG TABLET    TAKE 1 TABLET BY MOUTH DAILY    DONEPEZIL (ARICEPT) 10 MG TABLET    TAKE 1 TABLET BY MOUTH EVERY NIGHT    FLUTICASONE (FLONASE) 50 MCG/ACT NASAL SPRAY    SHAKE LIQUID AND USE 1 SPRAY IN EACH NOSTRIL DAILY    FUROSEMIDE (LASIX) 20 MG TABLET    Take 1 tablet by mouth daily    HYDRALAZINE (APRESOLINE) 25 MG TABLET    Take 1 tablet by mouth 2 times daily    ISOSORBIDE MONONITRATE (IMDUR) 30 MG EXTENDED RELEASE TABLET    Take 1 tablet by mouth daily    LEVOTHYROXINE (SYNTHROID) 175 MCG TABLET    Take 1 tablet by mouth daily    LORATADINE (CLARITIN) 10 MG TABLET    Take 1 tablet by mouth daily    RANITIDINE (ZANTAC) 150 MG TABLET    TAKE 1 TABLET BY MOUTH TWICE DAILY    SENNA-DOCUSATE (PERICOLACE) 8.6-50 MG PER TABLET    TAKE 2 TABLETS BY MOUTH DAILY AS NEEDED FOR CONSTIPATION    SIMVASTATIN (ZOCOR) 40 MG TABLET TAKE 1 TABLET BY MOUTH EVERY DAY    TERAZOSIN (HYTRIN) 5 MG CAPSULE    TAKE 1 CAPSULE BY MOUTH EVERY NIGHT       ALLERGIES     Patient has no known allergies.     FAMILY HISTORY       Family History   Problem Relation Age of Onset    Hypertension Mother     Hypertension Father           SOCIAL HISTORY       Social History     Socioeconomic History    Marital status:      Spouse name: Not on file    Number of children: Not on file    Years of education: Not on file    Highest education level: Not on file   Occupational History    Not on file   Social Needs    Financial resource strain: Not on file    Food insecurity:     Worry: Not on file     Inability: Not on file    Transportation needs:     Medical: Not on file     Non-medical: Not on file   Tobacco Use    Smoking status: Former Smoker     Packs/day: 0.50     Years: 63.00     Pack years: 31.50     Types: Cigarettes    Smokeless tobacco: Never Used    Tobacco comment: 1 pack per week   Substance and Sexual Activity    Alcohol use: No    Drug use: No    Sexual activity: Not on file   Lifestyle    Physical activity:     Days per week: Not on file     Minutes per session: Not on file    Stress: Not on file   Relationships    Social connections:     Talks on phone: Not on file     Gets together: Not on file     Attends Restoration service: Not on file     Active member of club or organization: Not on file     Attends meetings of clubs or organizations: Not on file     Relationship status: Not on file    Intimate partner violence:     Fear of current or ex partner: Not on file     Emotionally abused: Not on file     Physically abused: Not on file     Forced sexual activity: Not on file   Other Topics Concern    Not on file   Social History Narrative    Not on file       SCREENINGS               PHYSICAL EXAM    (up to 7 for level 4, 8 or more for level 5)     ED Triage Vitals   BP Temp Temp src Pulse Resp SpO2 Height Weight   -- -- -- -- -- -- [address]  [place], [address] [email or web address]   Phone [phone]   COMPREHENSIVE METABOLIC PANEL - Abnormal; Notable for the following components:    Glucose 112 (*)     BUN 41 (*)     CREATININE 2.7 (*)     GFR Non- 23 (*)     GFR  27 (*)     Calcium 8.2 (*)     Total Protein 6.2 (*)     AST 12 (*)     All other components within normal limits    Narrative:     Performed at:  [address]  [place], [place] [email or web address]   Phone [phone] - Abnormal; Notable for the following components:    Pro-BNP >70,000 (*)     All other components within normal limits    Narrative:     Performed at:  [address]  [place], [address] [email or web address]   Phone [phone]   TROPONIN - Abnormal; Notable for the following components:    Troponin 0.27 (*)     All other components within normal limits    Narrative:     Performed at:  [address], [address] [email or web address]   Phone [phone]   LACTIC ACID, PLASMA    Narrative:     Performed at:  [address]  [place], [address] [email or web address]   Phone [phone]   URINALYSIS       All other labs were within normal range or not returned as of this dictation. EMERGENCY DEPARTMENT COURSE and DIFFERENTIAL DIAGNOSIS/MDM:   Vitals:    Vitals:    12/23/19 1137 12/23/19 1216 12/23/19 1244 12/23/19 1315   BP: (!) 146/82 128/86 138/86 (!) 149/88   Pulse: 82 74 80 80   Resp: 24 14 23 23   Temp: 97.9 °F (36.6 °C)      TempSrc: Oral      SpO2: (S) (!) 87% 97% 100% 100%   Weight:       Height:           Patient evaluated and previous record reviewed. Patient presents with labored breathing or shortness of breath found to be hypoxic requiring 2 L nasal cannula. Vital signs notable for afebrile, pulse 80.   Physical exam as documented above

## 2019-12-23 NOTE — ED NOTES
[email or web address]  Re: admit.  Heart failure exacerbation, on 2L NC per Saji Rooney returned call     Mcarthur Creeks Naegele  12/23/19 9761

## 2019-12-23 NOTE — PROGRESS NOTES
Advanced Care Planning Note. Purpose of Encounter: Advanced care planning in light of recurrent hospitalizations due to acute on chronic systolic CHF, ischemic cardiomyopathy, chronic kidney disease stage IV, severe mitral regurgitation (deemed high risk for surgery by cardiology) , while cognitive impairment  Parties in attendance:   Patient Lori Linear), Dr. Charlette San   (myself) , Sons x2 , [address] daughter   Decisional Capacity: Yes      Subjective/Patient Story: Patient/Family understands that his medical condition continues to deteriorate. He no longer wishes further curative interventions, including hospitalization, if there is no long term benefit : No   Patient/Family wishes to continue further interventions, patient will re-evaluate at a later time: No      Subjective and Objective Medical history :   Patient seen and examined  General appearance: Elderly CM in no apparent distress, appears stated age and cooperative. Oxygen by nasal cannula 2 L/min  Respiratory:  Normal respiratory effort. Fine crackles at bases. Diminished breath sounds at bases. Cardiovascular:  Regular rate and rhythm with normal S1/S2 with 3+ mid diastolic murmurs, no rubs or gallops. Abdomen: Soft, non-tender, non-distended with normal bowel sounds. Musculoskeletal:  No clubbing, cyanosis or edema bilaterally. Full range of motion without deformity. Skin: Skin color, texture, turgor normal.  No rashes or lesions. Neurologic:  Neurovascularly intact without any focal sensory/motor deficits. Cranial nerves: II-XII intact, grossly non-focal.     Goals of Care Determinations: Patient/Family wishes to focus on DNR/DNI      Plan:  As above     Code Status: At this time patient/Family  wishes to be Limited X 4      Time Spent on Advanced Planning Documents: 16 mins.   The following items were considered in medical decision making:  Independent review of images , Review /

## 2019-12-24 DIAGNOSIS — E03.9 HYPOTHYROIDISM, UNSPECIFIED TYPE: ICD-10-CM

## 2019-12-24 LAB
ANION GAP SERPL CALCULATED.3IONS-SCNC: 14 MMOL/L (ref 3–16)
BUN BLDV-MCNC: 42 MG/DL (ref 7–20)
CALCIUM SERPL-MCNC: 8 MG/DL (ref 8.3–10.6)
CHLORIDE BLD-SCNC: 101 MMOL/L (ref 99–110)
CHOLESTEROL, TOTAL: 126 MG/DL (ref 0–199)
CO2: 27 MMOL/L (ref 21–32)
CREAT SERPL-MCNC: 2.8 MG/DL (ref 0.8–1.3)
EKG ATRIAL RATE: 77 BPM
EKG DIAGNOSIS: NORMAL
EKG Q-T INTERVAL: 476 MS
EKG QRS DURATION: 152 MS
EKG QTC CALCULATION (BAZETT): 542 MS
EKG R AXIS: -5 DEGREES
EKG T AXIS: 186 DEGREES
EKG VENTRICULAR RATE: 78 BPM
GFR AFRICAN AMERICAN: 26
GFR NON-AFRICAN AMERICAN: 22
GLUCOSE BLD-MCNC: 148 MG/DL (ref 70–99)
HCT VFR BLD CALC: 29.2 % (ref 40.5–52.5)
HDLC SERPL-MCNC: 44 MG/DL (ref 40–60)
HEMOGLOBIN: 9.5 G/DL (ref 13.5–17.5)
LDL CHOLESTEROL CALCULATED: 64 MG/DL
MAGNESIUM: 2.3 MG/DL (ref 1.8–2.4)
MCH RBC QN AUTO: 31.3 PG (ref 26–34)
MCHC RBC AUTO-ENTMCNC: 32.6 G/DL (ref 31–36)
MCV RBC AUTO: 95.9 FL (ref 80–100)
PDW BLD-RTO: 14.2 % (ref 12.4–15.4)
PLATELET # BLD: 157 K/UL (ref 135–450)
PMV BLD AUTO: 8 FL (ref 5–10.5)
POTASSIUM SERPL-SCNC: 3.8 MMOL/L (ref 3.5–5.1)
RBC # BLD: 3.04 M/UL (ref 4.2–5.9)
SODIUM BLD-SCNC: 142 MMOL/L (ref 136–145)
TRIGL SERPL-MCNC: 92 MG/DL (ref 0–150)
VLDLC SERPL CALC-MCNC: 18 MG/DL
WBC # BLD: 6.4 K/UL (ref 4–11)

## 2019-12-24 PROCEDURE — 36415 COLL VENOUS BLD VENIPUNCTURE: CPT

## 2019-12-24 PROCEDURE — 97530 THERAPEUTIC ACTIVITIES: CPT

## 2019-12-24 PROCEDURE — 6360000002 HC RX W HCPCS: Performed by: INTERNAL MEDICINE

## 2019-12-24 PROCEDURE — 83735 ASSAY OF MAGNESIUM: CPT

## 2019-12-24 PROCEDURE — 97165 OT EVAL LOW COMPLEX 30 MIN: CPT

## 2019-12-24 PROCEDURE — 80061 LIPID PANEL: CPT

## 2019-12-24 PROCEDURE — 2580000003 HC RX 258: Performed by: INTERNAL MEDICINE

## 2019-12-24 PROCEDURE — 99222 1ST HOSP IP/OBS MODERATE 55: CPT | Performed by: INTERNAL MEDICINE

## 2019-12-24 PROCEDURE — 97161 PT EVAL LOW COMPLEX 20 MIN: CPT

## 2019-12-24 PROCEDURE — 2700000000 HC OXYGEN THERAPY PER DAY

## 2019-12-24 PROCEDURE — 93010 ELECTROCARDIOGRAM REPORT: CPT | Performed by: INTERNAL MEDICINE

## 2019-12-24 PROCEDURE — 94761 N-INVAS EAR/PLS OXIMETRY MLT: CPT

## 2019-12-24 PROCEDURE — 80048 BASIC METABOLIC PNL TOTAL CA: CPT

## 2019-12-24 PROCEDURE — 1200000000 HC SEMI PRIVATE

## 2019-12-24 PROCEDURE — 97110 THERAPEUTIC EXERCISES: CPT

## 2019-12-24 PROCEDURE — 85027 COMPLETE CBC AUTOMATED: CPT

## 2019-12-24 PROCEDURE — 6370000000 HC RX 637 (ALT 250 FOR IP): Performed by: INTERNAL MEDICINE

## 2019-12-24 RX ORDER — LEVOTHYROXINE SODIUM 175 UG/1
175 TABLET ORAL DAILY
Qty: 90 TABLET | Refills: 3 | Status: SHIPPED | OUTPATIENT
Start: 2019-12-24

## 2019-12-24 RX ADMIN — HEPARIN SODIUM 5000 UNITS: 5000 INJECTION INTRAVENOUS; SUBCUTANEOUS at 23:34

## 2019-12-24 RX ADMIN — HEPARIN SODIUM 5000 UNITS: 5000 INJECTION INTRAVENOUS; SUBCUTANEOUS at 05:55

## 2019-12-24 RX ADMIN — DOXAZOSIN 4 MG: 2 TABLET ORAL at 08:50

## 2019-12-24 RX ADMIN — CARVEDILOL 3.12 MG: 3.12 TABLET, FILM COATED ORAL at 08:50

## 2019-12-24 RX ADMIN — CITALOPRAM HYDROBROMIDE 10 MG: 20 TABLET ORAL at 08:51

## 2019-12-24 RX ADMIN — DONEPEZIL HYDROCHLORIDE 10 MG: 5 TABLET, FILM COATED ORAL at 19:37

## 2019-12-24 RX ADMIN — FUROSEMIDE 40 MG: 10 INJECTION, SOLUTION INTRAMUSCULAR; INTRAVENOUS at 17:59

## 2019-12-24 RX ADMIN — Medication 10 ML: at 08:51

## 2019-12-24 RX ADMIN — FLUTICASONE PROPIONATE 1 SPRAY: 50 SPRAY, METERED NASAL at 08:50

## 2019-12-24 RX ADMIN — HEPARIN SODIUM 5000 UNITS: 5000 INJECTION INTRAVENOUS; SUBCUTANEOUS at 14:40

## 2019-12-24 RX ADMIN — FUROSEMIDE 40 MG: 10 INJECTION, SOLUTION INTRAMUSCULAR; INTRAVENOUS at 08:50

## 2019-12-24 RX ADMIN — CARVEDILOL 3.12 MG: 3.12 TABLET, FILM COATED ORAL at 17:59

## 2019-12-24 RX ADMIN — Medication 10 ML: at 19:38

## 2019-12-24 RX ADMIN — ASPIRIN 81 MG: 81 TABLET, COATED ORAL at 08:50

## 2019-12-24 RX ADMIN — LEVOTHYROXINE SODIUM 175 MCG: 150 TABLET ORAL at 08:50

## 2019-12-24 ASSESSMENT — PAIN SCALES - GENERAL
PAINLEVEL_OUTOF10: 0
PAINLEVEL_OUTOF10: 0

## 2019-12-24 ASSESSMENT — PAIN DESCRIPTION - DIRECTION: RADIATING_TOWARDS: ;

## 2019-12-24 NOTE — PROGRESS NOTES
Patient is currently on 1L/NC. Weaned patients off of 1L/NC oxygen. Patient on RA at this time oxygen saturation at 93%. No complaint at this time.

## 2019-12-24 NOTE — PROGRESS NOTES
Fine crackles at bases. Diminished breath sounds at bases. Cardiovascular:  Regular rate and rhythm with normal S1/S2 with 3+ mid diastolic murmurs, no rubs or gallops. Abdomen: Soft, non-tender, non-distended with normal bowel sounds. Musculoskeletal:  No clubbing, cyanosis or edema bilaterally. Full range of motion without deformity. Skin: Skin color, texture, turgor normal.  No rashes or lesions. Neurologic:  Neurovascularly intact without any focal sensory/motor deficits. Cranial nerves: II-XII intact, grossly non-focal.  Psychiatric:  Alert and oriented, thought content appropriate, normal insight  Capillary Refill: Brisk,< 3 seconds   Peripheral Pulses: +2 palpable, equal bilaterally       Labs:   Recent Labs     12/23/19  1148 12/24/19  0931   WBC 6.1 6.4   HGB 9.9* 9.5*   HCT 30.0* 29.2*    157     Recent Labs     12/23/19  1148 12/24/19  0931    142   K 4.3 3.8    101   CO2 24 27   BUN 41* 42*   CREATININE 2.7* 2.8*   CALCIUM 8.2* 8.0*     Recent Labs     12/23/19  1148   AST 12*   ALT 10   BILITOT 0.5   ALKPHOS 72     No results for input(s): INR in the last 72 hours. Recent Labs     12/23/19  1148 12/23/19  1722 12/23/19  2002   TROPONINI 0.27* 0.16* 0.17*       Urinalysis:    Lab Results   Component Value Date    NITRU Negative 12/23/2019    WBCUA 0-2 12/23/2019    BACTERIA 2+ 11/28/2019    RBCUA 0-2 12/23/2019    BLOODU Negative 12/23/2019    SPECGRAV 1.020 12/23/2019    GLUCOSEU Negative 12/23/2019    GLUCOSEU Negative 12/19/2011       Radiology:  XR CHEST STANDARD (2 VW)   Final Result   Cardiomegaly and chronic pulmonary change with small bibasilar effusions and   adjacent bibasilar infiltrates.            Active Hospital Problems    Diagnosis Date Noted    Acute on chronic systolic CHF (congestive heart failure) ([facility] [address] [I50.23] 12/23/2019    Ischemic cardiomyopathy [I25.5] 12/01/2019    Nonrheumatic mitral valve regurgitation [I34.0] 12/01/2019    Acute on chronic nicotine patch but patient declined.     9.  Elevated troponin (POA) -initial troponin 0 0.27 - likely in the setting of CHF exacerbation and CKD . Not consistent with ACS as patient denies any chest pain/worsening symptoms. troponin down trended. Discussed with patient and son at bedside. Prefer limited code x4 for now . Agreeable to Palliative /Hospice care if needed     DVT Prophylaxis: Heparin subcu 5000 units 3 times daily  Diet: DIET LOW SODIUM 2 GM;  Code Status: Full Code    PT/OT Eval Status: Consulted    Dispo -likely 1 to 2 days pending clinical improvement. The note was completed using Dragon -speech recognition software & EMR  . Every effort was made to ensure accuracy; however, inadvertent computerized transcription errors may be present.     Monster Dodson MD

## 2019-12-24 NOTE — PROGRESS NOTES
Pt ambulated with RN and family members around unit on 2L oxygen. Pt tolerated ambulation well, denied SOB after ambulation however breathing sounded more labored during and after ambulation. O2 sats reading 95% when pt arrived back in room. Pt kept on 2L at this time for comfort. Will continue to monitor.

## 2019-12-24 NOTE — CARE COORDINATION
CASE MANAGEMENT INITIAL ASSESSMENT      Reviewed chart and met with patient today, re: assess any DCP needs  Explained Case Management role/services pt. Family present: none  Primary contact information: Martin Geller [phone]    Admit date/status: inpatient 12/23/2019  Diagnosis: Acute on chronic systolic congestive heart failure  Is this a Readmission?: none    Insurance: [employer] required for SNF - Y       3 night stay required - N    Living arrangements, Adls, care needs, prior to admission: Pt reports he lives in independent living at [facility]. Pt reported he uses his scooter to get around the facility because he is on the third floor. Pt is Independent and able to care for his ADLS in his apartment. Transportation: private    [facility] at home: Walker__Cane__RTS__ BSC__Shower Chair_x_  02__ HHN__ CPAP__  BiPap__  Hospital Bed__ W/C___ Other__________    Services in the home and/or outpatient, prior to admission: none    Dialysis Facility (if applicable) none  · Name:  · Address:  · Dialysis Schedule:  · Phone:  · Fax:    PT/OT recs: none noted    Hospital Exemption Notification (HEN): none started    Barriers to discharge: none    Plan/comments: Pt reported he plans to DC back to the [address] [place] [address]. He reported he has a very supportive family. Pt denied needs for DC.   Zulay López, CAMRYNW      ECOC on chart for MD signature

## 2019-12-24 NOTE — PROGRESS NOTES
Subjective  General  Chart Reviewed: Yes  Patient assessed for rehabilitation services?: Yes  Response To Previous Treatment: Not applicable  Family / Caregiver Present: No  Referring Practitioner: Allegra Solano MD  Referral Date : 12/24/19  Diagnosis: CHF  Follows Commands: Within Functional Limits  General Comment  Comments: cleared by nursing  Subjective  Subjective: Pt resting in bed. Denies pain   Pain Screening  Patient Currently in Pain: Denies          Orientation  Orientation  Overall Orientation Status: Impaired  Orientation Level: Disoriented to place; Disoriented to situation;Oriented to person  Social/Functional History  Social/Functional History  Lives With: Alone  Type of Home: ([facility])  Home Layout: One level  Home Access: Elevator, Level entry  Bathroom Shower/Tub: Walk-in shower  Bathroom Toilet: Standard  Bathroom Equipment: Built-in shower seat, Grab bars in shower, Grab bars around toilet  Home Equipment: Wheelchair-electric, Alert Button  ADL Assistance: [address] [facility]: (ind with laundry, [address] [place] [address] Rd takes care of cooking, cleaning)  Ambulation Assistance: Independent  Transfer Assistance: Independent  Active : No  Occupation: Retired  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  Cognition        Objective     Observation/Palpation  Posture: [address] (degrees)  RLE PROM: WFL  AROM RLE (degrees)  RLE AROM: WFL  PROM LLE (degrees)  LLE PROM: WFL  AROM LLE (degrees)  LLE AROM : WFL  Strength RLE  Strength RLE: WFL  Strength LLE  Strength LLE: WFL  Tone RLE  RLE Tone: Normotonic  Tone LLE  LLE Tone: Normotonic  Sensation  Overall Sensation Status: WFL  Bed mobility  Supine to Sit: Independent  Sit to Supine: Unable to assess  Transfers  Sit to Stand: Stand by assistance  Stand to sit: Stand by assistance  Ambulation  Ambulation?: Yes  More Ambulation?: No  Ambulation 1  Surface: level tile  Device: No Device  Assistance: Stand by assistance  Quality of Gait: flexed posture. No LOB  Gait Deviations: Slow Aylin;Decreased step length  Distance: 100'   Comments: decreased O2 to 87% on RA with activity   Stairs/Curb  Stairs?: No     Balance  Posture: Good  Sitting - Static: Good  Sitting - Dynamic: Good  Standing - Static: Good  Standing - Dynamic: [facility]  Times per week: 3-5x/week   Current Treatment Recommendations: Balance Training, Endurance Training, Transfer Training, Positioning, Home Exercise Program  Safety Devices  Type of devices:  All fall risk precautions in place, Call light within reach, Chair alarm in place, Gait belt, Patient at risk for falls, Nurse notified, Left in chair  Restraints  Initially in place: No      AM-PAC Score  AM-PAC Inpatient Mobility Raw Score : 21 (12/24/19 1445)  AM-PAC Inpatient T-Scale Score : 50.25 (12/24/19 1445)  Mobility Inpatient CMS 0-100% Score: 28.97 (12/24/19 1445)  Mobility Inpatient CMS G-Code Modifier : Aspen Lyons (12/24/19 1445)          Goals  Short term goals  Time Frame for Short term goals: 12/18  Short term goal 1: Pt will complete all transfers wtih Mod I  Short term goal 2: Pt will ambulate x 150' with LRAD with supervision  Short term goal 3: Pt will sit in the chair x 60 minutes without fatigue by12/26  Patient Goals   Patient goals : none expressed       Therapy Time   Individual Concurrent Group Co-treatment   Time In 1349         Time Out 1413         Minutes 24         Timed Code Treatment Minutes: [facility], PT

## 2019-12-24 NOTE — PROGRESS NOTES
Patient required 2L/NC of oxygen at the beginning of the shift saturation at 97%. Weaned oxygen down to 1L/NC saturation at 91% at this time.

## 2019-12-24 NOTE — CONSULTS
Cardiovascular Consultation     Attending Physician: Cori De Leon MD    PATIENT: Jossie Whalne  : 1935  MRN: [id number]    Reason for Consultation:   Chief Complaint   Patient presents with    Shortness of Breath     per squad report pt c/o SOB, they report his initial sat was 87% on room air. 1 duoneb given in route to the ED. pt recently admitted for CHF        History of present illness:   Mr. Jossie Whalen is a 82 y.o. male patient known to [address] [place] [address] for severe valvular disease, CMP with CHF. Discharged on  19 to SNF and returned to ER yesterday with worsening SOB. Denies chest pain, palpitations, lightheadedness, or syncope and is without PND, orthopnea, or LE edema. No family at bedside - reports daughter-in-law helping with medications and no concerns. Good IV diuresis here and working to get off oxygen- feels comfortable and with no complaints.      Medical History:      Diagnosis Date    CAD (coronary artery disease) 2011    Cognitive impairment 2011    Essential hypertension, benign 2011    Hypothyroid 2011    PAD (peripheral artery disease) 2011    Pure hypercholesterolemia 2011       Surgical History:      Procedure Laterality Date    CORONARY ARTERY BYPASS GRAFT      KNEE SURGERY Left     knee replacement       Social History:  Social History     Socioeconomic History    Marital status:      Spouse name: Not on file    Number of children: Not on file    Years of education: Not on file    Highest education level: Not on file   Occupational History    Not on file   Social Needs    Financial resource strain: Not on file    Food insecurity:     Worry: Not on file     Inability: Not on file    Transportation needs:     Medical: Not on file     Non-medical: Not on file   Tobacco Use    Smoking status: Former Smoker     Packs/day: 0.50     Years: 63.00     Pack years: 31.50     Types: Cigarettes    Wt 148 lb 9.6 oz (67.4 kg)   SpO2 92%   BMI 22.59 kg/m²   Wt Readings from Last 3 Encounters:   12/24/19 148 lb 9.6 oz (67.4 kg)   12/06/19 153 lb (69.4 kg)   12/01/19 146 lb 14.4 oz (66.6 kg)       GENERAL: Well developed, well nourished, no acute distress  NEUROLOGICAL: Alert and oriented x3  PSYCH: Normal mood and affect   SKIN: Warm and dry, without lesions  HEENT: Normocephalic, atraumatic, Sclera non-icteric, mucous membranes moist  NECK: supple, JVP normal, thyroid not enlarged   CAROTID: Normal upstroke, no bruits  CARDIAC: Normal PMI, regular rate and rhythm, normal S1S2, no murmur, rub  RESPIRATORY: Normal respiratory effort, clear to auscultation bilaterally  EXTREMITIES: No cyanosis, clubbing or edema, palpable pulses bilaterally   MUSCULOSKELETAL: No joint swelling or tenderness, no chest wall tenderness  GASTROINTESTINAL:  soft, non-tender, no bruit    Labs:  Lab Review   Lab Results   Component Value Date     12/24/2019    K 3.8 12/24/2019    K 4.3 12/01/2019     12/24/2019    CO2 27 12/24/2019    BUN 42 12/24/2019    CREATININE 2.8 12/24/2019    GLUCOSE 148 12/24/2019    GLUCOSE 90 03/15/2012    CALCIUM 8.0 12/24/2019     Lab Results   Component Value Date    CKTOTAL 288 04/10/2012    TROPONINI 0.17 12/23/2019     Lab Results   Component Value Date    WBC 6.4 12/24/2019    HGB 9.5 12/24/2019    HCT 29.2 12/24/2019    MCV 95.9 12/24/2019     12/24/2019     Lab Results   Component Value Date    CHOL 138 01/04/2019    TRIG 143 01/04/2019    HDL 35 01/04/2019         Imaging:  I have reviewed the below testing personally:  ECHO:   11/29/19      Summary   The left ventricular systolic function is moderately reduced with an   ejection fraction of 40-45 %. The left ventricle is mildly dilated. Mild concentric left ventricular hypertrophy. There is hypokinesis of the basal inferior and basal inferoseptal, apex and   all apical wall segments.    Left ventricular diastolic filling 156 -->148 lbs   Continue ASA, Coreg, hydralazine, Imdur, and statin   No ACE/ARB due to CKD and solitary kidney   Strict I&O, daily weight, fluid and sodium restriction   High surgical risk regarding potential Mi/Ao. OK to continue discussion for valvular heart disease and any potential for Bi- V pacemaker/Defibrillator as outpatient. OK to DC to SNF from cardiac perspective when off oxygen. Thank you for allowing me to participate in the care of your patient. Please do not hesitate to call. Agnieszka Moser D.O., [facility]  Interventional Cardiology     o: [phone]  [address] [place], [place] [address]., [address], [address]        NOTE:  This report was transcribed using voice recognition software. Every effort was made to ensure accuracy; however, inadvertent computerized transcription errors may be present.

## 2019-12-25 VITALS
SYSTOLIC BLOOD PRESSURE: 126 MMHG | TEMPERATURE: 97.6 F | WEIGHT: 143.7 LBS | OXYGEN SATURATION: 95 % | RESPIRATION RATE: 18 BRPM | HEIGHT: 68 IN | BODY MASS INDEX: 21.78 KG/M2 | DIASTOLIC BLOOD PRESSURE: 82 MMHG | HEART RATE: 69 BPM

## 2019-12-25 LAB
ANION GAP SERPL CALCULATED.3IONS-SCNC: 14 MMOL/L (ref 3–16)
BUN BLDV-MCNC: 44 MG/DL (ref 7–20)
CALCIUM SERPL-MCNC: 8.2 MG/DL (ref 8.3–10.6)
CHLORIDE BLD-SCNC: 100 MMOL/L (ref 99–110)
CO2: 29 MMOL/L (ref 21–32)
CREAT SERPL-MCNC: 2.9 MG/DL (ref 0.8–1.3)
GFR AFRICAN AMERICAN: 25
GFR NON-AFRICAN AMERICAN: 21
GLUCOSE BLD-MCNC: 108 MG/DL (ref 70–99)
MAGNESIUM: 2.2 MG/DL (ref 1.8–2.4)
POTASSIUM SERPL-SCNC: 3.6 MMOL/L (ref 3.5–5.1)
SODIUM BLD-SCNC: 143 MMOL/L (ref 136–145)

## 2019-12-25 PROCEDURE — 2580000003 HC RX 258: Performed by: INTERNAL MEDICINE

## 2019-12-25 PROCEDURE — 80048 BASIC METABOLIC PNL TOTAL CA: CPT

## 2019-12-25 PROCEDURE — 36415 COLL VENOUS BLD VENIPUNCTURE: CPT

## 2019-12-25 PROCEDURE — 83735 ASSAY OF MAGNESIUM: CPT

## 2019-12-25 PROCEDURE — 6360000002 HC RX W HCPCS: Performed by: INTERNAL MEDICINE

## 2019-12-25 PROCEDURE — 6370000000 HC RX 637 (ALT 250 FOR IP): Performed by: INTERNAL MEDICINE

## 2019-12-25 RX ORDER — FUROSEMIDE 20 MG/1
20 TABLET ORAL 2 TIMES DAILY
Qty: 60 TABLET | Refills: 1 | Status: ON HOLD | OUTPATIENT
Start: 2019-12-25 | End: 2020-01-30 | Stop reason: HOSPADM

## 2019-12-25 RX ORDER — FUROSEMIDE 20 MG/1
20 TABLET ORAL 2 TIMES DAILY
Status: DISCONTINUED | OUTPATIENT
Start: 2019-12-25 | End: 2019-12-25 | Stop reason: HOSPADM

## 2019-12-25 RX ADMIN — CARVEDILOL 3.12 MG: 3.12 TABLET, FILM COATED ORAL at 08:43

## 2019-12-25 RX ADMIN — HEPARIN SODIUM 5000 UNITS: 5000 INJECTION INTRAVENOUS; SUBCUTANEOUS at 14:28

## 2019-12-25 RX ADMIN — CITALOPRAM HYDROBROMIDE 10 MG: 20 TABLET ORAL at 08:43

## 2019-12-25 RX ADMIN — DOXAZOSIN 4 MG: 2 TABLET ORAL at 08:43

## 2019-12-25 RX ADMIN — FLUTICASONE PROPIONATE 1 SPRAY: 50 SPRAY, METERED NASAL at 08:44

## 2019-12-25 RX ADMIN — ASPIRIN 81 MG: 81 TABLET, COATED ORAL at 08:43

## 2019-12-25 RX ADMIN — Medication 10 ML: at 08:44

## 2019-12-25 RX ADMIN — FUROSEMIDE 40 MG: 10 INJECTION, SOLUTION INTRAMUSCULAR; INTRAVENOUS at 08:43

## 2019-12-25 RX ADMIN — HEPARIN SODIUM 5000 UNITS: 5000 INJECTION INTRAVENOUS; SUBCUTANEOUS at 05:01

## 2019-12-25 RX ADMIN — LEVOTHYROXINE SODIUM 175 MCG: 150 TABLET ORAL at 08:43

## 2019-12-25 ASSESSMENT — PAIN SCALES - GENERAL
PAINLEVEL_OUTOF10: 0

## 2019-12-25 NOTE — PROGRESS NOTES
Sent secure message to Dr. Radha Herrera via [employer] Pt removed tele monitor. Pt does not want back on. Can order to DC tele be placed to removed? Also Pt unable to get ahold of family. Pt asking if ok to call for cab to [employer]? Thank you    Spoke with Dr. Radha Herrera ok to remove tele monitor order placed and notified [address]. Also would prefer to get ahold of family or transport to [facility] and the [facility] pt does have some dementia.  Electronically signed by Pricilla Crow RN on 12/25/2019 at 2:33 PM

## 2019-12-25 NOTE — CARE COORDINATION
Writer aware of discharge orders. Son to transport back to [facility] per [employer]. Also denies needs for return to independent living.   Juan Miguel Jaquez RN

## 2019-12-25 NOTE — DISCHARGE INSTR - COC
Continuity of Care Form    Patient Name: Kirsten Mcelroy   :  1935  MRN:  [id number]    Admit date:  2019  Discharge date:  2019    Code Status Order: Limited   Advance Directives:   [facility] Documentation     Date/Time Healthcare Directive Type of Healthcare Directive Copy in [facility] [address] Agent's Name Healthcare Agent's Phone Number    19 1702  No, patient does not have an advance directive for healthcare treatment -- -- -- -- --          Admitting Physician:  Keyshawn Hill MD  PCP: Albert Negron MD    Discharging Nurse: Blair Hernandez [facility] Unit/Room#: 0203/0203-01  Discharging Unit Phone Number: [phone]    Emergency Contact:   Extended Emergency Contact Information  Primary Emergency Contact: [address] [place] [address] of [address] [place] [address] Phone: [phone]  Relation: Child  Secondary Emergency Contact: Nigel Moran  [place] Phone: [phone]  Relation: Other    Past Surgical History:  Past Surgical History:   Procedure Laterality Date    CORONARY ARTERY BYPASS GRAFT      KNEE SURGERY Left     knee replacement       Immunization History:   Immunization History   Administered Date(s) Administered    Influenza 2012, 10/29/2013    Influenza Virus Vaccine 2015    Influenza, High Dose (Fluzone 65 yrs and older) 10/25/2011, 2017, 2017, 2018    Pneumococcal Conjugate 13-valent ([id number]) 2016    Pneumococcal Conjugate 7-valent (Hurshel Holter) 10/27/2006       Active Problems:  Patient Active Problem List   Diagnosis Code    Essential hypertension, benign I10    Coronary artery disease involving native heart without angina pectoris I25.10    Pure hypercholesterolemia E78.00    PAD (peripheral artery disease) ([facility] [address] I73.9    Hypothyroid E03.9    Cognitive impairment R41.89    Constipation K59.00    BPH associated with nocturia N40.1, R35.1    Arthralgia of knee, right M25.561    Depressed F32.9    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.9    Murmur, cardiac R01.1    Tobacco abuse Z72.0    Bilateral low back pain with right-sided sciatica M54.41    NSTEMI (non-ST elevated myocardial infarction) ([facility]) I21.4    Acute on chronic systolic congestive heart failure ([facility]) I50.23    Ischemic cardiomyopathy I25.5    Left bundle branch block I44.7    Nonrheumatic mitral valve regurgitation I34.0    Acute on chronic systolic CHF (congestive heart failure) ([facility]) I50.23       Isolation/Infection:   Isolation          No Isolation        Patient Infection Status     None to display          Nurse Assessment:  Last Vital Signs: /82   Pulse 69   Temp 97.6 °F (36.4 °C) (Oral)   Resp 18   Ht 5' 8\" (1.727 m)   Wt 143 lb 11.2 oz (65.2 kg)   SpO2 95%   BMI 21.85 kg/m²     Last documented pain score (0-10 scale): Pain Level: 0  Last Weight:   Wt Readings from Last 1 Encounters:   12/25/19 143 lb 11.2 oz (65.2 kg)     Mental Status:  Alert, oriented    IV Access:  - None    Nursing Mobility/ADLs:  Walking   Assisted  Transfer  Assisted  Bathing  Assisted  Dressing  Independent  Toileting  Independent  Feeding  Independent  Med Admin  Independent  Med Delivery   whole    Wound Care Documentation and Therapy:        Elimination:  Continence:   · Bowel: Yes  · Bladder: Yes  Urinary Catheter: None   Colostomy/Ileostomy/Ileal Conduit: No       Date of Last BM: 12/23/2019    Intake/Output Summary (Last 24 hours) at 12/25/2019 1541  Last data filed at 12/25/2019 0852  Gross per 24 hour   Intake 720 ml   Output 1075 ml   Net -355 ml     I/O last 3 completed shifts: In: 5 [P.O.:720]  Out: 1075 [Urine:1075]    Safety Concerns:      At Risk for Falls    Impairments/Disabilities:      None    Nutrition Therapy:  Current Nutrition Therapy:   - Oral Diet:  Low Sodium (2gm)    Routes of Feeding: Oral  Liquids: No Restrictions  Daily

## 2019-12-25 NOTE — PROGRESS NOTES
Sent secure message to Dr Lakshmi Boyer via perfect serve Pt removed IV he thought he was being discharged today. Pt does get IV Lasix due again at 1800. Will pt be discharged today or do I need to reinsert IV?   He is ok per cardiology standpoint to DC  Thank you

## 2019-12-26 NOTE — DISCHARGE SUMMARY
Hospital Medicine Discharge Summary    Patient ID: Genoveva Tran      Patient's PCP: Mica Cruz MD    Admit Date: 12/23/2019     Discharge Date: 12/25/2019      Admitting Physician: Dinorah Christy MD     Discharge Physician: Tony Lloyd MD     Discharge Diagnoses: Active Hospital Problems    Diagnosis    Acute on chronic systolic CHF (congestive heart failure) (HCC) [I50.23]    Ischemic cardiomyopathy [I25.5]    Nonrheumatic mitral valve regurgitation [I34.0]    Acute on chronic systolic congestive heart failure (HCC) [I50.23]    BPH associated with nocturia [N40.1, R35.1]    Essential hypertension, benign [I10]    Hypothyroid [E03.9]    Pure hypercholesterolemia [E78.00]       The patient was seen and examined on day of discharge and this discharge summary is in conjunction with any daily progress note from day of discharge. Hospital Course:     82 y.o. male patient known to [address] [place] [address] for severe valvular disease, CMP with CHF. Discharged on  12/1/19 to SNF and returned to ER yesterday with worsening SOB. Denies chest pain, palpitations, lightheadedness, or syncope and is without PND, orthopnea, or LE edema. No family at bedside - reports daughter-in-law helping with medications and no concerns. Good IV diuresis here and working to get off oxygen- feels comfortable and with no complaints. 82 y.o. male with PMH of chronic systolic CHF (EF 40 to 30%) ; severe mitral regurgitation, hypertension, hyperlipidemia, chronic kidney disease stage III/IV who lives at [facility] presented to [facility] ED with c/o worsening shortness of breath. He was felt to have congestive heart failure acute exacerbation, he was seen by cardiology in consultation. He did receive IV diuretics during his admission. His condition improved fairly rapidly and he was up ambulating in his room and in the hallways without any need for any supplemental oxygen.   It was felt he was stable to be discharged

## 2019-12-30 ENCOUNTER — OFFICE VISIT (OUTPATIENT)
Dept: FAMILY MEDICINE CLINIC | Age: 84
End: 2019-12-30
Payer: MEDICARE

## 2019-12-30 VITALS
WEIGHT: 147 LBS | DIASTOLIC BLOOD PRESSURE: 62 MMHG | HEART RATE: 70 BPM | BODY MASS INDEX: 22.35 KG/M2 | OXYGEN SATURATION: 98 % | SYSTOLIC BLOOD PRESSURE: 130 MMHG

## 2019-12-30 DIAGNOSIS — I10 ESSENTIAL HYPERTENSION, BENIGN: ICD-10-CM

## 2019-12-30 DIAGNOSIS — N17.9 AKI (ACUTE KIDNEY INJURY) (HCC): ICD-10-CM

## 2019-12-30 DIAGNOSIS — D50.9 IRON DEFICIENCY ANEMIA, UNSPECIFIED IRON DEFICIENCY ANEMIA TYPE: Primary | ICD-10-CM

## 2019-12-30 DIAGNOSIS — E03.9 HYPOTHYROIDISM, UNSPECIFIED TYPE: ICD-10-CM

## 2019-12-30 DIAGNOSIS — I50.23 ACUTE ON CHRONIC SYSTOLIC CONGESTIVE HEART FAILURE (HCC): ICD-10-CM

## 2019-12-30 PROCEDURE — 99214 OFFICE O/P EST MOD 30 MIN: CPT | Performed by: FAMILY MEDICINE

## 2019-12-30 RX ORDER — LANOLIN ALCOHOL/MO/W.PET/CERES
325 CREAM (GRAM) TOPICAL
Qty: 30 TABLET | Refills: 3 | Status: ON HOLD | OUTPATIENT
Start: 2019-12-30 | End: 2020-06-01 | Stop reason: HOSPADM

## 2020-01-01 ENCOUNTER — TELEPHONE (OUTPATIENT)
Dept: FAMILY MEDICINE CLINIC | Age: 85
End: 2020-01-01

## 2020-01-06 ENCOUNTER — TELEPHONE (OUTPATIENT)
Dept: FAMILY MEDICINE CLINIC | Age: 85
End: 2020-01-06

## 2020-01-06 NOTE — TELEPHONE ENCOUNTER
Caller stated patient was admitted on 1/2/20 for medication management 2x per week for home care. Caller is requesting a return call to verify medication list.    Please call.

## 2020-01-19 ENCOUNTER — HOSPITAL ENCOUNTER (EMERGENCY)
Age: 85
Discharge: HOME | End: 2020-01-19
Attending: EMERGENCY MEDICINE
Payer: MEDICARE

## 2020-01-19 ENCOUNTER — APPOINTMENT (OUTPATIENT)
Dept: GENERAL RADIOLOGY | Age: 85
End: 2020-01-19
Payer: MEDICARE

## 2020-01-19 VITALS
RESPIRATION RATE: 16 BRPM | TEMPERATURE: 98.1 F | SYSTOLIC BLOOD PRESSURE: 154 MMHG | DIASTOLIC BLOOD PRESSURE: 85 MMHG | OXYGEN SATURATION: 95 % | HEART RATE: 83 BPM

## 2020-01-19 LAB
A/G RATIO: 1.5 (ref 1.1–2.2)
ALBUMIN SERPL-MCNC: 4.2 G/DL (ref 3.4–5)
ALP BLD-CCNC: 77 U/L (ref 40–129)
ALT SERPL-CCNC: 7 U/L (ref 10–40)
ANION GAP SERPL CALCULATED.3IONS-SCNC: 13 MMOL/L (ref 3–16)
AST SERPL-CCNC: 11 U/L (ref 15–37)
BASE EXCESS VENOUS: -0.4 MMOL/L (ref -3–3)
BASOPHILS ABSOLUTE: 0.1 K/UL (ref 0–0.2)
BASOPHILS RELATIVE PERCENT: 1.7 %
BILIRUB SERPL-MCNC: 0.7 MG/DL (ref 0–1)
BUN BLDV-MCNC: 29 MG/DL (ref 7–20)
CALCIUM SERPL-MCNC: 8.6 MG/DL (ref 8.3–10.6)
CARBOXYHEMOGLOBIN: 4.9 % (ref 0–1.5)
CHLORIDE BLD-SCNC: 102 MMOL/L (ref 99–110)
CO2: 27 MMOL/L (ref 21–32)
CREAT SERPL-MCNC: 2.2 MG/DL (ref 0.8–1.3)
EKG ATRIAL RATE: 75 BPM
EKG DIAGNOSIS: NORMAL
EKG P AXIS: 19 DEGREES
EKG P-R INTERVAL: 268 MS
EKG Q-T INTERVAL: 444 MS
EKG QRS DURATION: 156 MS
EKG QTC CALCULATION (BAZETT): 495 MS
EKG R AXIS: -10 DEGREES
EKG T AXIS: 182 DEGREES
EKG VENTRICULAR RATE: 75 BPM
EOSINOPHILS ABSOLUTE: 0.2 K/UL (ref 0–0.6)
EOSINOPHILS RELATIVE PERCENT: 3.4 %
GFR AFRICAN AMERICAN: 35
GFR NON-AFRICAN AMERICAN: 29
GLOBULIN: 2.8 G/DL
GLUCOSE BLD-MCNC: 95 MG/DL (ref 70–99)
HCO3 VENOUS: 24.4 MMOL/L (ref 23–29)
HCT VFR BLD CALC: 35.4 % (ref 40.5–52.5)
HEMOGLOBIN: 11.7 G/DL (ref 13.5–17.5)
INR BLD: 1.04 (ref 0.86–1.14)
LYMPHOCYTES ABSOLUTE: 1.8 K/UL (ref 1–5.1)
LYMPHOCYTES RELATIVE PERCENT: 27.5 %
MCH RBC QN AUTO: 31.2 PG (ref 26–34)
MCHC RBC AUTO-ENTMCNC: 33 G/DL (ref 31–36)
MCV RBC AUTO: 94.4 FL (ref 80–100)
METHEMOGLOBIN VENOUS: 0.7 %
MONOCYTES ABSOLUTE: 0.4 K/UL (ref 0–1.3)
MONOCYTES RELATIVE PERCENT: 6.8 %
NEUTROPHILS ABSOLUTE: 4 K/UL (ref 1.7–7.7)
NEUTROPHILS RELATIVE PERCENT: 60.6 %
O2 CONTENT, VEN: 12 VOL %
O2 SAT, VEN: 71 %
O2 THERAPY: ABNORMAL
PCO2, VEN: 40.6 MMHG (ref 40–50)
PDW BLD-RTO: 14.6 % (ref 12.4–15.4)
PH VENOUS: 7.4 (ref 7.35–7.45)
PLATELET # BLD: 175 K/UL (ref 135–450)
PMV BLD AUTO: 7.5 FL (ref 5–10.5)
PO2, VEN: 35.8 MMHG (ref 25–40)
POTASSIUM SERPL-SCNC: 3.8 MMOL/L (ref 3.5–5.1)
PRO-BNP: ABNORMAL PG/ML (ref 0–449)
PROTHROMBIN TIME: 12.1 SEC (ref 10–13.2)
RBC # BLD: 3.75 M/UL (ref 4.2–5.9)
SODIUM BLD-SCNC: 142 MMOL/L (ref 136–145)
TCO2 CALC VENOUS: 26 MMOL/L
TOTAL PROTEIN: 7 G/DL (ref 6.4–8.2)
TROPONIN: 0.05 NG/ML
TROPONIN: 0.06 NG/ML
WBC # BLD: 6.7 K/UL (ref 4–11)

## 2020-01-19 PROCEDURE — 80053 COMPREHEN METABOLIC PANEL: CPT

## 2020-01-19 PROCEDURE — 83880 ASSAY OF NATRIURETIC PEPTIDE: CPT

## 2020-01-19 PROCEDURE — 85025 COMPLETE CBC W/AUTO DIFF WBC: CPT

## 2020-01-19 PROCEDURE — 36415 COLL VENOUS BLD VENIPUNCTURE: CPT

## 2020-01-19 PROCEDURE — 71045 X-RAY EXAM CHEST 1 VIEW: CPT

## 2020-01-19 PROCEDURE — 99285 EMERGENCY DEPT VISIT HI MDM: CPT

## 2020-01-19 PROCEDURE — 96374 THER/PROPH/DIAG INJ IV PUSH: CPT

## 2020-01-19 PROCEDURE — 93005 ELECTROCARDIOGRAM TRACING: CPT | Performed by: EMERGENCY MEDICINE

## 2020-01-19 PROCEDURE — 82803 BLOOD GASES ANY COMBINATION: CPT

## 2020-01-19 PROCEDURE — 93010 ELECTROCARDIOGRAM REPORT: CPT | Performed by: INTERNAL MEDICINE

## 2020-01-19 PROCEDURE — 85610 PROTHROMBIN TIME: CPT

## 2020-01-19 PROCEDURE — 6360000002 HC RX W HCPCS: Performed by: EMERGENCY MEDICINE

## 2020-01-19 PROCEDURE — 84484 ASSAY OF TROPONIN QUANT: CPT

## 2020-01-19 RX ORDER — FUROSEMIDE 10 MG/ML
40 INJECTION INTRAMUSCULAR; INTRAVENOUS ONCE
Status: COMPLETED | OUTPATIENT
Start: 2020-01-19 | End: 2020-01-19

## 2020-01-19 RX ADMIN — FUROSEMIDE 40 MG: 10 INJECTION, SOLUTION INTRAMUSCULAR; INTRAVENOUS at 07:54

## 2020-01-19 ASSESSMENT — ENCOUNTER SYMPTOMS
SHORTNESS OF BREATH: 1
BACK PAIN: 0
NAUSEA: 0
PHOTOPHOBIA: 0
RHINORRHEA: 0
COUGH: 0
WHEEZING: 0
VOMITING: 0
DIARRHEA: 0
ABDOMINAL PAIN: 0

## 2020-01-19 NOTE — ED NOTES
5518 - called cardiology per consult  Re: mild CHF exac  0723 - Dr Paul Grier called back to speak with Dr Nikita Kellogg  01/19/20 3726

## 2020-01-19 NOTE — ED PROVIDER NOTES
Gastrointestinal: Negative for abdominal pain, diarrhea, nausea and vomiting. Genitourinary: Negative for dysuria and hematuria. Musculoskeletal: Negative for back pain and neck pain. Skin: Negative for rash and wound. Neurological: Negative for syncope and weakness. Psychiatric/Behavioral: Negative for agitation and confusion. Exam  ED Triage Vitals [01/19/20 0500]   BP Temp Temp Source Pulse Resp SpO2 Height Weight   (!) 144/91 98.1 °F (36.7 °C) Oral 78 20 97 % -- --       Physical Exam  Vitals signs and nursing note reviewed. Constitutional:       General: He is not in acute distress. Appearance: He is well-developed. HENT:      Head: Normocephalic and atraumatic. Eyes:      Extraocular Movements: Extraocular movements intact. Pupils: Pupils are equal, round, and reactive to light. Neck:      Musculoskeletal: Normal range of motion and neck supple. Cardiovascular:      Rate and Rhythm: Normal rate and regular rhythm. Heart sounds: No murmur. Comments: Equal radial pulses. Pulmonary:      Effort: Pulmonary effort is normal.      Breath sounds: Normal breath sounds. Comments: Decreased breath sounds at the bases bilaterally. Abdominal:      General: There is no distension. Palpations: Abdomen is soft. Tenderness: There is no tenderness. Musculoskeletal: Normal range of motion. General: No deformity. Skin:     General: Skin is warm. Findings: No rash. Neurological:      Mental Status: He is alert and oriented to person, place, and time. Motor: No abnormal muscle tone.    Psychiatric:         Behavior: Behavior normal.           ED Course    ED Medication Orders (From admission, onward)    Start Ordered     Status Ordering Provider    01/19/20 0630 01/19/20 0627  furosemide (LASIX) injection 40 mg  ONCE      Acknowledged GREGORY CHEUNG          EKG  Sinus rhythm with first-degree AV block, rate of 75, normal axis, left bundle (A) >60    GFR  35 (A) >60    Calcium 8.6 8.3 - 10.6 mg/dL    Total Protein 7.0 6.4 - 8.2 g/dL    Alb 4.2 3.4 - 5.0 g/dL    Albumin/Globulin Ratio 1.5 1.1 - 2.2    Total Bilirubin 0.7 0.0 - 1.0 mg/dL    Alkaline Phosphatase 77 40 - 129 U/L    ALT 7 (L) 10 - 40 U/L    AST 11 (L) 15 - 37 U/L    Globulin 2.8 g/dL   Blood Gas, Venous   Result Value Ref Range    pH, Bob 7.396 7.350 - 7.450    pCO2, Bob 40.6 40.0 - 50.0 mmHg    pO2, Bob 35.8 25.0 - 40.0 mmHg    HCO3, Venous 24.4 23.0 - 29.0 mmol/L    Base Excess, Bob -0.4 -3.0 - 3.0 mmol/L    O2 Sat, Bob 71 Not Established %    Carboxyhemoglobin 4.9 (H) 0.0 - 1.5 %    MetHgb, Bob 0.7 <1.5 %    TC02 (Calc), Bob 26 Not Established mmol/L    O2 Content, Bob 12 Not Established VOL %    O2 Therapy Unknown    Protime-INR   Result Value Ref Range    Protime 12.1 10.0 - 13.2 sec    INR 1.04 0.86 - 1.14   Brain Natriuretic Peptide   Result Value Ref Range    Pro-BNP 60,305 (H) 0 - 449 pg/mL   Troponin   Result Value Ref Range    Troponin 0.06 (H) <0.01 ng/mL     Bedside ultrasound  Bedside cardiac ultrasound was performed using the phased-array probe  -Not plethoric IVC  -Subxiphoid view shows no pericardial effusion  -Parasternal long shows increased EPSS, LVOT less than 3 cm subjectively  -Poor squeeze  -Poor views four-chamber.  -Mild to moderate B-lines consistent with mild pulmonary edema. MDM  63-year-old male with CHF, severe MR, ischemic cardiomyopathy, presents with progressive shortness of breath/orthopnea for the past 48 hours. On arrival well-appearing no acute distress, vital signs within normal limits, maintaining O2 sats on room air. He has slightly decreased breath sounds at the bases otherwise unremarkable and reassuring exam, no volume overload clinically. Bedside ultrasound shows mild pulmonary edema, poor squeeze, non-plethoric IVC. Chest x-ray shows small pleural effusions otherwise no overt pulmonary edema.   Labs show significantly may be inappropriate. If there are any questions or concerns please feel free to contact the dictating provider for clarification.      Feroz Mina MD  [address] MD  01/19/20 2966

## 2020-01-20 ENCOUNTER — TELEPHONE (OUTPATIENT)
Dept: OTHER | Facility: CLINIC | Age: 85
End: 2020-01-20

## 2020-01-20 NOTE — TELEPHONE ENCOUNTER
Writer contacted pt to notify him of ED f/u appt. Appt scheduled for Tuesday 1-28 @8:00am with Dr. Akiko Moeller (Cardiology). Spoke with Hallie(daughter in law).

## 2020-01-29 ENCOUNTER — TELEPHONE (OUTPATIENT)
Dept: FAMILY MEDICINE CLINIC | Age: 85
End: 2020-01-29

## 2020-01-29 ENCOUNTER — APPOINTMENT (OUTPATIENT)
Dept: GENERAL RADIOLOGY | Age: 85
DRG: 291 | End: 2020-01-29
Payer: MEDICARE

## 2020-01-29 ENCOUNTER — HOSPITAL ENCOUNTER (INPATIENT)
Age: 85
LOS: 1 days | Discharge: HOME | DRG: 291 | End: 2020-01-30
Attending: EMERGENCY MEDICINE | Admitting: INTERNAL MEDICINE
Payer: MEDICARE

## 2020-01-29 PROBLEM — I50.21 ACUTE SYSTOLIC (CONGESTIVE) HEART FAILURE (HCC): Status: ACTIVE | Noted: 2020-01-29

## 2020-01-29 LAB
A/G RATIO: 1.4 (ref 1.1–2.2)
ALBUMIN SERPL-MCNC: 3.5 G/DL (ref 3.4–5)
ALP BLD-CCNC: 66 U/L (ref 40–129)
ALT SERPL-CCNC: 9 U/L (ref 10–40)
ANION GAP SERPL CALCULATED.3IONS-SCNC: 13 MMOL/L (ref 3–16)
AST SERPL-CCNC: 12 U/L (ref 15–37)
BACTERIA: ABNORMAL /HPF
BASE EXCESS VENOUS: 2 MMOL/L (ref -3–3)
BASOPHILS ABSOLUTE: 0.1 K/UL (ref 0–0.2)
BASOPHILS RELATIVE PERCENT: 1.2 %
BILIRUB SERPL-MCNC: 0.5 MG/DL (ref 0–1)
BILIRUBIN URINE: NEGATIVE
BLOOD, URINE: NEGATIVE
BUN BLDV-MCNC: 37 MG/DL (ref 7–20)
CALCIUM SERPL-MCNC: 8 MG/DL (ref 8.3–10.6)
CARBOXYHEMOGLOBIN: 4.2 % (ref 0–1.5)
CASTS: ABNORMAL /LPF
CHLORIDE BLD-SCNC: 101 MMOL/L (ref 99–110)
CLARITY: CLEAR
CO2: 27 MMOL/L (ref 21–32)
COLOR: YELLOW
CREAT SERPL-MCNC: 2.7 MG/DL (ref 0.8–1.3)
EKG ATRIAL RATE: 625 BPM
EKG DIAGNOSIS: NORMAL
EKG Q-T INTERVAL: 510 MS
EKG QRS DURATION: 148 MS
EKG QTC CALCULATION (BAZETT): 534 MS
EKG R AXIS: -12 DEGREES
EKG T AXIS: 187 DEGREES
EKG VENTRICULAR RATE: 66 BPM
EOSINOPHILS ABSOLUTE: 0.1 K/UL (ref 0–0.6)
EOSINOPHILS RELATIVE PERCENT: 1.8 %
EPITHELIAL CELLS, UA: ABNORMAL /HPF
GFR AFRICAN AMERICAN: 27
GFR NON-AFRICAN AMERICAN: 23
GLOBULIN: 2.5 G/DL
GLUCOSE BLD-MCNC: 85 MG/DL (ref 70–99)
GLUCOSE URINE: NEGATIVE MG/DL
HCO3 VENOUS: 27.9 MMOL/L (ref 23–29)
HCT VFR BLD CALC: 30 % (ref 40.5–52.5)
HEMOGLOBIN: 9.7 G/DL (ref 13.5–17.5)
KETONES, URINE: NEGATIVE MG/DL
LEUKOCYTE ESTERASE, URINE: NEGATIVE
LYMPHOCYTES ABSOLUTE: 1.1 K/UL (ref 1–5.1)
LYMPHOCYTES RELATIVE PERCENT: 16.5 %
MCH RBC QN AUTO: 30.2 PG (ref 26–34)
MCHC RBC AUTO-ENTMCNC: 32.4 G/DL (ref 31–36)
MCV RBC AUTO: 93.3 FL (ref 80–100)
METHEMOGLOBIN VENOUS: 0.4 %
MICROSCOPIC EXAMINATION: YES
MONOCYTES ABSOLUTE: 0.5 K/UL (ref 0–1.3)
MONOCYTES RELATIVE PERCENT: 7.7 %
NEUTROPHILS ABSOLUTE: 4.6 K/UL (ref 1.7–7.7)
NEUTROPHILS RELATIVE PERCENT: 72.8 %
NITRITE, URINE: NEGATIVE
O2 CONTENT, VEN: 11 VOL %
O2 SAT, VEN: 75 %
O2 THERAPY: ABNORMAL
PCO2, VEN: 49.4 MMHG (ref 40–50)
PDW BLD-RTO: 14.9 % (ref 12.4–15.4)
PH UA: 5.5 (ref 5–8)
PH VENOUS: 7.37 (ref 7.35–7.45)
PLATELET # BLD: 125 K/UL (ref 135–450)
PMV BLD AUTO: 8 FL (ref 5–10.5)
PO2, VEN: 41.4 MMHG (ref 25–40)
POTASSIUM SERPL-SCNC: 4 MMOL/L (ref 3.5–5.1)
PRO-BNP: ABNORMAL PG/ML (ref 0–449)
PROCALCITONIN: 0.07 NG/ML (ref 0–0.15)
PROTEIN UA: ABNORMAL MG/DL
RBC # BLD: 3.22 M/UL (ref 4.2–5.9)
RBC UA: ABNORMAL /HPF (ref 0–2)
SODIUM BLD-SCNC: 141 MMOL/L (ref 136–145)
SPECIFIC GRAVITY UA: 1.02 (ref 1–1.03)
TCO2 CALC VENOUS: 29 MMOL/L
TOTAL PROTEIN: 6 G/DL (ref 6.4–8.2)
TROPONIN: 0.05 NG/ML
TROPONIN: 0.06 NG/ML
URINE TYPE: ABNORMAL
UROBILINOGEN, URINE: 0.2 E.U./DL
WBC # BLD: 6.4 K/UL (ref 4–11)
WBC UA: ABNORMAL /HPF (ref 0–5)

## 2020-01-29 PROCEDURE — 82803 BLOOD GASES ANY COMBINATION: CPT

## 2020-01-29 PROCEDURE — 6370000000 HC RX 637 (ALT 250 FOR IP): Performed by: INTERNAL MEDICINE

## 2020-01-29 PROCEDURE — 6360000002 HC RX W HCPCS: Performed by: EMERGENCY MEDICINE

## 2020-01-29 PROCEDURE — 71046 X-RAY EXAM CHEST 2 VIEWS: CPT

## 2020-01-29 PROCEDURE — 84484 ASSAY OF TROPONIN QUANT: CPT

## 2020-01-29 PROCEDURE — 96372 THER/PROPH/DIAG INJ SC/IM: CPT

## 2020-01-29 PROCEDURE — 84443 ASSAY THYROID STIM HORMONE: CPT

## 2020-01-29 PROCEDURE — 1200000000 HC SEMI PRIVATE

## 2020-01-29 PROCEDURE — 94640 AIRWAY INHALATION TREATMENT: CPT

## 2020-01-29 PROCEDURE — 96376 TX/PRO/DX INJ SAME DRUG ADON: CPT

## 2020-01-29 PROCEDURE — 83880 ASSAY OF NATRIURETIC PEPTIDE: CPT

## 2020-01-29 PROCEDURE — 83540 ASSAY OF IRON: CPT

## 2020-01-29 PROCEDURE — G0378 HOSPITAL OBSERVATION PER HR: HCPCS

## 2020-01-29 PROCEDURE — 80053 COMPREHEN METABOLIC PANEL: CPT

## 2020-01-29 PROCEDURE — 2580000003 HC RX 258: Performed by: INTERNAL MEDICINE

## 2020-01-29 PROCEDURE — 6360000002 HC RX W HCPCS: Performed by: INTERNAL MEDICINE

## 2020-01-29 PROCEDURE — 80061 LIPID PANEL: CPT

## 2020-01-29 PROCEDURE — 96374 THER/PROPH/DIAG INJ IV PUSH: CPT

## 2020-01-29 PROCEDURE — 93010 ELECTROCARDIOGRAM REPORT: CPT | Performed by: INTERNAL MEDICINE

## 2020-01-29 PROCEDURE — 94761 N-INVAS EAR/PLS OXIMETRY MLT: CPT

## 2020-01-29 PROCEDURE — 93005 ELECTROCARDIOGRAM TRACING: CPT

## 2020-01-29 PROCEDURE — 85025 COMPLETE CBC W/AUTO DIFF WBC: CPT

## 2020-01-29 PROCEDURE — 81001 URINALYSIS AUTO W/SCOPE: CPT

## 2020-01-29 PROCEDURE — 6370000000 HC RX 637 (ALT 250 FOR IP): Performed by: EMERGENCY MEDICINE

## 2020-01-29 PROCEDURE — 84145 PROCALCITONIN (PCT): CPT

## 2020-01-29 PROCEDURE — 83550 IRON BINDING TEST: CPT

## 2020-01-29 PROCEDURE — 99285 EMERGENCY DEPT VISIT HI MDM: CPT

## 2020-01-29 PROCEDURE — 36415 COLL VENOUS BLD VENIPUNCTURE: CPT

## 2020-01-29 RX ORDER — FAMOTIDINE 10 MG
10 TABLET ORAL DAILY
Status: DISCONTINUED | OUTPATIENT
Start: 2020-01-30 | End: 2020-01-30 | Stop reason: HOSPADM

## 2020-01-29 RX ORDER — ACETAMINOPHEN 325 MG/1
650 TABLET ORAL EVERY 4 HOURS PRN
Status: DISCONTINUED | OUTPATIENT
Start: 2020-01-29 | End: 2020-01-30 | Stop reason: HOSPADM

## 2020-01-29 RX ORDER — ISOSORBIDE MONONITRATE 30 MG/1
30 TABLET, EXTENDED RELEASE ORAL DAILY
Status: DISCONTINUED | OUTPATIENT
Start: 2020-01-30 | End: 2020-01-30 | Stop reason: HOSPADM

## 2020-01-29 RX ORDER — HYDRALAZINE HYDROCHLORIDE 25 MG/1
25 TABLET, FILM COATED ORAL 2 TIMES DAILY
Status: DISCONTINUED | OUTPATIENT
Start: 2020-01-29 | End: 2020-01-30 | Stop reason: HOSPADM

## 2020-01-29 RX ORDER — CITALOPRAM 20 MG/1
10 TABLET ORAL DAILY
Status: DISCONTINUED | OUTPATIENT
Start: 2020-01-30 | End: 2020-01-30 | Stop reason: HOSPADM

## 2020-01-29 RX ORDER — FUROSEMIDE 10 MG/ML
20 INJECTION INTRAMUSCULAR; INTRAVENOUS ONCE
Status: COMPLETED | OUTPATIENT
Start: 2020-01-29 | End: 2020-01-29

## 2020-01-29 RX ORDER — SODIUM CHLORIDE 0.9 % (FLUSH) 0.9 %
10 SYRINGE (ML) INJECTION EVERY 12 HOURS SCHEDULED
Status: DISCONTINUED | OUTPATIENT
Start: 2020-01-29 | End: 2020-01-30 | Stop reason: HOSPADM

## 2020-01-29 RX ORDER — ONDANSETRON 2 MG/ML
4 INJECTION INTRAMUSCULAR; INTRAVENOUS EVERY 6 HOURS PRN
Status: DISCONTINUED | OUTPATIENT
Start: 2020-01-29 | End: 2020-01-30 | Stop reason: HOSPADM

## 2020-01-29 RX ORDER — FUROSEMIDE 10 MG/ML
40 INJECTION INTRAMUSCULAR; INTRAVENOUS 2 TIMES DAILY
Status: DISCONTINUED | OUTPATIENT
Start: 2020-01-29 | End: 2020-01-30

## 2020-01-29 RX ORDER — ASPIRIN 81 MG/1
81 TABLET ORAL DAILY
Status: DISCONTINUED | OUTPATIENT
Start: 2020-01-30 | End: 2020-01-30 | Stop reason: HOSPADM

## 2020-01-29 RX ORDER — DONEPEZIL HYDROCHLORIDE 5 MG/1
10 TABLET, FILM COATED ORAL NIGHTLY
Status: DISCONTINUED | OUTPATIENT
Start: 2020-01-29 | End: 2020-01-30 | Stop reason: HOSPADM

## 2020-01-29 RX ORDER — LEVOTHYROXINE SODIUM 175 UG/1
1 TABLET ORAL DAILY
Status: DISCONTINUED | OUTPATIENT
Start: 2020-01-29 | End: 2020-01-29 | Stop reason: CLARIF

## 2020-01-29 RX ORDER — HEPARIN SODIUM 5000 [USP'U]/ML
5000 INJECTION, SOLUTION INTRAVENOUS; SUBCUTANEOUS EVERY 8 HOURS SCHEDULED
Status: DISCONTINUED | OUTPATIENT
Start: 2020-01-29 | End: 2020-01-30 | Stop reason: HOSPADM

## 2020-01-29 RX ORDER — FLUTICASONE PROPIONATE 50 MCG
1 SPRAY, SUSPENSION (ML) NASAL DAILY
Status: DISCONTINUED | OUTPATIENT
Start: 2020-01-30 | End: 2020-01-30 | Stop reason: HOSPADM

## 2020-01-29 RX ORDER — SIMVASTATIN 40 MG
40 TABLET ORAL DAILY
Status: DISCONTINUED | OUTPATIENT
Start: 2020-01-30 | End: 2020-01-30 | Stop reason: HOSPADM

## 2020-01-29 RX ORDER — CARVEDILOL 3.12 MG/1
3.12 TABLET ORAL 2 TIMES DAILY WITH MEALS
Status: DISCONTINUED | OUTPATIENT
Start: 2020-01-29 | End: 2020-01-30 | Stop reason: HOSPADM

## 2020-01-29 RX ORDER — NITROGLYCERIN 0.4 MG/1
0.4 TABLET SUBLINGUAL EVERY 5 MIN PRN
Status: DISCONTINUED | OUTPATIENT
Start: 2020-01-29 | End: 2020-01-30 | Stop reason: HOSPADM

## 2020-01-29 RX ORDER — IPRATROPIUM BROMIDE AND ALBUTEROL SULFATE 2.5; .5 MG/3ML; MG/3ML
1 SOLUTION RESPIRATORY (INHALATION) ONCE
Status: COMPLETED | OUTPATIENT
Start: 2020-01-29 | End: 2020-01-29

## 2020-01-29 RX ORDER — SODIUM CHLORIDE 0.9 % (FLUSH) 0.9 %
10 SYRINGE (ML) INJECTION PRN
Status: DISCONTINUED | OUTPATIENT
Start: 2020-01-29 | End: 2020-01-30 | Stop reason: HOSPADM

## 2020-01-29 RX ADMIN — CARVEDILOL 3.12 MG: 3.12 TABLET, FILM COATED ORAL at 21:10

## 2020-01-29 RX ADMIN — HYDRALAZINE HYDROCHLORIDE 25 MG: 25 TABLET, FILM COATED ORAL at 21:10

## 2020-01-29 RX ADMIN — HEPARIN SODIUM 5000 UNITS: 5000 INJECTION INTRAVENOUS; SUBCUTANEOUS at 21:10

## 2020-01-29 RX ADMIN — IPRATROPIUM BROMIDE AND ALBUTEROL SULFATE 1 AMPULE: .5; 3 SOLUTION RESPIRATORY (INHALATION) at 13:05

## 2020-01-29 RX ADMIN — Medication 10 ML: at 21:09

## 2020-01-29 RX ADMIN — FUROSEMIDE 20 MG: 10 INJECTION, SOLUTION INTRAMUSCULAR; INTRAVENOUS at 14:15

## 2020-01-29 RX ADMIN — DONEPEZIL HYDROCHLORIDE 10 MG: 5 TABLET, FILM COATED ORAL at 21:10

## 2020-01-29 RX ADMIN — FUROSEMIDE 40 MG: 10 INJECTION, SOLUTION INTRAMUSCULAR; INTRAVENOUS at 21:09

## 2020-01-29 NOTE — TELEPHONE ENCOUNTER
Patient's daughter called and states that her dad had left a message on her voice mail stating that he did not want to go out today with her. Patient's daughter states that she had no plans on taking him out. Patient told his daughter when she called him back that he has not been feeling well for the last couple of days. She states that he does not sound right and sounds like he is disorientated. She wanted to know if she could get him in today to see Dr. Pipo Funes. She said that he won't see any other provider. Please advise.

## 2020-01-29 NOTE — ED NOTES
Pt's daughter walked out and stated that pt told her he was having a hard time breathing. Tech and RN to room. Pt's O2 sat at 87% on room air. Pt was slouching in bed. Pt's pulled up in bed and sat up. Pt immediately at 97% on room air. ZION Ellison put pt on 2L of O2 via Nasal Canula. Dr Apollo Jackson notified.       Clifofrd Crooks  01/29/20 0349

## 2020-01-29 NOTE — H&P
Hospital Medicine History & Physical      PCP: Franklin Quintanilla MD    Date of Admission: 1/29/2020    Date of Service: Pt seen/examined on 01/29/20 and placed in observation. Chief Complaint:  chest pain       History Of Present Illness:       82 y.o. male who presented to [facility] with chest pain. Complained to daughter of chest pain when the daughter came to visit earlier today. Patient has dementia. Unable to elaborate on details. However, patient's daughter states that there have not been any recent complaints of chest pain and patient usually does not complain of anything at all. Because of concerns about an acute cardiac issue, patient's daughter called 46 and patient was brought to the emergency room  Very poor historian because of dementia  Known past history of coronary heart disease, though remote. Patient states that occasionally has shortness of breath, no other accompanying symptoms, nothing that makes the patient feel better or worse. Past Medical History:          Diagnosis Date    CAD (coronary artery disease) 8/4/2011    Cognitive impairment 8/4/2011    Essential hypertension, benign 8/4/2011    Hypothyroid 8/4/2011    PAD (peripheral artery disease) 8/4/2011    Pure hypercholesterolemia 8/4/2011       Past Surgical History:          Procedure Laterality Date    CORONARY ARTERY BYPASS GRAFT      KNEE SURGERY Left     knee replacement       Medications Prior to Admission:      Prior to Admission medications    Medication Sig Start Date End Date Taking?  Authorizing Provider   ferrous sulfate (FE TABS) 325 (65 Fe) MG EC tablet Take 1 tablet by mouth daily (with breakfast) 12/30/19   Franklin Quintanilla MD   furosemide (LASIX) 20 MG tablet Take 1 tablet by mouth 2 times daily 12/25/19   Althea Bobo MD   levothyroxine (SYNTHROID) 175 MCG tablet TAKE 1 TABLET BY MOUTH DAILY 12/24/19   Franklin Quintanilla MD   isosorbide mononitrate (IMDUR) 30 MG extended release tablet Take 1 tablet by mouth daily 12/2/19   Morro Conklin MD   hydrALAZINE (APRESOLINE) 25 MG tablet Take 1 tablet by mouth 2 times daily 12/1/19   Morro Conklin MD   simvastatin (ZOCOR) 40 MG tablet TAKE 1 TABLET BY MOUTH EVERY DAY 11/11/19   Kayden Leonard MD   citalopram (CELEXA) 10 MG tablet TAKE 1 TABLET BY MOUTH DAILY 10/31/19   Kayden Leonard MD   ranitidine (ZANTAC) 150 MG tablet TAKE 1 TABLET BY MOUTH TWICE DAILY 10/31/19   Kayden Leonard MD   terazosin (HYTRIN) 5 MG capsule TAKE 1 CAPSULE BY MOUTH EVERY NIGHT 10/1/19   Kayden Leonard MD   donepezil (ARICEPT) 10 MG tablet TAKE 1 TABLET BY MOUTH EVERY NIGHT 8/23/19   Kayden Leonard MD   carvedilol (COREG) 3.125 MG tablet TAKE 1 TABLET BY MOUTH TWICE DAILY WITH MEALS 5/6/19   Kayden Leonard MD   fluticasone (FLONASE) 50 MCG/ACT nasal spray SHAKE LIQUID AND USE 1 SPRAY IN EACH NOSTRIL DAILY 11/15/17   Aminah Christie APRN - CNP   senna-docusate (Ellouise Hu) 8.6-50 MG per tablet TAKE 2 TABLETS BY MOUTH DAILY AS NEEDED FOR CONSTIPATION 6/12/17   Kayden Leonard MD   loratadine (CLARITIN) 10 MG tablet Take 1 tablet by mouth daily 9/26/16   Aminah Christie APRN - CNP   aspirin EC 81 MG EC tablet Take 1 tablet by mouth daily 9/21/16   Leonetta Hatchet, MD       Allergies:  Patient has no known allergies. Social History:      The patient currently lives at home    TOBACCO:   reports that he has quit smoking. His smoking use included cigarettes. He has a 31.50 pack-year smoking history. He has never used smokeless tobacco.  ETOH:   reports no history of alcohol use. E-Cigarettes Vaping or Juuling     Questions Responses    E-Cigarette Use Never User    Start Date     Does device contain nicotine? Never    Quit Date     E-Cigarette Type             Family History:      Reviewed in detail and negative for DM, CAD, Cancer, CVA.  Positive as follows:        Problem Relation Age of Onset    Hypertension Mother     Hypertension Father        REVIEW OF SYSTEMS:

## 2020-01-29 NOTE — ED PROVIDER NOTES
[address] ENCOUNTER        Pt Name: Jacob Sen  MRN: [id number]  [place] 1935  Date of evaluation: 1/29/2020  Provider: Wilmar Degroot MD  PCP: Ana Mathew MD      CHIEF COMPLAINT       Chief Complaint   Patient presents with    Shortness of Breath     Patient SOB with CP, 324mg ASA administered in route, no nitro, patient states pain now gone, 95% on RA upon arrival, Hx of CHF       HISTORY OFPRESENT ILLNESS   (Location/Symptom, Timing/Onset, Context/Setting, Quality, Duration, Modifying Factors,Severity)  Note limiting factors. Jacob Sen is a 82 y.o. male presenting today due to concern for developing reported chest pain prior to arrival that resolved with aspirin before he got to the emergency department but increasing shortness of breath with cough. He has a history of COPD and CHF and a prior CABG. No recent evaluation for acute coronary syndrome. His family states he does not normally complain of chest pain and whenever she found him today this was abnormal for him. No reported fever. No headache or abdominal pain. No vomiting. He does have a history of dementia and therefore history by the patient is limited but his daughter is at the bedside who found him and states he was having chest pain earlier today. He only has a complaint of shortness of breath when I asked him at this time. REVIEW OF SYSTEMS    (2-9 systems for level 4, 10 or more for level 5)     Review of Systems   Unable to perform ROS: Dementia         Positives and Pertinent negatives as per HPI.       PASTMEDICAL HISTORY     Past Medical History:   Diagnosis Date    CAD (coronary artery disease) 8/4/2011    Cognitive impairment 8/4/2011    Essential hypertension, benign 8/4/2011    Hypothyroid 8/4/2011    PAD (peripheral artery disease) 8/4/2011    Pure hypercholesterolemia 8/4/2011         SURGICAL HISTORY       Past Surgical History:   Procedure Laterality Date EC tablet Take 1 tablet by mouth daily  Qty: 30 tablet, Refills: 3             ALLERGIES     Patient has no known allergies. FAMILY HISTORY       Family History   Problem Relation Age of Onset    Hypertension Mother     Hypertension Father           SOCIAL HISTORY       Social History     Socioeconomic History    Marital status:      Spouse name: None    Number of children: None    Years of education: None    Highest education level: None   Occupational History    None   Social Needs    Financial resource strain: None    Food insecurity:     Worry: None     Inability: None    Transportation needs:     Medical: None     Non-medical: None   Tobacco Use    Smoking status: Former Smoker     Packs/day: 0.50     Years: 63.00     Pack years: 31.50     Types: Cigarettes    Smokeless tobacco: Never Used    Tobacco comment: 1 pack per week   Substance and Sexual Activity    Alcohol use: No    Drug use: No    Sexual activity: None   Lifestyle    Physical activity:     Days per week: None     Minutes per session: None    Stress: None   Relationships    Social connections:     Talks on phone: None     Gets together: None     Attends [employer] service: None     Active member of club or organization: None     Attends meetings of clubs or organizations: None     Relationship status: None    Intimate partner violence:     Fear of current or ex partner: None     Emotionally abused: None     Physically abused: None     Forced sexual activity: None   Other Topics Concern    None   Social History Narrative    None       SCREENINGS    [place] Coma Scale  Eye Opening: Spontaneous  Best Verbal Response: Oriented  Best Motor Response: Obeys commands  [place] Coma Scale Score: 15           PHYSICAL EXAM    (up to 7 for level 4, 8 or more for level 5)     ED Triage Vitals   BP Temp Temp src Pulse Resp SpO2 Height Weight   -- -- -- -- -- -- -- --       Physical Exam  Vitals signs and nursing note reviewed. status is at baseline. Sensory: No sensory deficit. Motor: No weakness. Psychiatric:         Mood and Affect: Mood normal.         Behavior: Behavior is cooperative.              DIAGNOSTIC RESULTS   :    Labs Reviewed   CBC WITH AUTO DIFFERENTIAL - Abnormal; Notable for the following components:       Result Value    RBC 3.22 (*)     Hemoglobin 9.7 (*)     Hematocrit 30.0 (*)     Platelets 480 (*)     All other components within normal limits    Narrative:     Performed at:  [address],  [place], [place]   Phone [phone]   TROPONIN - Abnormal; Notable for the following components:    Troponin 0.05 (*)     All other components within normal limits    Narrative:     Performed at:  [address],  [place], [place]   Phone [phone]   COMPREHENSIVE METABOLIC PANEL - Abnormal; Notable for the following components:    BUN 37 (*)     CREATININE 2.7 (*)     GFR Non- 23 (*)     GFR  27 (*)     Calcium 8.0 (*)     Total Protein 6.0 (*)     ALT 9 (*)     AST 12 (*)     All other components within normal limits    Narrative:     Performed at:  [address],  [place], [place]   Phone [phone]   BLOOD GAS, VENOUS - Abnormal; Notable for the following components:    pO2, Bob 41.4 (*)     Carboxyhemoglobin 4.2 (*)     All other components within normal limits    Narrative:     Performed at:  [address],  [place], [place]   Phone [phone] - Abnormal; Notable for the following components:    Pro-BNP >70,000 (*)     All other components within normal limits    Narrative:     Performed at:  [address],  [place], [place]   Phone [phone]   URINALYSIS - Abnormal; Notable for the following components:    Protein, UA TRACE (*)     All other components within normal limits    Narrative:     Performed at:  [address], [address] [place]   Phone [phone]   MICROSCOPIC URINALYSIS - Abnormal; Notable for the following components:    Casts 10-20 Hyaline (*)     RBC, UA 3-5 (*)     Bacteria, UA 1+ (*)     All other components within normal limits    Narrative:     Performed at:  [address], [address] [employer]   Phone [phone]   TROPONIN - Abnormal; Notable for the following components:    Troponin 0.06 (*)     All other components within normal limits    Narrative:     Performed at:  [address], [address] [employer]   Phone [phone]   BASIC METABOLIC PANEL - Abnormal; Notable for the following components:    Glucose 105 (*)     BUN 41 (*)     CREATININE 2.9 (*)     GFR Non- 21 (*)     GFR  25 (*)     All other components within normal limits    Narrative:     Performed at:  [address], [facility] [place] [employer] [place]   Phone [phone]   MAGNESIUM - Abnormal; Notable for the following components:    Magnesium 2.50 (*)     All other components within normal limits    Narrative:     Performed at:  [facility]) [address], [address] [employer]   Phone [phone]   TROPONIN - Abnormal; Notable for the following components:    Troponin 0.05 (*)     All other components within normal limits    Narrative:     Performed at:  [address], [address] [employer]   Phone [phone]   TROPONIN - Abnormal; Notable for the following components:    Troponin 0.05 (*)     All other components within normal limits    Narrative:     Performed at:  [address] [place] [address] Laboratory  [address] [place] [address], [facility] [employer]   Phone [phone]   CBC WITH AUTO DIFFERENTIAL - Abnormal; Notable for the following components:    RBC 3.45 (*)     Hemoglobin 10.6 (*)     Hematocrit 32.2 (*)     All other components within normal limits    Narrative:     Performed at:  [address], Former[facility] [employer]   Phone [phone]   IRON AND TIBC - Abnormal; Notable for the following components:    Iron 44 (*)     Iron Saturation 17 (*)     All other components within normal limits    Narrative:     Performed at:  [facility]  [address], [facility] [place] [address]   Phone [phone]   PROCALCITONIN    Narrative:     Performed at:  [address], Former[facility] named [facility] [employer]   Phone [phone]   LIPID PANEL    Narrative:     Performed at:  [address] [facility]  [address], [place] [address]   Phone [phone]   TSH WITHOUT REFLEX    Narrative:     Performed at:  [facility]  [address], [facility] [place] [address]   Phone [phone]       All other labs were within normal range or not returned asof this dictation. EKG: All EKG's are interpreted by the Emergency Department Physician who either signs or Co-signs this chart in the absence of a cardiologist.    The Ekg interpreted by me shows  atrial fibrillation with a rate of 66  Axis is   Normal  QTc is  within an acceptable range  Intervals and Durations are unremarkable.       ST Segments: no acute change and nonspecific changes  No significant change from prior EKG dated - 1/19/20  No STEMI, old LBBB is still present           RADIOLOGY:   Non-plain film images such as CT, Ultrasound and MRI are read by the radiologist. Elsy Bars images are visualized and preliminarily interpreted by the  ED Provider with the

## 2020-01-29 NOTE — ED NOTES
Bed: 03  Expected date:   Expected time:   Means of arrival:   Comments:  nathan Wiley RN  01/29/20 4585

## 2020-01-29 NOTE — ED NOTES
While sitting in stretcher, pt's O2 sat is 93% and HR is 62. Ambulated pt a total of 100-125ft on room air to and from restroom. Pt walked without assistance with a steady gait. Pt's sat was 91-95% on room air with HR of 65-73. Pt tolerated well and had no complaints of sob. Pt back in stretcher at this time with no complaints. Provider notified.       Renetta Ghotra  01/29/20 2624

## 2020-01-29 NOTE — PROGRESS NOTES
4 Eyes Skin Assessment     The patient is being assess for  Admission    I agree that 2 RN's have performed a thorough Head to Toe Skin Assessment on the patient. ALL assessment sites listed below have been assessed. Areas assessed by both nurses: 2  [x]   Head, Face, and Ears   [x]   Shoulders, Back, and Chest  [x]   Arms, Elbows, and Hands   [x]   Coccyx, Sacrum, and Ischum  [x]   Legs, Feet, and Heels        Does the Patient have Skin Breakdown?   No         Frankie Prevention initiated:  No   Wound Care Orders initiated:  No      [facility] nurse consulted for Pressure Injury (Stage 3,4, Unstageable, DTI, NWPT, and Complex wounds):  No      Nurse 1 eSignature: Electronically signed by Rosa Maria Lemus RN on 1/29/20 at 6:58 PM    **SHARE this note so that the co-signing nurse is able to place an eSignature**    Nurse 2 eSignature: {Esignature:[id number]}

## 2020-01-30 ENCOUNTER — TELEPHONE (OUTPATIENT)
Dept: FAMILY MEDICINE CLINIC | Age: 85
End: 2020-01-30
Payer: MEDICARE

## 2020-01-30 VITALS
BODY MASS INDEX: 21.2 KG/M2 | SYSTOLIC BLOOD PRESSURE: 132 MMHG | RESPIRATION RATE: 16 BRPM | TEMPERATURE: 98 F | OXYGEN SATURATION: 97 % | HEIGHT: 69 IN | HEART RATE: 69 BPM | WEIGHT: 143.1 LBS | DIASTOLIC BLOOD PRESSURE: 74 MMHG

## 2020-01-30 LAB
ANION GAP SERPL CALCULATED.3IONS-SCNC: 14 MMOL/L (ref 3–16)
BASOPHILS ABSOLUTE: 0.1 K/UL (ref 0–0.2)
BASOPHILS RELATIVE PERCENT: 1.2 %
BUN BLDV-MCNC: 41 MG/DL (ref 7–20)
CALCIUM SERPL-MCNC: 8.5 MG/DL (ref 8.3–10.6)
CHLORIDE BLD-SCNC: 100 MMOL/L (ref 99–110)
CHOLESTEROL, TOTAL: 139 MG/DL (ref 0–199)
CO2: 27 MMOL/L (ref 21–32)
CREAT SERPL-MCNC: 2.9 MG/DL (ref 0.8–1.3)
EOSINOPHILS ABSOLUTE: 0.2 K/UL (ref 0–0.6)
EOSINOPHILS RELATIVE PERCENT: 2.9 %
GFR AFRICAN AMERICAN: 25
GFR NON-AFRICAN AMERICAN: 21
GLUCOSE BLD-MCNC: 105 MG/DL (ref 70–99)
HCT VFR BLD CALC: 32.2 % (ref 40.5–52.5)
HDLC SERPL-MCNC: 47 MG/DL (ref 40–60)
HEMOGLOBIN: 10.6 G/DL (ref 13.5–17.5)
IRON SATURATION: 17 % (ref 20–50)
IRON: 44 UG/DL (ref 59–158)
LDL CHOLESTEROL CALCULATED: 72 MG/DL
LYMPHOCYTES ABSOLUTE: 1.3 K/UL (ref 1–5.1)
LYMPHOCYTES RELATIVE PERCENT: 21.1 %
MAGNESIUM: 2.5 MG/DL (ref 1.8–2.4)
MCH RBC QN AUTO: 30.8 PG (ref 26–34)
MCHC RBC AUTO-ENTMCNC: 33 G/DL (ref 31–36)
MCV RBC AUTO: 93.4 FL (ref 80–100)
MONOCYTES ABSOLUTE: 0.4 K/UL (ref 0–1.3)
MONOCYTES RELATIVE PERCENT: 6.7 %
NEUTROPHILS ABSOLUTE: 4.1 K/UL (ref 1.7–7.7)
NEUTROPHILS RELATIVE PERCENT: 68.1 %
PDW BLD-RTO: 15 % (ref 12.4–15.4)
PLATELET # BLD: 145 K/UL (ref 135–450)
PMV BLD AUTO: 8 FL (ref 5–10.5)
POTASSIUM SERPL-SCNC: 3.9 MMOL/L (ref 3.5–5.1)
RBC # BLD: 3.45 M/UL (ref 4.2–5.9)
SODIUM BLD-SCNC: 141 MMOL/L (ref 136–145)
TOTAL IRON BINDING CAPACITY: 262 UG/DL (ref 260–445)
TRIGL SERPL-MCNC: 100 MG/DL (ref 0–150)
TSH SERPL DL<=0.05 MIU/L-ACNC: 1.3 UIU/ML (ref 0.27–4.2)
VLDLC SERPL CALC-MCNC: 20 MG/DL
WBC # BLD: 6.1 K/UL (ref 4–11)

## 2020-01-30 PROCEDURE — 99223 1ST HOSP IP/OBS HIGH 75: CPT | Performed by: INTERNAL MEDICINE

## 2020-01-30 PROCEDURE — 83735 ASSAY OF MAGNESIUM: CPT

## 2020-01-30 PROCEDURE — 2580000003 HC RX 258: Performed by: INTERNAL MEDICINE

## 2020-01-30 PROCEDURE — 80048 BASIC METABOLIC PNL TOTAL CA: CPT

## 2020-01-30 PROCEDURE — 85025 COMPLETE CBC W/AUTO DIFF WBC: CPT

## 2020-01-30 PROCEDURE — 96376 TX/PRO/DX INJ SAME DRUG ADON: CPT

## 2020-01-30 PROCEDURE — G0180 MD CERTIFICATION HHA PATIENT: HCPCS | Performed by: FAMILY MEDICINE

## 2020-01-30 PROCEDURE — 6370000000 HC RX 637 (ALT 250 FOR IP): Performed by: INTERNAL MEDICINE

## 2020-01-30 PROCEDURE — 6360000002 HC RX W HCPCS: Performed by: INTERNAL MEDICINE

## 2020-01-30 PROCEDURE — G0378 HOSPITAL OBSERVATION PER HR: HCPCS

## 2020-01-30 PROCEDURE — 96372 THER/PROPH/DIAG INJ SC/IM: CPT

## 2020-01-30 PROCEDURE — 36415 COLL VENOUS BLD VENIPUNCTURE: CPT

## 2020-01-30 RX ORDER — FUROSEMIDE 40 MG/1
40 TABLET ORAL 2 TIMES DAILY
Status: DISCONTINUED | OUTPATIENT
Start: 2020-01-30 | End: 2020-01-30 | Stop reason: HOSPADM

## 2020-01-30 RX ORDER — FUROSEMIDE 40 MG/1
40 TABLET ORAL 2 TIMES DAILY
Qty: 60 TABLET | Refills: 3 | Status: ON HOLD | OUTPATIENT
Start: 2020-01-30 | End: 2020-03-31 | Stop reason: HOSPADM

## 2020-01-30 RX ADMIN — HEPARIN SODIUM 5000 UNITS: 5000 INJECTION INTRAVENOUS; SUBCUTANEOUS at 08:18

## 2020-01-30 RX ADMIN — ISOSORBIDE MONONITRATE 30 MG: 30 TABLET, EXTENDED RELEASE ORAL at 10:46

## 2020-01-30 RX ADMIN — Medication 10 ML: at 10:47

## 2020-01-30 RX ADMIN — FAMOTIDINE 10 MG: 10 TABLET ORAL at 10:46

## 2020-01-30 RX ADMIN — CITALOPRAM HYDROBROMIDE 10 MG: 20 TABLET ORAL at 10:47

## 2020-01-30 RX ADMIN — ASPIRIN 81 MG: 81 TABLET, COATED ORAL at 10:47

## 2020-01-30 RX ADMIN — CARVEDILOL 3.12 MG: 3.12 TABLET, FILM COATED ORAL at 10:47

## 2020-01-30 RX ADMIN — LEVOTHYROXINE SODIUM 175 MCG: 150 TABLET ORAL at 08:19

## 2020-01-30 RX ADMIN — FUROSEMIDE 40 MG: 10 INJECTION, SOLUTION INTRAMUSCULAR; INTRAVENOUS at 10:47

## 2020-01-30 RX ADMIN — SIMVASTATIN 40 MG: 40 TABLET, FILM COATED ORAL at 10:46

## 2020-01-30 RX ADMIN — HYDRALAZINE HYDROCHLORIDE 25 MG: 25 TABLET, FILM COATED ORAL at 10:47

## 2020-01-30 ASSESSMENT — PAIN SCALES - GENERAL
PAINLEVEL_OUTOF10: 0

## 2020-01-30 NOTE — CARE COORDINATION
Writer spoke with pt's primary RN, pt is from [facility], at baseline and ready for discharge. RN is planning to call pt's family to ask if they can transport him back to The [address] [place] [address]. No discharge needs noted at this time.   NEERAJ Hewitt-RN

## 2020-01-30 NOTE — CONSULTS
[address] [facility] 1935    History:  Past Medical History:   Diagnosis Date    CAD (coronary artery disease) 8/4/2011    Cognitive impairment 8/4/2011    Essential hypertension, benign 8/4/2011    Hypothyroid 8/4/2011    PAD (peripheral artery disease) 8/4/2011    Pure hypercholesterolemia 8/4/2011       ECHO: 40% (medication compliance is questionable)    Discharge plans: [employer], [facility]? Family Present: none    Advanced Directives: patient does not have advance directives and declines assistance completing them at this time    Patient's stated goal of care: return to normalized routine      Patient's current functional capacity:  Slight limitation of physical activity. Comfortable at rest. Ordinary physical activity results in fatigue, palpitation, dyspnea. Pt resting in bed at this time on RA. Pt denies shortness of breath. Pt without lower extremity edema. Patient's weights and intake/output reviewed:         Last three weights hospital weights reviewed:    Patient Vitals for the past 96 hrs (Last 3 readings):   Weight   01/30/20 0430 143 lb 1.6 oz (64.9 kg)   01/29/20 1859 145 lb 1 oz (65.8 kg)   01/29/20 1216 155 lb (70.3 kg)       Patient provided with both written and verbal education on CHF signs/symptoms, causes, discharge medications, daily weights, low sodium diet, activity, and follow-up. HF zone self management written instructions provided/reviewed and advised to call MD when in \"yellow\" zone. Instructed them to call the doctor post discharge if they experiences increasing worsening shortness of breath, worsening chest pains, increased swelling from their baseline, worsening cough, or weight gain of >2- 3 lbs in a day/ 5 lb gain in a week. Also advised to call the doctor if they feels dizzy, increased fatigue, decreased or difficulty urinating.      Instructed on and emphasized importance of scheduled hospital follow-up appointment with Marian Phan MD on 02- at [facility]  education needs reinforcement. No additional questions at this time. Stated he will alert nurse with any questions. PATIENT/CAREGIVER TEACHING:    Level of patient/caregiver understanding able to:   [ X ] Verbalize understanding [ ] Demonstrate understanding [ ] Teach back   [ X ] Needs reinforcement [ ] Other:     Education Time: 15 minutes    Recommendations:   1. Encourage follow-up appointment compliance. Next appointment: 02-  2. Educate further on fluid restriction of <64oz during inpatient stay so they can understand how to measure intake at home. 3. Review sodium restrictions. Encouraged to not add table salt to their foods and avoid foods that are high in sodium. 4. Continue to educate on S/S. Stress the importance of calling the MD with the earliest signs of an acute exacerbation. 5. Emphasize daily weights - instructed to call the MD if the patient gains 3 lb in a day or 5 lb in a week. 6. Provided patient with CHF Resource Line for questions and concerns.      Esperanza Calzada HF BSN-RN  Heart Failure Navigator  [address] [place] [address]  [phone]

## 2020-01-30 NOTE — PLAN OF CARE
Problem: FLUID AND ELECTROLYTE IMBALANCE  Goal: Fluid and electrolyte balance are achieved/maintained  Outcome: Ongoing  Note:     Patient's EF (Ejection Fraction) is greater than 40%    Heart Failure Medications:  Diuretics[de-identified] Furosemide  ACE[de-identified] None  ARB[de-identified] None  ARNI[de-identified] None  Evidenced Based Beta Blocker[de-identified] Carvedilol- Coreg    Patient's weights and intake/output reviewed: Yes    Patient's Last Weight: 143 lbs obtained by standing scale. Difference of 2 lbs less than last documented weight. Intake/Output Summary (Last 24 hours) at 1/30/2020 0553  Last data filed at 1/30/2020 0525  Gross per 24 hour   Intake 240 ml   Output 1300 ml   Net -1060 ml       Comorbidities Reviewed Yes    Patient has a past medical history of CAD (coronary artery disease), Cognitive impairment, Essential hypertension, benign, Hypothyroid, PAD (peripheral artery disease), and Pure hypercholesterolemia. >>For CHF and Comorbidity documentation on Education Time and Topics, please see Education Tab    Patient stated Daily Functional Goal: To tolerate walks without getting SOB. Pt resting in bed at this time on room air. Pt denies shortness of breath. Pt without lower extremity edema.      Patient and/or Family's stated Goal of Care this Admission: reduce shortness of breath, increase activity tolerance, better understand heart failure and disease management, be more comfortable, and reduce lower extremity edema prior to discharge

## 2020-01-30 NOTE — CONSULTS
[facility] [employer]. He was discharged in the emergency room. His weight on presentation during this current hospital admission was 155 pounds however a follow-up weight done on the same day was 145 pounds and then today's weight is 143 pounds. Per previous admissions, dry weight appears to be around 148 pounds. Past Medical History:   has a past medical history of CAD (coronary artery disease), Cognitive impairment, Essential hypertension, benign, Hypothyroid, PAD (peripheral artery disease), and Pure hypercholesterolemia. Surgical History:   has a past surgical history that includes knee surgery (Left) and Coronary artery bypass graft. Social History:   reports that he has quit smoking. His smoking use included cigarettes. He has a 31.50 pack-year smoking history. He has never used smokeless tobacco. He reports that he does not drink alcohol or use drugs. Family History:  family history includes Hypertension in his father and mother. Home Medications:  Were reviewed and are listed in nursing record and/or below  Prior to Admission medications    Medication Sig Start Date End Date Taking?  Authorizing Provider   ferrous sulfate (FE TABS) 325 (65 Fe) MG EC tablet Take 1 tablet by mouth daily (with breakfast) 12/30/19   Cass Bower MD   furosemide (LASIX) 20 MG tablet Take 1 tablet by mouth 2 times daily 12/25/19   Anna Duval MD   levothyroxine (SYNTHROID) 175 MCG tablet TAKE 1 TABLET BY MOUTH DAILY 12/24/19   Cass Bower MD   isosorbide mononitrate (IMDUR) 30 MG extended release tablet Take 1 tablet by mouth daily 12/2/19   Ale Park MD   hydrALAZINE (APRESOLINE) 25 MG tablet Take 1 tablet by mouth 2 times daily 12/1/19   Ale Park MD   simvastatin (ZOCOR) 40 MG tablet TAKE 1 TABLET BY MOUTH EVERY DAY 11/11/19   Cass Bower MD   citalopram (CELEXA) 10 MG tablet TAKE 1 TABLET BY MOUTH DAILY 10/31/19   Cass Bower MD   ranitidine (ZANTAC) 150 MG tablet TAKE 1 TABLET BY Pulse: 80   Resp:    Temp: 97.5 °F (36.4 °C)   SpO2: 97%    Weight: 143 lb 1.6 oz (64.9 kg)         General Appearance:  Alert, cooperative, no distress, appears stated age, sitting up eating breakfast, alert and oriented x1   Head:  Normocephalic, without obvious abnormality, atraumatic   Eyes:  PERRL, conjunctiva/corneas clear   Nose: Nares normal, no drainage or sinus tenderness   Throat: Lips, mucosa, and tongue normal   Neck: Supple, symmetrical, trachea midline, no adenopathy, thyroid: not enlarged, symmetric, no tenderness/mass/nodules, no carotid bruit or JVD   Lungs:   Clear to auscultation bilaterally, respirations unlabored   Chest Wall:  No deformity or tenderness   Heart:  Regular rate and rhythm, S1, S2 moderately decreased 3 out of 6 systolic murmur   Abdomen:   Soft, non-tender, bowel sounds active all four quadrants,  no masses, no organomegaly   Extremities: Extremities normal, atraumatic, no cyanosis or edema   Pulses: 2+ and symmetric   Skin: Skin color, texture, turgor normal, no rashes or lesions   Pysch:  Flat/confused mood and affect   Neurologic: Normal gross motor and sensory exam.         Labs   CBC:   Lab Results   Component Value Date    WBC 6.1 01/30/2020    RBC 3.45 01/30/2020    HGB 10.6 01/30/2020    HCT 32.2 01/30/2020    MCV 93.4 01/30/2020    RDW 15.0 01/30/2020     01/30/2020     CMP:  Lab Results   Component Value Date     01/30/2020    K 3.9 01/30/2020    K 4.3 12/01/2019     01/30/2020    CO2 27 01/30/2020    BUN 41 01/30/2020    CREATININE 2.9 01/30/2020    GFRAA 25 01/30/2020    GFRAA 58 03/15/2012    AGRATIO 1.4 01/29/2020    LABGLOM 21 01/30/2020    GLUCOSE 105 01/30/2020    GLUCOSE 90 03/15/2012    PROT 6.0 01/29/2020    PROT 6.3 03/19/2013    CALCIUM 8.5 01/30/2020    BILITOT 0.5 01/29/2020    ALKPHOS 66 01/29/2020    AST 12 01/29/2020    ALT 9 01/29/2020     PT/INR:  No results found for: PTINR  HgBA1c:No results found for: LABA1C  Lab Results this can be reconsidered in the outpatient setting. No further inpatient evaluation or treatment is planned. Will sign off, please call with questions. Thank you for allowing us to participate in the care of Emily Sparrow. Please call me with any questions [phone].     Юлия Hamilton MD, [facility]   Interventional Cardiologist  [facility]  [phone] [facility]  [phone] [address] [place]  1/30/2020 11:26 AM

## 2020-01-31 ENCOUNTER — TELEPHONE (OUTPATIENT)
Dept: FAMILY MEDICINE CLINIC | Age: 85
End: 2020-01-31

## 2020-01-31 ENCOUNTER — FOLLOWUP TELEPHONE ENCOUNTER (OUTPATIENT)
Dept: ADMINISTRATIVE | Age: 85
End: 2020-01-31

## 2020-01-31 NOTE — TELEPHONE ENCOUNTER
3rd Attempt; HIPAA compliant VM message left with non urgent CHF nurse call back number as resource.

## 2020-01-31 NOTE — TELEPHONE ENCOUNTER
1st Attempt; HIPAA compliant VM message left with non urgent CHF nurse call back number as resource.

## 2020-02-03 ENCOUNTER — TELEPHONE (OUTPATIENT)
Dept: ADMINISTRATIVE | Age: 85
End: 2020-02-03

## 2020-02-03 NOTE — TELEPHONE ENCOUNTER
Laverne Ramos from [address] [place] [address] called wanting to let the provider know she will be sending orders to the office regarding skilled nursing home care orders, and is asking for the doctor to review and sign them if he feels it is necessary for the pt due to his most recent hospital visit.  Any questions feel free to contact Laverne Ramos at phone number [phone]

## 2020-02-05 NOTE — DISCHARGE SUMMARY
Hospital Medicine Discharge Summary    Patient ID: Jaylin Nash      Patient's PCP: Syed Sheikh MD    Admit Date: 1/29/2020     Discharge Date: 1/30/2020      Admitting Physician: Shilpa Silva MD     Discharge Physician: TREVOR Payan CNP     Discharge Diagnoses: Active Hospital Problems    Diagnosis    Acute systolic (congestive) heart failure (HCC) [I50.21]       The patient was seen and examined on day of discharge and this discharge summary is in conjunction with any daily progress note from day of discharge. Hospital Course:   82 y.o. male who presented to [facility] with chest pain. Complained to daughter of chest pain when the daughter came to visit earlier today. Patient has dementia. Unable to elaborate on details. However, patient's daughter states that there have not been any recent complaints of chest pain and patient usually does not complain of anything at all. Because of concerns about an acute cardiac issue, patient's daughter called 46 and patient was brought to the emergency room  Very poor historian because of dementia  Known past history of coronary heart disease, though remote. Patient states that occasionally has shortness of breath, no other accompanying symptoms, nothing that makes the patient feel better or worse.     Acute systolic congestive heart failure  Might explain patient's shortness of breath and possibly chest pain as well. We will switch the patient's diuretics to intravenous  Baseline left ventricular ejection fraction is 40 to 45%, 2 months ago. No need to repeat echo     Chest pain   Known history of coronary artery disease  Mildly elevated troponin, most likely secondary to demand ischemia  Check troponin, consult cardiology. I will defer stress test determination to cardiology.   \"Chest pain, shortness of breath, elevated troponin and proBNP, these are likely related to chronic heart disease, think the patient is currently compensated and can be switched to oral therapy for diuretics. Would continue other medications and suspect that he does have advanced heart disease. Due to advanced age and dementia, medical management seems best we will plan outpatient evaluation for heart failure as well as valvular disease which includes aortic stenosis and mitral regurgitation. I doubt he will be a good candidate for catheterization or surgery, this can be reconsidered in the outpatient setting. No further inpatient evaluation or treatment is planned. Will sign off, please call with questions. \"     Normochromic normocytic anemia  Consistent with baseline  Most likely secondary to chronic kidney injury  Continue to monitor, transfuse if needed     Hypothyroidism  Has been uncontrolled over past few years. No recent TSH level. Continue same Synthroid for now, pending today's TSH measurement.     Coronary artery disease  Continue aspirin statin and Plavix     Chronic kidney disease stage IV  Creatinine elevated, consistent with baseline. Monitor basic metabolic panel     Discussed with emergency room physician           Physical Exam Performed:     /74   Pulse 69   Temp 98 °F (36.7 °C) (Oral)   Resp 16   Ht 5' 9\" (1.753 m)   Wt 143 lb 1.6 oz (64.9 kg)   SpO2 97%   BMI 21.13 kg/m²       General appearance:  No apparent distress, appears stated age and cooperative. HEENT:  Normal cephalic, atraumatic without obvious deformity. Pupils equal, round, and reactive to light. Extra ocular muscles intact. Conjunctivae/corneas clear. Neck: Supple, with full range of motion. No jugular venous distention. Trachea midline. Respiratory:  Normal respiratory effort. Clear to auscultation, bilaterally without Rales/Wheezes/Rhonchi. Cardiovascular:  Regular rate and rhythm with normal S1/S2 without murmurs, rubs or gallops. Abdomen: Soft, non-tender, non-distended with normal bowel sounds.   Musculoskeletal:  No clubbing, cyanosis or edema

## 2020-02-10 ENCOUNTER — OFFICE VISIT (OUTPATIENT)
Dept: FAMILY MEDICINE CLINIC | Age: 85
End: 2020-02-10
Payer: MEDICARE

## 2020-02-10 VITALS
DIASTOLIC BLOOD PRESSURE: 42 MMHG | BODY MASS INDEX: 21.71 KG/M2 | HEART RATE: 53 BPM | OXYGEN SATURATION: 97 % | WEIGHT: 147 LBS | SYSTOLIC BLOOD PRESSURE: 120 MMHG

## 2020-02-10 PROCEDURE — 1111F DSCHRG MED/CURRENT MED MERGE: CPT | Performed by: FAMILY MEDICINE

## 2020-02-10 PROCEDURE — 99495 TRANSJ CARE MGMT MOD F2F 14D: CPT | Performed by: FAMILY MEDICINE

## 2020-02-10 RX ORDER — MEMANTINE HYDROCHLORIDE 5 MG/1
5 TABLET ORAL 2 TIMES DAILY
Qty: 60 TABLET | Refills: 5 | Status: SHIPPED | OUTPATIENT
Start: 2020-02-10 | End: 2020-03-09 | Stop reason: SDUPTHER

## 2020-02-10 RX ORDER — FLUOXETINE 10 MG/1
10 CAPSULE ORAL DAILY
Qty: 30 CAPSULE | Refills: 3 | Status: SHIPPED | OUTPATIENT
Start: 2020-02-10 | End: 2020-03-09 | Stop reason: SDUPTHER

## 2020-02-17 ENCOUNTER — TELEPHONE (OUTPATIENT)
Dept: FAMILY MEDICINE CLINIC | Age: 85
End: 2020-02-17

## 2020-02-21 ENCOUNTER — TELEPHONE (OUTPATIENT)
Dept: FAMILY MEDICINE CLINIC | Age: 85
End: 2020-02-21

## 2020-02-21 NOTE — TELEPHONE ENCOUNTER
Natalie from [facility] called requesting a current med list be faxed over please advise [phone]

## 2020-02-28 ENCOUNTER — TELEPHONE (OUTPATIENT)
Dept: FAMILY MEDICINE CLINIC | Age: 85
End: 2020-02-28
Payer: MEDICARE

## 2020-02-28 PROCEDURE — G0180 MD CERTIFICATION HHA PATIENT: HCPCS | Performed by: FAMILY MEDICINE

## 2020-02-28 NOTE — PROGRESS NOTES
Post-Discharge Transitional Care Management Services or Hospital Follow Up      Marcio Fletcher   YOB: 1935    Date of Office Visit:  2/10/2020  Date of Hospital Admission: 1/29/20  Date of Hospital Discharge: 1/30/20  Readmission Risk Score(high >=14%.  Medium >=10%):Readmission Risk Score: 21      Care management risk score Rising risk (score 2-5) and Complex Care (Scores >=6): 2     Non face to face  following discharge, date last encounter closed (first attempt may have been earlier): *No documented post hospital discharge outreach found in the last 14 days *No documented post hospital discharge outreach found in the last 14 days    Call initiated 2 business days of discharge: *No response recorded in the last 14 days     Patient Active Problem List   Diagnosis    Essential hypertension, benign    Coronary artery disease involving native heart without angina pectoris    Pure hypercholesterolemia    [facility]peripheral [facility]) [facility]    Hypothyroid    Cognitive impairment    Constipation    BPH associated with nocturia    Arthralgia of knee, right    Depressed    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    Fatigue    Contact dermatitis    Xeroderma    Murmur, cardiac    Tobacco abuse    Bilateral low back pain with right-sided sciatica    NSTEMI (non-ST elevated myocardial infarction) ([address]    Acute on chronic systolic congestive heart failure ([address]    Ischemic cardiomyopathy    Left bundle branch block    Nonrheumatic mitral valve regurgitation    Acute on chronic systolic CHF (congestive heart failure) (HCC)    Acute systolic (congestive) heart failure ([address]       No Known Allergies    Medications listed as ordered at the time of discharge from [facility] Medication Instructions AMANDA:    Printed on:02/28/20 5756   Medication Information                      aspirin EC 81 MG EC tablet  Take 1 tablet by mouth daily             carvedilol (COREG) 3.125 MG tablet  TAKE 1 TABLET BY MOUTH TWICE DAILY WITH MEALS             citalopram (CELEXA) 10 MG tablet  TAKE 1 TABLET BY MOUTH DAILY             donepezil (ARICEPT) 10 MG tablet  TAKE 1 TABLET BY MOUTH EVERY NIGHT             ferrous sulfate (FE TABS) 325 (65 Fe) MG EC tablet  Take 1 tablet by mouth daily (with breakfast)             FLUoxetine (PROZAC) 10 MG capsule  Take 1 capsule by mouth daily             fluticasone (FLONASE) 50 MCG/ACT nasal spray  SHAKE LIQUID AND USE 1 SPRAY IN EACH NOSTRIL DAILY             furosemide (LASIX) 40 MG tablet  Take 1 tablet by mouth 2 times daily             hydrALAZINE (APRESOLINE) 25 MG tablet  Take 1 tablet by mouth 2 times daily             isosorbide mononitrate (IMDUR) 30 MG extended release tablet  Take 1 tablet by mouth daily             levothyroxine (SYNTHROID) 175 MCG tablet  TAKE 1 TABLET BY MOUTH DAILY             loratadine (CLARITIN) 10 MG tablet  Take 1 tablet by mouth daily             memantine (NAMENDA) 5 MG tablet  Take 1 tablet by mouth 2 times daily             ranitidine (ZANTAC) 150 MG tablet  TAKE 1 TABLET BY MOUTH TWICE DAILY             senna-docusate (PERICOLACE) 8.6-50 MG per tablet  TAKE 2 TABLETS BY MOUTH DAILY AS NEEDED FOR CONSTIPATION             simvastatin (ZOCOR) 40 MG tablet  TAKE 1 TABLET BY MOUTH EVERY DAY                   Medications marked \"taking\" at this time  Outpatient Medications Marked as Taking for the 2/10/20 encounter (Office Visit) with Irvin Caruso MD   Medication Sig Dispense Refill    memantine (NAMENDA) 5 MG tablet Take 1 tablet by mouth 2 times daily 60 tablet 5    FLUoxetine (PROZAC) 10 MG capsule Take 1 capsule by mouth daily 30 capsule 3    furosemide (LASIX) 40 MG tablet Take 1 tablet by mouth 2 times daily 60 tablet 3    ferrous sulfate (FE TABS) 325 (65 Fe) MG EC tablet Take 1 tablet by mouth daily (with breakfast) 30 tablet 3    levothyroxine (SYNTHROID) 175 MCG tablet TAKE 1 TABLET BY MOUTH DAILY 90 tablet 3  isosorbide mononitrate (IMDUR) 30 MG extended release tablet Take 1 tablet by mouth daily 30 tablet 3    hydrALAZINE (APRESOLINE) 25 MG tablet Take 1 tablet by mouth 2 times daily 60 tablet 3    simvastatin (ZOCOR) 40 MG tablet TAKE 1 TABLET BY MOUTH EVERY DAY 90 tablet 3    citalopram (CELEXA) 10 MG tablet TAKE 1 TABLET BY MOUTH DAILY 90 tablet 3    ranitidine (ZANTAC) 150 MG tablet TAKE 1 TABLET BY MOUTH TWICE DAILY 180 tablet 3    donepezil (ARICEPT) 10 MG tablet TAKE 1 TABLET BY MOUTH EVERY NIGHT 90 tablet 3    carvedilol (COREG) 3.125 MG tablet TAKE 1 TABLET BY MOUTH TWICE DAILY WITH MEALS 180 tablet 5    fluticasone (FLONASE) 50 MCG/ACT nasal spray SHAKE LIQUID AND USE 1 SPRAY IN EACH NOSTRIL DAILY 1 Bottle 2    senna-docusate (PERICOLACE) 8.6-50 MG per tablet TAKE 2 TABLETS BY MOUTH DAILY AS NEEDED FOR CONSTIPATION 60 tablet 0    loratadine (CLARITIN) 10 MG tablet Take 1 tablet by mouth daily 30 tablet 5    aspirin EC 81 MG EC tablet Take 1 tablet by mouth daily 30 tablet 3        Medications patient taking as of now reconciled against medications ordered at time of hospital discharge: Yes    Chief Complaint   Patient presents with    Follow-Up from [facility]     1/29/20 [facility] heart failure        Marcio Fletcher is a 82 y.o. male. Patient presents with: Follow-Up from Hospital: 1/29/20 [facility] heart failure       Patient was admitted for acute CHF recently at [facility]. Was diuresed and is now improving. Was having chest pain, but did not have elevated cardiac enzymes. Has been stable at home, but has had increased fatigue, poor appetite and has been depressed. Notes that he has lost his will to live. Not feeling like life is very good right now. Denies shortness of breath or chest pain, but doesn't have any motivation to do anything. The patients PMH, surgical history, family history, medications, allergies were all reviewed and updated as appropriate today.         Inpatient course: Discharge summary

## 2020-03-09 ENCOUNTER — OFFICE VISIT (OUTPATIENT)
Dept: FAMILY MEDICINE CLINIC | Age: 85
End: 2020-03-09
Payer: MEDICARE

## 2020-03-09 VITALS
WEIGHT: 139 LBS | BODY MASS INDEX: 20.53 KG/M2 | OXYGEN SATURATION: 98 % | HEART RATE: 59 BPM | DIASTOLIC BLOOD PRESSURE: 60 MMHG | SYSTOLIC BLOOD PRESSURE: 116 MMHG

## 2020-03-09 PROCEDURE — 99214 OFFICE O/P EST MOD 30 MIN: CPT | Performed by: FAMILY MEDICINE

## 2020-03-09 RX ORDER — MEMANTINE HYDROCHLORIDE 10 MG/1
10 TABLET ORAL 2 TIMES DAILY
Qty: 60 TABLET | Refills: 3 | Status: ON HOLD | OUTPATIENT
Start: 2020-03-09 | End: 2020-06-01 | Stop reason: HOSPADM

## 2020-03-09 RX ORDER — FLUOXETINE HYDROCHLORIDE 20 MG/1
20 CAPSULE ORAL DAILY
Qty: 30 CAPSULE | Refills: 3 | Status: SHIPPED | OUTPATIENT
Start: 2020-03-09

## 2020-03-09 NOTE — PROGRESS NOTES
3/9/2020     Aliya Brooks (:  1935) is a 82 y.o. male, here for evaluation of the following medical concerns:    Aliya Brooks is a 82 y.o. male. Patient presents with:  1 Month Follow-Up      82-year-old male with dementia and depression who presents in follow-up. He was started on fluoxetine 10 mg daily and Namenda 5 mg twice a day. He is being seen back after month. He is still sleeping all the time. He admittedly does not want to live. He feels that there is nothing to live for. He states that everyone around him has . He has not had any happiness recently. He generally does not like where he is living. He does not like the people around him. He is generally unhappy. The patients PMH, surgical history, family history, medications, allergies were all reviewed and updated as appropriate today. Review of Systems    Prior to Visit Medications    Medication Sig Taking?  Authorizing Provider   memantine (NAMENDA) 10 MG tablet Take 1 tablet by mouth 2 times daily Yes Sandi Funes MD   FLUoxetine (PROZAC) 20 MG capsule Take 1 capsule by mouth daily Yes Sandi Funes MD   furosemide (LASIX) 40 MG tablet Take 1 tablet by mouth 2 times daily Yes TREVOR Navas CNP   ferrous sulfate (FE TABS) 325 (65 Fe) MG EC tablet Take 1 tablet by mouth daily (with breakfast) Yes Sandi Funes MD   levothyroxine (SYNTHROID) 175 MCG tablet TAKE 1 TABLET BY MOUTH DAILY Yes Sandi Funes MD   isosorbide mononitrate (IMDUR) 30 MG extended release tablet Take 1 tablet by mouth daily Yes Aureliano Pacheco MD   hydrALAZINE (APRESOLINE) 25 MG tablet Take 1 tablet by mouth 2 times daily Yes Aureliano Pacheco MD   simvastatin (ZOCOR) 40 MG tablet TAKE 1 TABLET BY MOUTH EVERY DAY Yes Sandi Funes MD   ranitidine (ZANTAC) 150 MG tablet TAKE 1 TABLET BY MOUTH TWICE DAILY Yes Sandi Funes MD   donepezil (ARICEPT) 10 MG tablet TAKE 1 TABLET BY MOUTH EVERY NIGHT Yes Sandi Funes MD   carvedilol

## 2020-03-26 ENCOUNTER — HOSPITAL ENCOUNTER (INPATIENT)
Age: 85
LOS: 5 days | Discharge: NURSING, REHAB OR LONG-TERM CARE | DRG: 392 | End: 2020-03-31
Attending: EMERGENCY MEDICINE | Admitting: INTERNAL MEDICINE
Payer: MEDICARE

## 2020-03-26 ENCOUNTER — APPOINTMENT (OUTPATIENT)
Dept: GENERAL RADIOLOGY | Age: 85
DRG: 392 | End: 2020-03-26
Payer: MEDICARE

## 2020-03-26 PROBLEM — R00.1 BRADYCARDIA: Status: ACTIVE | Noted: 2020-03-26

## 2020-03-26 LAB
A/G RATIO: 1.6 (ref 1.1–2.2)
ALBUMIN SERPL-MCNC: 3.9 G/DL (ref 3.4–5)
ALP BLD-CCNC: 60 U/L (ref 40–129)
ALT SERPL-CCNC: 6 U/L (ref 10–40)
ANION GAP SERPL CALCULATED.3IONS-SCNC: 17 MMOL/L (ref 3–16)
AST SERPL-CCNC: 12 U/L (ref 15–37)
BASOPHILS ABSOLUTE: 0.1 K/UL (ref 0–0.2)
BASOPHILS RELATIVE PERCENT: 1.4 %
BILIRUB SERPL-MCNC: 0.4 MG/DL (ref 0–1)
BILIRUBIN URINE: NEGATIVE
BLOOD, URINE: NEGATIVE
BUN BLDV-MCNC: 74 MG/DL (ref 7–20)
CALCIUM SERPL-MCNC: 8.5 MG/DL (ref 8.3–10.6)
CHLORIDE BLD-SCNC: 104 MMOL/L (ref 99–110)
CLARITY: CLEAR
CO2: 20 MMOL/L (ref 21–32)
COLOR: YELLOW
CREAT SERPL-MCNC: 4.9 MG/DL (ref 0.8–1.3)
EKG ATRIAL RATE: 53 BPM
EKG DIAGNOSIS: NORMAL
EKG P AXIS: 86 DEGREES
EKG P-R INTERVAL: 238 MS
EKG Q-T INTERVAL: 568 MS
EKG QRS DURATION: 158 MS
EKG QTC CALCULATION (BAZETT): 532 MS
EKG R AXIS: -6 DEGREES
EKG T AXIS: 127 DEGREES
EKG VENTRICULAR RATE: 53 BPM
EOSINOPHILS ABSOLUTE: 0.2 K/UL (ref 0–0.6)
EOSINOPHILS RELATIVE PERCENT: 3 %
GFR AFRICAN AMERICAN: 14
GFR NON-AFRICAN AMERICAN: 11
GLOBULIN: 2.4 G/DL
GLUCOSE BLD-MCNC: 85 MG/DL (ref 70–99)
GLUCOSE URINE: NEGATIVE MG/DL
HCT VFR BLD CALC: 34.6 % (ref 40.5–52.5)
HEMOGLOBIN: 11.4 G/DL (ref 13.5–17.5)
KETONES, URINE: NEGATIVE MG/DL
LACTIC ACID: 1.4 MMOL/L (ref 0.4–2)
LEUKOCYTE ESTERASE, URINE: NEGATIVE
LIPASE: 22 U/L (ref 13–60)
LYMPHOCYTES ABSOLUTE: 2.3 K/UL (ref 1–5.1)
LYMPHOCYTES RELATIVE PERCENT: 34.9 %
MAGNESIUM: 3 MG/DL (ref 1.8–2.4)
MCH RBC QN AUTO: 29.4 PG (ref 26–34)
MCHC RBC AUTO-ENTMCNC: 32.8 G/DL (ref 31–36)
MCV RBC AUTO: 89.6 FL (ref 80–100)
MICROSCOPIC EXAMINATION: NORMAL
MONOCYTES ABSOLUTE: 0.4 K/UL (ref 0–1.3)
MONOCYTES RELATIVE PERCENT: 5.9 %
NEUTROPHILS ABSOLUTE: 3.6 K/UL (ref 1.7–7.7)
NEUTROPHILS RELATIVE PERCENT: 54.8 %
NITRITE, URINE: NEGATIVE
PDW BLD-RTO: 15.8 % (ref 12.4–15.4)
PH UA: 5.5 (ref 5–8)
PLATELET # BLD: 93 K/UL (ref 135–450)
PLATELET SLIDE REVIEW: ABNORMAL
PMV BLD AUTO: 8.3 FL (ref 5–10.5)
POTASSIUM REFLEX MAGNESIUM: 4.6 MMOL/L (ref 3.5–5.1)
PRO-BNP: 9426 PG/ML (ref 0–449)
PROCALCITONIN: 0.09 NG/ML (ref 0–0.15)
PROTEIN UA: NEGATIVE MG/DL
RAPID INFLUENZA  B AGN: NEGATIVE
RAPID INFLUENZA A AGN: NEGATIVE
RBC # BLD: 3.87 M/UL (ref 4.2–5.9)
SLIDE REVIEW: ABNORMAL
SODIUM BLD-SCNC: 141 MMOL/L (ref 136–145)
SPECIFIC GRAVITY UA: 1.01 (ref 1–1.03)
TOTAL PROTEIN: 6.3 G/DL (ref 6.4–8.2)
TROPONIN: 0.08 NG/ML
URINE REFLEX TO CULTURE: NORMAL
URINE TYPE: NORMAL
UROBILINOGEN, URINE: 0.2 E.U./DL
WBC # BLD: 6.6 K/UL (ref 4–11)

## 2020-03-26 PROCEDURE — 83690 ASSAY OF LIPASE: CPT

## 2020-03-26 PROCEDURE — 87324 CLOSTRIDIUM AG IA: CPT

## 2020-03-26 PROCEDURE — 99285 EMERGENCY DEPT VISIT HI MDM: CPT

## 2020-03-26 PROCEDURE — 85025 COMPLETE CBC W/AUTO DIFF WBC: CPT

## 2020-03-26 PROCEDURE — 83605 ASSAY OF LACTIC ACID: CPT

## 2020-03-26 PROCEDURE — 84145 PROCALCITONIN (PCT): CPT

## 2020-03-26 PROCEDURE — 2580000003 HC RX 258: Performed by: INTERNAL MEDICINE

## 2020-03-26 PROCEDURE — 2060000000 HC ICU INTERMEDIATE R&B

## 2020-03-26 PROCEDURE — 83735 ASSAY OF MAGNESIUM: CPT

## 2020-03-26 PROCEDURE — 87804 INFLUENZA ASSAY W/OPTIC: CPT

## 2020-03-26 PROCEDURE — 81003 URINALYSIS AUTO W/O SCOPE: CPT

## 2020-03-26 PROCEDURE — 83880 ASSAY OF NATRIURETIC PEPTIDE: CPT

## 2020-03-26 PROCEDURE — 51702 INSERT TEMP BLADDER CATH: CPT

## 2020-03-26 PROCEDURE — 36415 COLL VENOUS BLD VENIPUNCTURE: CPT

## 2020-03-26 PROCEDURE — 93005 ELECTROCARDIOGRAM TRACING: CPT | Performed by: EMERGENCY MEDICINE

## 2020-03-26 PROCEDURE — 84484 ASSAY OF TROPONIN QUANT: CPT

## 2020-03-26 PROCEDURE — 87449 NOS EACH ORGANISM AG IA: CPT

## 2020-03-26 PROCEDURE — 6370000000 HC RX 637 (ALT 250 FOR IP): Performed by: INTERNAL MEDICINE

## 2020-03-26 PROCEDURE — 71045 X-RAY EXAM CHEST 1 VIEW: CPT

## 2020-03-26 PROCEDURE — 94761 N-INVAS EAR/PLS OXIMETRY MLT: CPT

## 2020-03-26 PROCEDURE — 6360000002 HC RX W HCPCS: Performed by: INTERNAL MEDICINE

## 2020-03-26 PROCEDURE — 2500000003 HC RX 250 WO HCPCS: Performed by: INTERNAL MEDICINE

## 2020-03-26 PROCEDURE — 93010 ELECTROCARDIOGRAM REPORT: CPT | Performed by: INTERNAL MEDICINE

## 2020-03-26 PROCEDURE — 80053 COMPREHEN METABOLIC PANEL: CPT

## 2020-03-26 PROCEDURE — 2580000003 HC RX 258: Performed by: EMERGENCY MEDICINE

## 2020-03-26 PROCEDURE — 2700000000 HC OXYGEN THERAPY PER DAY

## 2020-03-26 RX ORDER — ACETAMINOPHEN 650 MG/1
650 SUPPOSITORY RECTAL EVERY 6 HOURS PRN
Status: DISCONTINUED | OUTPATIENT
Start: 2020-03-26 | End: 2020-03-31 | Stop reason: HOSPADM

## 2020-03-26 RX ORDER — LEVOTHYROXINE SODIUM 175 UG/1
1 TABLET ORAL DAILY
Status: DISCONTINUED | OUTPATIENT
Start: 2020-03-26 | End: 2020-03-26 | Stop reason: CLARIF

## 2020-03-26 RX ORDER — ASPIRIN 81 MG/1
81 TABLET ORAL DAILY
Status: DISCONTINUED | OUTPATIENT
Start: 2020-03-26 | End: 2020-03-31 | Stop reason: HOSPADM

## 2020-03-26 RX ORDER — ATORVASTATIN CALCIUM 10 MG/1
20 TABLET, FILM COATED ORAL DAILY
Status: DISCONTINUED | OUTPATIENT
Start: 2020-03-27 | End: 2020-03-31 | Stop reason: HOSPADM

## 2020-03-26 RX ORDER — 0.9 % SODIUM CHLORIDE 0.9 %
1000 INTRAVENOUS SOLUTION INTRAVENOUS ONCE
Status: COMPLETED | OUTPATIENT
Start: 2020-03-26 | End: 2020-03-26

## 2020-03-26 RX ORDER — SODIUM CHLORIDE 0.9 % (FLUSH) 0.9 %
10 SYRINGE (ML) INJECTION EVERY 12 HOURS SCHEDULED
Status: DISCONTINUED | OUTPATIENT
Start: 2020-03-26 | End: 2020-03-31 | Stop reason: HOSPADM

## 2020-03-26 RX ORDER — ACETAMINOPHEN 325 MG/1
650 TABLET ORAL EVERY 6 HOURS PRN
Status: DISCONTINUED | OUTPATIENT
Start: 2020-03-26 | End: 2020-03-31 | Stop reason: HOSPADM

## 2020-03-26 RX ORDER — FLUOXETINE HYDROCHLORIDE 20 MG/1
20 CAPSULE ORAL DAILY
Status: DISCONTINUED | OUTPATIENT
Start: 2020-03-27 | End: 2020-03-31 | Stop reason: HOSPADM

## 2020-03-26 RX ORDER — HEPARIN SODIUM 5000 [USP'U]/ML
5000 INJECTION, SOLUTION INTRAVENOUS; SUBCUTANEOUS EVERY 8 HOURS SCHEDULED
Status: DISCONTINUED | OUTPATIENT
Start: 2020-03-26 | End: 2020-03-31 | Stop reason: HOSPADM

## 2020-03-26 RX ORDER — ACETAMINOPHEN 325 MG/1
650 TABLET ORAL ONCE
Status: DISCONTINUED | OUTPATIENT
Start: 2020-03-26 | End: 2020-03-26

## 2020-03-26 RX ORDER — SODIUM CHLORIDE 9 MG/ML
INJECTION, SOLUTION INTRAVENOUS CONTINUOUS
Status: DISCONTINUED | OUTPATIENT
Start: 2020-03-26 | End: 2020-03-26

## 2020-03-26 RX ORDER — SODIUM CHLORIDE 0.9 % (FLUSH) 0.9 %
10 SYRINGE (ML) INJECTION PRN
Status: DISCONTINUED | OUTPATIENT
Start: 2020-03-26 | End: 2020-03-31 | Stop reason: HOSPADM

## 2020-03-26 RX ORDER — PROMETHAZINE HYDROCHLORIDE 25 MG/1
12.5 TABLET ORAL EVERY 6 HOURS PRN
Status: DISCONTINUED | OUTPATIENT
Start: 2020-03-26 | End: 2020-03-31 | Stop reason: HOSPADM

## 2020-03-26 RX ORDER — ONDANSETRON 2 MG/ML
4 INJECTION INTRAMUSCULAR; INTRAVENOUS EVERY 6 HOURS PRN
Status: DISCONTINUED | OUTPATIENT
Start: 2020-03-26 | End: 2020-03-26

## 2020-03-26 RX ADMIN — SODIUM CHLORIDE: 9 INJECTION, SOLUTION INTRAVENOUS at 18:25

## 2020-03-26 RX ADMIN — ASPIRIN 81 MG: 81 TABLET, COATED ORAL at 18:25

## 2020-03-26 RX ADMIN — SODIUM CHLORIDE 1000 ML: 9 INJECTION, SOLUTION INTRAVENOUS at 13:50

## 2020-03-26 RX ADMIN — SODIUM BICARBONATE: 84 INJECTION, SOLUTION INTRAVENOUS at 20:13

## 2020-03-26 RX ADMIN — Medication 10 ML: at 20:13

## 2020-03-26 RX ADMIN — HEPARIN SODIUM 5000 UNITS: 5000 INJECTION INTRAVENOUS; SUBCUTANEOUS at 21:33

## 2020-03-26 ASSESSMENT — ENCOUNTER SYMPTOMS
COUGH: 0
DIARRHEA: 1
SORE THROAT: 0
SHORTNESS OF BREATH: 0
ABDOMINAL PAIN: 0
VOMITING: 0
NAUSEA: 1
BACK PAIN: 0

## 2020-03-26 ASSESSMENT — PAIN SCALES - GENERAL
PAINLEVEL_OUTOF10: 0
PAINLEVEL_OUTOF10: 0

## 2020-03-26 NOTE — ED PROVIDER NOTES
Patient was signed out to me at [phone] by Dr. Reji Yin to follow up on U/A results and to admit the patient. Briefly, the patient was at his living facility whenever he became hypotensive and bradycardic. EMS gave him atropine on route to the hospital and heart rate improved. He has a history of cognitive impairment and therefore history is limited and he does not remember anything from earlier today. He does not believe he fell or hit his head. He denies any chest pain or shortness of breath but does not normally wear oxygen and is currently on oxygen. He states he is still eating appropriately and denying any vomiting. He was discovered covered in diarrhea prior to arrival.  He states he believes he is still urinating okay although it feels like it is \"slower. \"    Physical:   Gen: No acute distress. AOx3. Psych: Normal mood and affect  HEENT: NCAT, PERRL, MMM  Neck: supple  Cardiac: bradycardia, regular rhythm, pulses 2+ in upper extremities, systolic murmur 3/6 on exam  Lungs: Bilateral breath sounds present, no wheezing or rhonchi, normal respiratory effort  Abdomen: soft and nontender with no R/D/G  Neuro: no focal neuro deficits with strength 5/5 in all 4 extremities    The Ekg interpreted by me shows  sinus bradycardia, rate=53  Axis is   Normal  QTc is  within an acceptable range  Intervals and Durations are unremarkable. ST Segments: no acute change and nonspecific changes  No significant change from prior EKG dated - 1/29/20  No STEMI, old LBBB is still present, 1st degree AV block is present           MDM: Patient was evaluated due to concern for acute on chronic kidney disease with hypotension and bradycardia. His heart rate and blood pressure were stable in the emergency department. Due to concern for worsening kidney function, he will need further evaluation in the hospital.  I spoke with Dr. Raymond Sal at [facility] for admission.   Tone was mildly elevated but it is normally elevated and this is most likely related to renal failure. This should be trended. No concern for NSTEMI at this time.        Eva Johnston MD  03/26/20 1894

## 2020-03-26 NOTE — ED TRIAGE NOTES
Medication reconciliation incomplete due to patient's inability to provide complete list.  Will need follow up. Partial Purse String (Simple) Text: Given the location of the defect and the characteristics of the surrounding skin a simple purse string closure was deemed most appropriate.  Undermining was performed circumfirentially around the surgical defect.  A purse string suture was then placed and tightened. Wound tension only allowed a partial closure of the circular defect.

## 2020-03-26 NOTE — ED NOTES
Obtained urine sample via sterile straightcath. Output of urine is 50mL and pt tolerated well.      Spenser James  03/26/20 1518

## 2020-03-26 NOTE — PLAN OF CARE
Problem: Falls - Risk of:  Goal: Will remain free from falls  Description: Will remain free from falls  Outcome: Ongoing  Patient instructed to use call light if assistance is needed. Call light within reach. Bed alarm on.

## 2020-03-26 NOTE — H&P
Hospital Medicine History & Physical      PCP: Leoncio Marina MD    Date of Admission: 3/26/2020    Date of Service: Pt seen/examined on 3/26/2020 and Admitted to Inpatient with expected LOS greater than two midnights due to medical therapy. Chief Complaint: Abnormal vitals      History Of Present Illness:  (    82 y.o. male who presented to [facility] with above complaint. History is from ER physician. [facility] and caretakers found that he has been having diarrhea and his blood pressure and heart rate were low for EMS. Patient has a history of dementia and poor historian   Patient was sent over here for further evaluation    Per ER phys  Demarco Solorio is a 82 y.o. male who presents to the emergency department      Patient is an 82-year-old male with a past medical history of hypertension, hyperlipidemia, coronary artery disease who presents with chills, diarrhea and found to be bradycardic. Patient reports that he feels chilled but denies any fever. Staff in his facility found him covered in diarrhea this morning. When EMS arrived they found his pulse to be in the 30s to 40s and his blood pressure to be 70/50. Patient was given atropine and seemed to respond however patient has maintained heart rate in the upper 50s with a blood pressure in the 120s since administration. Patient denies any abdominal pain.   He reports nausea but denies any vomiting.       Past Medical History:          Diagnosis Date    CAD (coronary artery disease) 8/4/2011    Cognitive impairment 8/4/2011    Essential hypertension, benign 8/4/2011    Hypothyroid 8/4/2011    PAD (peripheral artery disease) 8/4/2011    Pure hypercholesterolemia 8/4/2011       Past Surgical History:          Procedure Laterality Date    CORONARY ARTERY BYPASS GRAFT      KNEE SURGERY Left     knee replacement       Medications Prior to Admission:      Prior to Admission medications    Medication Sig Start Date End Recent Labs     03/26/20  1332   AST 12*   ALT 6*   BILITOT 0.4   ALKPHOS 60     No results for input(s): INR in the last 72 hours. Recent Labs     03/26/20  1332   TROPONINI 0.08*       Urinalysis:      Lab Results   Component Value Date    NITRU Negative 03/26/2020    WBCUA 0-2 01/29/2020    BACTERIA 1+ 01/29/2020    RBCUA 3-5 01/29/2020    BLOODU Negative 03/26/2020    SPECGRAV 1.015 03/26/2020    GLUCOSEU Negative 03/26/2020    GLUCOSEU Negative 12/19/2011       Radiology:     CXR: I have reviewed the CXR with the following interpretation:   EKG:  I have reviewed the EKG with the following interpretation: Sinus bradycardia with 1st degree A-V blockLeft bundle branch blockAbnormal ECGWhen compared with ECG of 29-JAN-2020 12:15,No significant change was foundConfirmed by Bernabe Olmedo MD, [place]     XR CHEST PORTABLE   Final Result   Small right-sided pleural effusion with right basilar atelectasis versus mild   early developing infiltrate.              ASSESSMENT:    Active Hospital Problems    Diagnosis Date Noted    Bradycardia [R00.1] 03/26/2020         PLAN:    Diarrhea-rule out infectious-admit, stool work-up, IV fluids, monitor BMP and electrolytes    Bradycardia-most probably secondary to Coreg-hold beta-blocker, telemetry, serial troponin, TSH and consider echocardiogram cardiology consult if necessary    Hypotension -most probably hypovolemic as well as secondary to bradycardia-I resuscitated with IV fluids and atropine, monitor, hold Coreg, Lasix hydralazine and Imdur, monitor closely    Essential hypertension-medication hold secondary to hypotension    Acute on chronic renal failure-possibly multifactorial, prerenal secondary to poor intake and GI losses as well as low blood pressure must have close worsening renal condition-hold antihypertensive, IV fluids, intake and output, ultrasound of the renal to rule out obstructive component, nephrology evaluation    Anion gap acidosis-most probably

## 2020-03-26 NOTE — ED PROVIDER NOTES
Genitourinary: Negative for dysuria and hematuria. Musculoskeletal: Negative for back pain and neck pain. Skin: Negative for pallor and rash. Neurological: Negative for light-headedness and headaches. All other systems reviewed and are negative. Except as noted above the remainder of the review of systems was reviewed and negative.        PAST MEDICAL HISTORY     Past Medical History:   Diagnosis Date    CAD (coronary artery disease) 8/4/2011    Cognitive impairment 8/4/2011    Essential hypertension, benign 8/4/2011    Hypothyroid 8/4/2011    PAD (peripheral artery disease) 8/4/2011    Pure hypercholesterolemia 8/4/2011         SURGICAL HISTORY       Past Surgical History:   Procedure Laterality Date    CORONARY ARTERY BYPASS GRAFT      KNEE SURGERY Left     knee replacement         CURRENT MEDICATIONS       Previous Medications    ASPIRIN EC 81 MG EC TABLET    Take 1 tablet by mouth daily    CARVEDILOL (COREG) 3.125 MG TABLET    TAKE 1 TABLET BY MOUTH TWICE DAILY WITH MEALS    DONEPEZIL (ARICEPT) 10 MG TABLET    TAKE 1 TABLET BY MOUTH EVERY NIGHT    FERROUS SULFATE (FE TABS) 325 (65 FE) MG EC TABLET    Take 1 tablet by mouth daily (with breakfast)    FLUOXETINE (PROZAC) 20 MG CAPSULE    Take 1 capsule by mouth daily    FLUTICASONE (FLONASE) 50 MCG/ACT NASAL SPRAY    SHAKE LIQUID AND USE 1 SPRAY IN EACH NOSTRIL DAILY    FUROSEMIDE (LASIX) 40 MG TABLET    Take 1 tablet by mouth 2 times daily    HYDRALAZINE (APRESOLINE) 25 MG TABLET    TAKE 1 TABLET BY MOUTH TWICE DAILY    ISOSORBIDE MONONITRATE (IMDUR) 30 MG EXTENDED RELEASE TABLET    TAKE 1 TABLET BY MOUTH EVERY DAY    LEVOTHYROXINE (SYNTHROID) 175 MCG TABLET    TAKE 1 TABLET BY MOUTH DAILY    LORATADINE (CLARITIN) 10 MG TABLET    Take 1 tablet by mouth daily    MEMANTINE (NAMENDA) 10 MG TABLET    Take 1 tablet by mouth 2 times daily    RANITIDINE (ZANTAC) 150 MG TABLET    TAKE 1 TABLET BY MOUTH TWICE DAILY    SENNA-DOCUSATE (PERICOLACE) 8.6-50 MG PER TABLET    TAKE 2 TABLETS BY MOUTH DAILY AS NEEDED FOR CONSTIPATION    SIMVASTATIN (ZOCOR) 40 MG TABLET    TAKE 1 TABLET BY MOUTH EVERY DAY       ALLERGIES     Patient has no known allergies. FAMILY HISTORY       Family History   Problem Relation Age of Onset    Hypertension Mother     Hypertension Father           SOCIAL HISTORY       Social History     Socioeconomic History    Marital status:      Spouse name: None    Number of children: None    Years of education: None    Highest education level: None   Occupational History    None   Social Needs    Financial resource strain: None    Food insecurity     Worry: None     Inability: None    Transportation needs     Medical: None     Non-medical: None   Tobacco Use    Smoking status: Former Smoker     Packs/day: 0.50     Years: 63.00     Pack years: 31.50     Types: Cigarettes    Smokeless tobacco: Never Used    Tobacco comment: 1 pack per week   Substance and Sexual Activity    Alcohol use: No    Drug use: No    Sexual activity: None   Lifestyle    Physical activity     Days per week: None     Minutes per session: None    Stress: None   Relationships    Social connections     Talks on phone: None     Gets together: None     Attends Anabaptism service: None     Active member of club or organization: None     Attends meetings of clubs or organizations: None     Relationship status: None    Intimate partner violence     Fear of current or ex partner: None     Emotionally abused: None     Physically abused: None     Forced sexual activity: None   Other Topics Concern    None   Social History Narrative    None       SCREENINGS               PHYSICAL EXAM    (up to 7 for level 4, 8 or more for level 5)     ED Triage Vitals [03/26/20 1316]   BP Temp Temp Source Pulse Resp SpO2 Height Weight   (!) 123/56 98.2 °F (36.8 °C) Oral 56 16 100 % 5' 9\" (1.753 m) 148 lb (67.1 kg)       Physical Exam  Vitals signs and nursing note reviewed. [address] [facility], [address] [place]   Phone [phone]   COMPREHENSIVE METABOLIC PANEL W/ REFLEX TO MG FOR LOW K - Abnormal; Notable for the following components:    CO2 20 (*)     Anion Gap 17 (*)     BUN 74 (*)     CREATININE 4.9 (*)     GFR Non- 11 (*)     GFR  14 (*)     Total Protein 6.3 (*)     ALT 6 (*)     AST 12 (*)     All other components within normal limits    Narrative:     Performed at:  [facility]  [address],  [place], [address] [place]   Phone [phone]   MAGNESIUM - Abnormal; Notable for the following components:    Magnesium 3.00 (*)     All other components within normal limits    Narrative:     Performed at:  [facility]  [address],  [place], [address] [place]   Phone [phone]   RAPID INFLUENZA A/B ANTIGENS    Narrative:     Performed at:  [address],  [place], [address] [place]   Phone [phone]   LIPASE    Narrative:     Performed at:  [facility]  [address],  [place], [address] [place]   Phone [phone]   LACTIC ACID, PLASMA    Narrative:     Performed at:  [facility]  [address],  [place], [address] [place]   Phone [phone]   URINE RT REFLEX TO CULTURE       All other labs were within normal range or not returned as of this dictation. EMERGENCY DEPARTMENT COURSE and DIFFERENTIAL DIAGNOSIS/MDM:   Vitals:    Vitals:    03/26/20 1425 03/26/20 1437 03/26/20 1440 03/26/20 1441   BP: (!) 123/46   (!) 120/109   Pulse: 51 53 (!) 49 51   Resp: 17 15 13 22   Temp:       TempSrc:       SpO2: 100% (!) 80% 100% 94%   Weight:       Height:           Patient evaluated and previous record reviewed. Patient presents with diarrhea and found to be bradycardic and hypotensive. Physical exam as documented above.   Lab work obtained and notable for acute kidney injury with creatinine of 4.9. Chest x-ray as documented above. Chest x-ray was obtained after patient had an episode of desaturation to 80%. Will plan to get urinalysis and admit patient for further work-up and management. CONSULTS:  None    PROCEDURES:  Unless otherwise noted below, none     Procedures      FINAL IMPRESSION      1. STEPHANIE (acute kidney injury) ([address]    2. Diarrhea, unspecified type          DISPOSITION/PLAN   DISPOSITION        PATIENT REFERRED TO:  No follow-up provider specified. DISCHARGE MEDICATIONS:  New Prescriptions    No medications on file     Controlled Substances Monitoring:     RX Monitoring 12/7/2016   Attestation The Prescription Monitoring Report for this patient was reviewed today. Periodic Controlled Substance Monitoring No signs of potential drug abuse or diversion identified.        (Please note that portions of this note were completed with a voice recognition program.  Efforts were made to edit the dictations but occasionally words are mis-transcribed.)    Petros Vargas MD (electronically signed)  Attending Emergency Physician            Gordon Selby MD  03/26/20 [id number]

## 2020-03-27 ENCOUNTER — APPOINTMENT (OUTPATIENT)
Dept: ULTRASOUND IMAGING | Age: 85
DRG: 392 | End: 2020-03-27
Payer: MEDICARE

## 2020-03-27 LAB
ANION GAP SERPL CALCULATED.3IONS-SCNC: 16 MMOL/L (ref 3–16)
BASOPHILS ABSOLUTE: 0 K/UL (ref 0–0.2)
BASOPHILS RELATIVE PERCENT: 0.5 %
BUN BLDV-MCNC: 71 MG/DL (ref 7–20)
C DIFF TOXIN/ANTIGEN: NORMAL
CALCIUM SERPL-MCNC: 8.3 MG/DL (ref 8.3–10.6)
CHLORIDE BLD-SCNC: 106 MMOL/L (ref 99–110)
CO2: 21 MMOL/L (ref 21–32)
CREAT SERPL-MCNC: 4.4 MG/DL (ref 0.8–1.3)
EOSINOPHILS ABSOLUTE: 0.2 K/UL (ref 0–0.6)
EOSINOPHILS RELATIVE PERCENT: 2.2 %
GFR AFRICAN AMERICAN: 16
GFR NON-AFRICAN AMERICAN: 13
GLUCOSE BLD-MCNC: 82 MG/DL (ref 70–99)
HCT VFR BLD CALC: 33.3 % (ref 40.5–52.5)
HEMOGLOBIN: 11.1 G/DL (ref 13.5–17.5)
LYMPHOCYTES ABSOLUTE: 1.3 K/UL (ref 1–5.1)
LYMPHOCYTES RELATIVE PERCENT: 17.8 %
MCH RBC QN AUTO: 29.8 PG (ref 26–34)
MCHC RBC AUTO-ENTMCNC: 33.3 G/DL (ref 31–36)
MCV RBC AUTO: 89.3 FL (ref 80–100)
MONOCYTES ABSOLUTE: 0.5 K/UL (ref 0–1.3)
MONOCYTES RELATIVE PERCENT: 6.9 %
NEUTROPHILS ABSOLUTE: 5.1 K/UL (ref 1.7–7.7)
NEUTROPHILS RELATIVE PERCENT: 72.6 %
PDW BLD-RTO: 15.7 % (ref 12.4–15.4)
PLATELET # BLD: 94 K/UL (ref 135–450)
PMV BLD AUTO: 8 FL (ref 5–10.5)
POTASSIUM REFLEX MAGNESIUM: 4.1 MMOL/L (ref 3.5–5.1)
RBC # BLD: 3.72 M/UL (ref 4.2–5.9)
SODIUM BLD-SCNC: 143 MMOL/L (ref 136–145)
TSH REFLEX: 4.01 UIU/ML (ref 0.27–4.2)
WBC # BLD: 7.1 K/UL (ref 4–11)

## 2020-03-27 PROCEDURE — 2060000000 HC ICU INTERMEDIATE R&B

## 2020-03-27 PROCEDURE — 6370000000 HC RX 637 (ALT 250 FOR IP): Performed by: INTERNAL MEDICINE

## 2020-03-27 PROCEDURE — 2580000003 HC RX 258: Performed by: INTERNAL MEDICINE

## 2020-03-27 PROCEDURE — 85025 COMPLETE CBC W/AUTO DIFF WBC: CPT

## 2020-03-27 PROCEDURE — 2700000000 HC OXYGEN THERAPY PER DAY

## 2020-03-27 PROCEDURE — 87505 NFCT AGENT DETECTION GI: CPT

## 2020-03-27 PROCEDURE — 2500000003 HC RX 250 WO HCPCS: Performed by: INTERNAL MEDICINE

## 2020-03-27 PROCEDURE — 84443 ASSAY THYROID STIM HORMONE: CPT

## 2020-03-27 PROCEDURE — 36415 COLL VENOUS BLD VENIPUNCTURE: CPT

## 2020-03-27 PROCEDURE — 94761 N-INVAS EAR/PLS OXIMETRY MLT: CPT

## 2020-03-27 PROCEDURE — 80048 BASIC METABOLIC PNL TOTAL CA: CPT

## 2020-03-27 PROCEDURE — 6360000002 HC RX W HCPCS: Performed by: INTERNAL MEDICINE

## 2020-03-27 PROCEDURE — 76770 US EXAM ABDO BACK WALL COMP: CPT

## 2020-03-27 RX ADMIN — HEPARIN SODIUM 5000 UNITS: 5000 INJECTION INTRAVENOUS; SUBCUTANEOUS at 16:01

## 2020-03-27 RX ADMIN — ASPIRIN 81 MG: 81 TABLET, COATED ORAL at 10:07

## 2020-03-27 RX ADMIN — FLUOXETINE 20 MG: 20 CAPSULE ORAL at 10:07

## 2020-03-27 RX ADMIN — HEPARIN SODIUM 5000 UNITS: 5000 INJECTION INTRAVENOUS; SUBCUTANEOUS at 21:05

## 2020-03-27 RX ADMIN — HEPARIN SODIUM 5000 UNITS: 5000 INJECTION INTRAVENOUS; SUBCUTANEOUS at 05:12

## 2020-03-27 RX ADMIN — LEVOTHYROXINE SODIUM 175 MCG: 150 TABLET ORAL at 05:12

## 2020-03-27 RX ADMIN — SODIUM BICARBONATE: 84 INJECTION, SOLUTION INTRAVENOUS at 17:24

## 2020-03-27 RX ADMIN — ATORVASTATIN CALCIUM 20 MG: 10 TABLET, FILM COATED ORAL at 10:07

## 2020-03-27 NOTE — PROGRESS NOTES
Hospitalist Progress Note      PCP: Michael James MD    Date of Admission: 3/26/2020    Chief Complaint: Diarrhea, bradycardia    Hospital Course:   Patient is an 79-year-old male with a past medical history of hypertension, hyperlipidemia, coronary artery disease who presents with chills, diarrhea and found to be bradycardic. Patient reports that he feels chilled but denies any fever. Staff in his facility found him covered in diarrhea this morning. When EMS arrived they found his pulse to be in the 30s to 40s and his blood pressure to be 70/50. Patient was given atropine and seemed to respond however patient has maintained heart rate in the upper 50s with a blood pressure in the 120s since administration. Patient denies any abdominal pain. He reports nausea but denies any vomiting. Subjective:   Pt is on 3 LPM with O2 sat at 100%. Afebrile. Bradycardic with soft BPs. No dyspnea or chest pain. No N/V. x1 diarrhea this morning. Medications:  Reviewed    Infusion Medications    IV infusion builder 75 mL/hr at 03/26/20 2013     Scheduled Medications    aspirin EC  81 mg Oral Daily    FLUoxetine  20 mg Oral Daily    atorvastatin  20 mg Oral Daily    sodium chloride flush  10 mL Intravenous 2 times per day    heparin (porcine)  5,000 Units Subcutaneous 3 times per day    levothyroxine  175 mcg Oral Daily     PRN Meds: sodium chloride flush, acetaminophen **OR** acetaminophen, promethazine **OR** [DISCONTINUED] ondansetron      Intake/Output Summary (Last 24 hours) at 3/27/2020 1418  Last data filed at 3/27/2020 1354  Gross per 24 hour   Intake 200 ml   Output 825 ml   Net -625 ml       Physical Exam Performed:    BP (!) 91/52   Pulse (!) 46   Temp 99.2 °F (37.3 °C) (Oral)   Resp 18   Ht 5' 9\" (1.753 m)   Wt 132 lb 0.9 oz (59.9 kg)   SpO2 100%   BMI 19.50 kg/m²     General appearance: Elderly male in no apparent distress, appears stated age and cooperative.   HEENT: Pupils equal, round, and Diarrhea:   - C. Diff negative. Check GI bacterial pathogens. - Continue supportive care. - Continue IV fluids.      Sinus bradycardia:  - Initially thought due to BB which is being held. - Consult Cardiology for further assistance. - Monitor pt on telemetry.      Hypertension, currently with hypotension:  - Likely secondary to dehydration as well as bradycardia.  - Hold his home BP meds and diuretics for now. - Continue IV fluids.      Acute kidney injury on CKD stage 4:  - Due to renal hypoperfusion in setting of diarrhea, hypotension and bradycardia. - Baseline serum Cr 2.2-2.4. 4.9 on admission.   - Renal US with no obstruction.   - Continue IV fluids.      Anion gap metabolic acidosis:  - Due to diarrhea and STEPHANIE/CKD. - Continue bicarb infusion per Nephro.      Chronic thrombocytopenia:  - Stable. Monitor closely.      Dementia:  - Holding Aricept and Namenda.     Depression:  - Mood stable. Continue Prozac.     Hypothyroidism:  - Continue levothyroxine. TSH WNL.    Coronary artery disease/mild elevation in troponin:  - Probably secondary to supply demand mismatch. - Continue statin and aspirin. Holding BB.   - Consult Cardiology.      Chronic systolic CHF:  - EF 40 to 45%. Diuretics on hold secondary to acute renal insufficiency, monitor fluid status closely.      Hyperlipidemia:  - Continue Zocor.       DVT Prophylaxis: SQ heparin   Diet: DIET CARDIAC;  Code Status: Full Code    PT/OT Eval Status: Not indicated    Dispo - 2-3 days     TREVOR Cartwright - CNP

## 2020-03-27 NOTE — CONSULTS
[facility]  Consult Note           Reason for Consult:  Acute on chronic kidney disease  Requesting Physician:  Dr. Vonne Peabody    Chief Complaint:  Hypotension, bradycardia  History Obtained From:  patient, electronic medical record    History of Present Ilness:    82year old male NH resident, poor historian admitted with hypotension, bradycardia. We have been asked to assist in further mgmt of STEPHANIE/CKD. Hypotensive ~70's/50's, bradycardic in 30-40's, given IVF's, atropine with improvement. Has been c/o diarrhea, unclear for how long. Denies any sob, chest pain, abdominal pain, reduced intake, or fevers. Past Medical History:        Diagnosis Date    CAD (coronary artery disease) 8/4/2011    Cognitive impairment 8/4/2011    Essential hypertension, benign 8/4/2011    Hypothyroid 8/4/2011    PAD (peripheral artery disease) 8/4/2011    Pure hypercholesterolemia 8/4/2011       Past Surgical History:        Procedure Laterality Date    CORONARY ARTERY BYPASS GRAFT      KNEE SURGERY Left     knee replacement       Home Medications:    No current facility-administered medications on file prior to encounter.       Current Outpatient Medications on File Prior to Encounter   Medication Sig Dispense Refill    isosorbide mononitrate (IMDUR) 30 MG extended release tablet TAKE 1 TABLET BY MOUTH EVERY DAY 30 tablet 3    hydrALAZINE (APRESOLINE) 25 MG tablet TAKE 1 TABLET BY MOUTH TWICE DAILY 60 tablet 3    memantine (NAMENDA) 10 MG tablet Take 1 tablet by mouth 2 times daily 60 tablet 3    FLUoxetine (PROZAC) 20 MG capsule Take 1 capsule by mouth daily 30 capsule 3    furosemide (LASIX) 40 MG tablet Take 1 tablet by mouth 2 times daily 60 tablet 3    ferrous sulfate (FE TABS) 325 (65 Fe) MG EC tablet Take 1 tablet by mouth daily (with breakfast) 30 tablet 3    levothyroxine (SYNTHROID) 175 MCG tablet TAKE 1 TABLET BY MOUTH DAILY 90 tablet 3    simvastatin (ZOCOR) 40 MG tablet TAKE 1 TABLET BY MOUTH EVERY DAY 90 tablet 3    ranitidine (ZANTAC) 150 MG tablet TAKE 1 TABLET BY MOUTH TWICE DAILY 180 tablet 3    donepezil (ARICEPT) 10 MG tablet TAKE 1 TABLET BY MOUTH EVERY NIGHT 90 tablet 3    carvedilol (COREG) 3.125 MG tablet TAKE 1 TABLET BY MOUTH TWICE DAILY WITH MEALS 180 tablet 5    fluticasone (FLONASE) 50 MCG/ACT nasal spray SHAKE LIQUID AND USE 1 SPRAY IN EACH NOSTRIL DAILY 1 Bottle 2    senna-docusate (PERICOLACE) 8.6-50 MG per tablet TAKE 2 TABLETS BY MOUTH DAILY AS NEEDED FOR CONSTIPATION 60 tablet 0    loratadine (CLARITIN) 10 MG tablet Take 1 tablet by mouth daily 30 tablet 5    aspirin EC 81 MG EC tablet Take 1 tablet by mouth daily 30 tablet 3       Allergies:  Patient has no known allergies.     Social History:    Social History     Socioeconomic History    Marital status:      Spouse name: Not on file    Number of children: Not on file    Years of education: Not on file    Highest education level: Not on file   Occupational History    Not on file   Social Needs    Financial resource strain: Not on file    Food insecurity     Worry: Not on file     Inability: Not on file    Transportation needs     Medical: Not on file     Non-medical: Not on file   Tobacco Use    Smoking status: Former Smoker     Packs/day: 0.50     Years: 63.00     Pack years: 31.50     Types: Cigarettes    Smokeless tobacco: Never Used    Tobacco comment: 1 pack per week   Substance and Sexual Activity    Alcohol use: No    Drug use: No    Sexual activity: Not on file   Lifestyle    Physical activity     Days per week: Not on file     Minutes per session: Not on file    Stress: Not on file   Relationships    Social connections     Talks on phone: Not on file     Gets together: Not on file     Attends Uatsdin service: Not on file     Active member of club or organization: Not on file     Attends meetings of clubs or organizations: Not on file     Relationship status: Not on file   Patrick hypotension, & bradycardia  - Data: SCr 4.9 on admission, UA bland, Renal US with no obstruction. - Clinical: Slowly improving, non-oliguric    Chronic kidney disease stage 4  - Etiology:  Renovascular. Renal US shows atrophic left kidney,  Likely has a solitary right kidney   - Baseline SCr 2.2 - 2.4     Anion-gap metabolic acidosis  - Etiology: Multifactorial in setting of STEPHANIE, diarrhea    Systolic heart failure  - EF is 29-47%, grade 1 diastolic dysfunction, moderate AS from 2019      Plan/    - Continue IVF's with bicarbonate replacement 75 ml/hour  - Hold lasix  - Trend labs for hopeful improvement      Thank you for the consultation. Please do not hesitate to call with questions.     [employer]

## 2020-03-28 LAB
ALBUMIN SERPL-MCNC: 3.7 G/DL (ref 3.4–5)
ANION GAP SERPL CALCULATED.3IONS-SCNC: 11 MMOL/L (ref 3–16)
BUN BLDV-MCNC: 55 MG/DL (ref 7–20)
CALCIUM SERPL-MCNC: 8 MG/DL (ref 8.3–10.6)
CHLORIDE BLD-SCNC: 111 MMOL/L (ref 99–110)
CO2: 26 MMOL/L (ref 21–32)
CREAT SERPL-MCNC: 3.2 MG/DL (ref 0.8–1.3)
GFR AFRICAN AMERICAN: 23
GFR NON-AFRICAN AMERICAN: 19
GI BACTERIAL PATHOGENS BY PCR: NORMAL
GLUCOSE BLD-MCNC: 87 MG/DL (ref 70–99)
PHOSPHORUS: 3.1 MG/DL (ref 2.5–4.9)
POTASSIUM SERPL-SCNC: 4.1 MMOL/L (ref 3.5–5.1)
SODIUM BLD-SCNC: 148 MMOL/L (ref 136–145)

## 2020-03-28 PROCEDURE — 97166 OT EVAL MOD COMPLEX 45 MIN: CPT

## 2020-03-28 PROCEDURE — 97116 GAIT TRAINING THERAPY: CPT

## 2020-03-28 PROCEDURE — 2580000003 HC RX 258: Performed by: INTERNAL MEDICINE

## 2020-03-28 PROCEDURE — 99223 1ST HOSP IP/OBS HIGH 75: CPT | Performed by: INTERNAL MEDICINE

## 2020-03-28 PROCEDURE — 6360000002 HC RX W HCPCS: Performed by: INTERNAL MEDICINE

## 2020-03-28 PROCEDURE — 97162 PT EVAL MOD COMPLEX 30 MIN: CPT

## 2020-03-28 PROCEDURE — 80069 RENAL FUNCTION PANEL: CPT

## 2020-03-28 PROCEDURE — 2060000000 HC ICU INTERMEDIATE R&B

## 2020-03-28 PROCEDURE — 6370000000 HC RX 637 (ALT 250 FOR IP): Performed by: INTERNAL MEDICINE

## 2020-03-28 PROCEDURE — 97530 THERAPEUTIC ACTIVITIES: CPT

## 2020-03-28 PROCEDURE — 2500000003 HC RX 250 WO HCPCS: Performed by: INTERNAL MEDICINE

## 2020-03-28 RX ORDER — SODIUM CHLORIDE 450 MG/100ML
INJECTION, SOLUTION INTRAVENOUS CONTINUOUS
Status: DISCONTINUED | OUTPATIENT
Start: 2020-03-28 | End: 2020-03-29

## 2020-03-28 RX ADMIN — ATORVASTATIN CALCIUM 20 MG: 10 TABLET, FILM COATED ORAL at 08:58

## 2020-03-28 RX ADMIN — FLUOXETINE 20 MG: 20 CAPSULE ORAL at 08:58

## 2020-03-28 RX ADMIN — HEPARIN SODIUM 5000 UNITS: 5000 INJECTION INTRAVENOUS; SUBCUTANEOUS at 05:51

## 2020-03-28 RX ADMIN — SODIUM BICARBONATE: 84 INJECTION, SOLUTION INTRAVENOUS at 05:51

## 2020-03-28 RX ADMIN — ACETAMINOPHEN 650 MG: 325 TABLET ORAL at 08:58

## 2020-03-28 RX ADMIN — Medication 10 ML: at 20:23

## 2020-03-28 RX ADMIN — ASPIRIN 81 MG: 81 TABLET, COATED ORAL at 08:58

## 2020-03-28 RX ADMIN — HEPARIN SODIUM 5000 UNITS: 5000 INJECTION INTRAVENOUS; SUBCUTANEOUS at 20:23

## 2020-03-28 RX ADMIN — SODIUM CHLORIDE: 4.5 INJECTION, SOLUTION INTRAVENOUS at 17:55

## 2020-03-28 RX ADMIN — LEVOTHYROXINE SODIUM 175 MCG: 150 TABLET ORAL at 05:51

## 2020-03-28 ASSESSMENT — PAIN SCALES - GENERAL
PAINLEVEL_OUTOF10: 0
PAINLEVEL_OUTOF10: 0
PAINLEVEL_OUTOF10: 2
PAINLEVEL_OUTOF10: 0

## 2020-03-28 NOTE — PROGRESS NOTES
Hospitalist Progress Note      PCP: Sj Thakur MD    Date of Admission: 3/26/2020    Chief Complaint: Diarrhea, bradycardia    Hospital Course:   Patient is an 56-year-old male with a past medical history of hypertension, hyperlipidemia, coronary artery disease who presents with chills, diarrhea and found to be bradycardic. Patient reports that he feels chilled but denies any fever. Staff in his facility found him covered in diarrhea this morning. When EMS arrived they found his pulse to be in the 30s to 40s and his blood pressure to be 70/50. Patient was given atropine and seemed to respond however patient has maintained heart rate in the upper 50s with a blood pressure in the 120s since administration. Patient denies any abdominal pain. He reports nausea but denies any vomiting. Subjective:   Pt is on RA. Afebrile. Bradycardic with HR in the 40s with stable BP. No dyspnea or chest pain. No N/V/D. Medications:  Reviewed    Infusion Medications    IV infusion builder 75 mL/hr at 03/28/20 0551     Scheduled Medications    aspirin EC  81 mg Oral Daily    FLUoxetine  20 mg Oral Daily    atorvastatin  20 mg Oral Daily    sodium chloride flush  10 mL Intravenous 2 times per day    heparin (porcine)  5,000 Units Subcutaneous 3 times per day    levothyroxine  175 mcg Oral Daily     PRN Meds: sodium chloride flush, acetaminophen **OR** acetaminophen, promethazine **OR** [DISCONTINUED] ondansetron      Intake/Output Summary (Last 24 hours) at 3/28/2020 1318  Last data filed at 3/28/2020 0825  Gross per 24 hour   Intake 2597 ml   Output 850 ml   Net 1747 ml       Physical Exam Performed:    BP (!) 112/59   Pulse (!) 47   Temp 98.6 °F (37 °C) (Oral)   Resp 16   Ht 5' 9\" (1.753 m)   Wt 132 lb 8 oz (60.1 kg)   SpO2 100%   BMI 19.57 kg/m²     General appearance: Elderly male in no apparent distress, appears stated age and cooperative. HEENT: Pupils equal, round, and reactive to light.

## 2020-03-28 NOTE — CONSULTS
orthopnea, PND, lower extremity edema, abdominal swelling, shortness of breath, dyspnea on exertion, chills, visual changes, headaches, sore throat, cough, abdominal pain, nausea, vomiting, bleeding, bruising, dysuria, muscle/joint pain, confusion, depression, anxiety, skin lesions, etc.    Past Medical History:   Diagnosis Date    CAD (coronary artery disease) 8/4/2011    Cognitive impairment 8/4/2011    Essential hypertension, benign 8/4/2011    Hypothyroid 8/4/2011    PAD (peripheral artery disease) 8/4/2011    Pure hypercholesterolemia 8/4/2011        Past Surgical History:   Procedure Laterality Date    CORONARY ARTERY BYPASS GRAFT      KNEE SURGERY Left     knee replacement       No Known Allergies    Social History:  Reviewed. reports that he has quit smoking. His smoking use included cigarettes. He has a 31.50 pack-year smoking history. He has never used smokeless tobacco. He reports that he does not drink alcohol or use drugs. Family History:  Reviewed. family history includes Hypertension in his father and mother. No premature CAD. Review of System:  All other systems reviewed except for that noted above. Pertinent negatives and positives are:     · General: negative for fever, chills   · Ophthalmic ROS: negative for - eye pain or loss of vision  · ENT ROS: negative for - headaches, sore throat   · Respiratory: negative for - cough, sputum  · Cardiovascular: Reviewed in HPI  · Gastrointestinal: negative for - abdominal pain, diarrhea, N/V  · Hematology: negative for - bleeding, blood clots, bruising or jaundice  · Genito-Urinary:  negative for - Dysuria or incontinence  · Musculoskeletal: negative for - Joint swelling, muscle pain  · Neurological: negative for - confusion, dizziness, headaches   · Psychiatric: No anxiety, no depression.   · Dermatological: negative for - rash    Physical Examination:  Vitals:    03/28/20 0856   BP: (!) 112/59   Pulse: (!) 47   Resp: 16   Temp: 98.6 °F (37 °C)   SpO2: 100%        Intake/Output Summary (Last 24 hours) at 3/28/2020 1558  Last data filed at 3/28/2020 1352  Gross per 24 hour   Intake 1439 ml   Output 1250 ml   Net 189 ml     In: 1319 [P.O.:360; I.V.:959]  Out: 900    Wt Readings from Last 3 Encounters:   20 132 lb 8 oz (60.1 kg)   20 139 lb (63 kg)   02/10/20 147 lb (66.7 kg)     Temp  Av.7 °F (37.1 °C)  Min: 98.3 °F (36.8 °C)  Max: 99.2 °F (37.3 °C)  Pulse  Av.3  Min: 47  Max: 58  BP  Min: 112/59  Max: 121/55  SpO2  Av.5 %  Min: 99 %  Max: 100 %    · Telemetry: Has bradycardia  · Constitutional: Alert. Oriented to person, place, and time. No distress. · Head: Normocephalic and atraumatic. · Mouth/Throat: Lips appear moist. Oropharynx is clear and moist.   · Cardiovascular: Normal rate, regular rhythm. Normal S1&S2. · Pulmonary/Chest: Bilateral respiratory sounds present. No respiratory accessory muscle use. · Abdominal: Soft. Normal bowel sounds present. No distension, No tenderness. · Musculoskeletal: No tenderness. No edema    · Neurological: Alert and oriented. Cranial nerve II-XII grossly intact. · Skin: Skin is warm and dry. No rash, lesions, ulcerations noted. · Psychiatric: No anxiety nor agitation. Labs:  Reviewed.    Recent Labs     20  1332 20  0534 20  0516    143 148*   K 4.6 4.1 4.1    106 111*   CO2 20* 21 26   PHOS  --   --  3.1   BUN 74* 71* 55*   CREATININE 4.9* 4.4* 3.2*     Recent Labs     20  1332 20  0534   WBC 6.6 7.1   HGB 11.4* 11.1*   HCT 34.6* 33.3*   MCV 89.6 89.3   PLT 93* 94*     Lab Results   Component Value Date    CKTOTAL 288 04/10/2012    TROPONINI 0.08 2020     No results found for: BNP  Lab Results   Component Value Date    PROTIME 12.1 2020    PROTIME 12.5 2019    INR 1.04 2020    INR 1.08 2019     Lab Results   Component Value Date    CHOL 139 2020    HDL 47 2020    TRIG 100 2020 telemetry.     Scheduled Meds:   aspirin EC  81 mg Oral Daily    FLUoxetine  20 mg Oral Daily    atorvastatin  20 mg Oral Daily    sodium chloride flush  10 mL Intravenous 2 times per day    heparin (porcine)  5,000 Units Subcutaneous 3 times per day    levothyroxine  175 mcg Oral Daily     Continuous Infusions:   sodium chloride       PRN Meds:.sodium chloride flush, acetaminophen **OR** acetaminophen, promethazine **OR** [DISCONTINUED] ondansetron     Assessment:   Patient Active Problem List    Diagnosis Date Noted    Bradycardia 79/67/8846    Acute systolic (congestive) heart failure ([facility] [address] 01/29/2020    Acute on chronic systolic CHF (congestive heart failure) ([facility] [address] 12/23/2019    Ischemic cardiomyopathy 12/01/2019    Left bundle branch block 12/01/2019    Nonrheumatic mitral valve regurgitation 12/01/2019    NSTEMI (non-ST elevated myocardial infarction) ([facility] [address] 12/25/2016    Acute on chronic systolic congestive heart failure ([facility] [address] 12/25/2016    Bilateral low back pain with right-sided sciatica 10/04/2015    Murmur, cardiac 10/29/2013    Tobacco abuse 10/29/2013    Xeroderma 03/19/2013    Contact dermatitis 11/09/2012    Fatigue 11/02/2012    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 Grecia Verdugo is a pleasant 82year old male with a medical history significant for hypertension, ischemic cardiomyopathy status post CABG, aortic valve stenosis, mitral valve regurgitation, chronic

## 2020-03-28 NOTE — PROGRESS NOTES
Physical Therapy    Facility/Department: [facility] C4 PCU  Initial Assessment    NAME: Crystal Raman  : 1935  MRN: [id number]    Date of Service: 3/28/2020    Discharge Recommendations:  Subacute/Skilled Nursing Facility   PT Equipment Recommendations  Equipment Needed: No(defer)    Assessment    Body structures, Functions, Activity limitations: Decreased functional mobility ; Decreased safe awareness;Decreased balance;Decreased ADL status; Decreased cognition;Decreased endurance;Decreased posture  Assessment: Patient seen for gait training. Patient cleared by RN for therapy participation this date. Patient agreeable to therapy. Patient admitted for bradycardia with PMH CAD and dementia. Patient completed transfers with min A. Pt ambulated first 20 ft without AD with short shuffling steps and unsteadiness, remaining ambulation completed with RW with improved stability and step length with cues. P.T .will continue to follow throughout LOS. Recommending DC to SNF d/t significant decrease in mobility from baseline. Treatment Diagnosis: decreased independence with gait  Specific instructions for Next Treatment: progress ambulation  Prognosis: Good  Decision Making: Medium Complexity  PT Education: Goals;Precautions;Orientation; Functional Mobility Training;PT Role;Transfer Training;Plan of Care;Gait Training;General Safety  Patient Education: pt requires reinforcement  Barriers to Learning: dementia  REQUIRES PT FOLLOW UP: Yes  Activity Tolerance  Activity Tolerance: Patient limited by fatigue       Patient Diagnosis(es): The primary encounter diagnosis was Acute renal failure superimposed on chronic kidney disease, unspecified CKD stage, unspecified acute renal failure type ([facility] [address] A diagnosis of Diarrhea, unspecified type was also pertinent to this visit.      has a past medical history of CAD (coronary artery disease), Cognitive impairment, Essential hypertension, benign, Hypothyroid, PAD (peripheral artery

## 2020-03-28 NOTE — PROGRESS NOTES
[facility]       Progress Note           Subjective/   82y.o. year old male who we are seeing in consultation for A/CKD. HPI:  Renal function improving with IVF's, urine output of 850 ml over last 24 hours. Intake reduced. ROS:  No sob, abdominal pain. Objective/   GEN:  Chronically ill, BP (!) 112/59   Pulse (!) 47   Temp 98.6 °F (37 °C) (Oral)   Resp 16   Ht 5' 9\" (1.753 m)   Wt 132 lb 8 oz (60.1 kg)   SpO2 100%   BMI 19.57 kg/m²   HEENT: non-icteric, no JVD  CV: S1, S2, bradycardic without m/r/g; no LE edema  RESP: CTA B without w/r/r; breathing wnl  ABD: +bs, soft, nt, no hsm  SKIN: warm, no rashes    Data/  Recent Labs     03/26/20  1332 03/27/20  0534   WBC 6.6 7.1   HGB 11.4* 11.1*   HCT 34.6* 33.3*   MCV 89.6 89.3   PLT 93* 94*     Recent Labs     03/26/20  1332 03/27/20  0534 03/28/20  0516    143 148*   K 4.6 4.1 4.1    106 111*   CO2 20* 21 26   GLUCOSE 85 82 87   PHOS  --   --  3.1   MG 3.00*  --   --    BUN 74* 71* 55*   CREATININE 4.9* 4.4* 3.2*   LABGLOM 11* 13* 19*   GFRAA 14* 16* 23*       Assessment/     Acute kidney injury  - Etiology: Pre-renal/Renal hypoperfusion in setting of diarrhea, hypotension, & bradycardia  - Data: SCr 4.9 on admission, UA bland, Renal US with no obstruction.   - Clinical: Slowly improving, non-oliguric     Chronic kidney disease stage 4  - Etiology:  Renovascular.  Renal US shows atrophic left kidney, Rafy Washington has a solitary right kidney   - Baseline SCr 2.2 - 2.4      Anion-gap metabolic acidosis  - Etiology: Multifactorial in setting of STEPHANIE, diarrhea  - Clinical: Resolved with IVF's with bicarbonate replacement     Systolic heart failure  - EF is 35-04%, grade 1 diastolic dysfunction, moderate AS from 2019    Hypernatremia       Plan/     - Change IVF's to 1/2 NS 75 ml/hour  - Lasix on hold  - Trend labs

## 2020-03-28 NOTE — PROGRESS NOTES
Occupational Therapy   Occupational Therapy Initial Assessment and Treatment  Date: 3/28/2020   Patient Name: Bobby Lassiter  MRN: [id number]     : 1935    Date of Service: 3/28/2020    Discharge Recommendations:  [address]  OT Equipment Recommendations  Equipment Needed: No  Other: defer    Assessment   Performance deficits / Impairments: Decreased functional mobility ; Decreased ADL status; Decreased strength;Decreased endurance;Decreased high-level IADLs;Decreased posture;Decreased cognition;Decreased safe awareness;Decreased balance;Decreased coordination  After evaluation and treatment, pt found to be presenting with the above mentioned deficits. Pt would benefit from continued skilled occupational therapy to address these deficits, increasing safety and independence with ADL and functional mobility. He currently lives in independent living, but presents with need for min A with RW for basic ADL and cognitive deficits that pose safety concern if pt does not have 24hr supervision at this time. Will continue to assess for discharge needs. Prognosis: Good  Decision Making: Medium Complexity  REQUIRES OT FOLLOW UP: Yes  Activity Tolerance  Activity Tolerance: 96% on RA seated EOB at rest, 99% after ~5 mins functional standing activity  Safety Devices  Safety Devices in place: Yes  Type of devices: Call light within reach; Chair alarm in place; Left in chair;Nurse notified;Gait belt     Patient Diagnosis(es): The primary encounter diagnosis was Acute renal failure superimposed on chronic kidney disease, unspecified CKD stage, unspecified acute renal failure type ([facility] [address] A diagnosis of Diarrhea, unspecified type was also pertinent to this visit. has a past medical history of CAD (coronary artery disease), Cognitive impairment, Essential hypertension, benign, Hypothyroid, PAD (peripheral artery disease), and Pure hypercholesterolemia.    has a past surgical history that includes knee surgery (Left) and Coronary artery bypass graft. Restrictions  Restrictions/Precautions  Restrictions/Precautions: Fall Risk  Position Activity Restriction  Other position/activity restrictions: Up with assist    Subjective   General  Chart Reviewed: Yes  Patient assessed for rehabilitation services?: Yes  Additional Pertinent Hx: Dementia  Family / Caregiver Present: No  Referring Practitioner: TREVOR Tracey CNP  Diagnosis: Bradycardia, hypotension, diarrhea  Subjective  Subjective: RN cleared pt for tx. Pt supine at session start. Patient Currently in Pain: Denies    Social/Functional History  Social/Functional History  Lives With: Alone  Type of Home: Facility(independent living)  Home Layout: One level  Home Access: Level entry  Bathroom Shower/Tub: Walk-in shower  Bathroom Equipment: Shower chair  Home Equipment: Wheelchair-electric  ADL Assistance: (bathing, dressing independently)  [address]: Needs assistance  Homemaking Responsibilities: No  Ambulation Assistance: Independent(household distances)  Transfer Assistance: Independent  Additional Comments: Pt is a questionable historian and no family present. Objective   Vision: Within Functional Limits  Hearing: Within functional limits      Orientation  Overall Orientation Status: Impaired  Orientation Level: Oriented to person;Oriented to place; Disoriented to situation(oriented to year only)     Observation/Palpation  Posture: Fair     Balance  Sitting Balance: Stand by assistance  Standing Balance: Minimal assistance(with RW)  Functional Mobility  Functional Mobility Comments: Pt walked ~100ft to perform ADL and build functional standing activity tolerance with RW and min A. Pt needing mod VCs for safety and technique.      ADL  Feeding: Setup(to eat at end of session)  Toileting: Dependent/Total(guillory)     Tone RUE  RUE Tone: Normotonic  Tone LUE  LUE Tone: Normotonic  Coordination  Movements Are Fluid And Coordinated: Yes        Bed mobility  Scooting: Stand by assistance(to EOB)     Transfers  Sit to stand: Minimal assistance  Stand to sit: Minimal assistance        Cognition  Overall Cognitive Status: Exceptions  Arousal/Alertness: Appropriate responses to stimuli  Following Commands: Follows one step commands consistently  Attention Span: Difficulty dividing attention  Memory: Decreased short term memory;Decreased recall of biographical Information;Decreased recall of recent events  Safety Judgement: Decreased awareness of need for assistance;Decreased awareness of need for safety  Problem Solving: Assistance required to generate solutions;Assistance required to implement solutions;Assistance required to identify errors made;Assistance required to correct errors made;Decreased awareness of errors  Insights: Decreased awareness of deficits  Initiation: Requires cues for some  Sequencing: Requires cues for some           Sensation  Overall Sensation Status: WFL(per pt report)                       BUE strength and AROM are [facility] based on functional presentation. Education: Role of OT, safe t/f training, safe use of DME, awareness of deficits, discharge planning, ADL as therapeutic exercise, importance of OOB, cognitive orientation          Plan   Plan  Times per week: 3-5    AM-PAC Score        AM-[facility] Inpatient Daily Activity Raw Score: 14 (03/28/20 1256)  AM-PAC Inpatient ADL T-Scale Score : 33.39 (03/28/20 1256)  ADL Inpatient CMS 0-100% Score: 59.67 (03/28/20 1256)  ADL Inpatient CMS G-Code Modifier : CK (03/28/20 1256)    Goals  Short term goals  Time Frame for Short term goals: 4/4/20  Short term goal 1: LB dressing with CGA  Short term goal 2: Toilet t/f from ambulatory level with CGA by 4/2/20  Short term goal 3: 3 grooming tasks standing with SBA  Short term goal 4: Tolerate 8 mins functional standing activity with SBA  Patient Goals   Patient goals : \"Get stronger and improve balance. \"       Therapy Time

## 2020-03-29 LAB
ALBUMIN SERPL-MCNC: 3.4 G/DL (ref 3.4–5)
ANION GAP SERPL CALCULATED.3IONS-SCNC: 12 MMOL/L (ref 3–16)
BUN BLDV-MCNC: 44 MG/DL (ref 7–20)
CALCIUM SERPL-MCNC: 7.8 MG/DL (ref 8.3–10.6)
CHLORIDE BLD-SCNC: 107 MMOL/L (ref 99–110)
CO2: 25 MMOL/L (ref 21–32)
CREAT SERPL-MCNC: 2.4 MG/DL (ref 0.8–1.3)
GFR AFRICAN AMERICAN: 31
GFR NON-AFRICAN AMERICAN: 26
GLUCOSE BLD-MCNC: 84 MG/DL (ref 70–99)
PHOSPHORUS: 2.3 MG/DL (ref 2.5–4.9)
POTASSIUM SERPL-SCNC: 4.3 MMOL/L (ref 3.5–5.1)
SODIUM BLD-SCNC: 144 MMOL/L (ref 136–145)

## 2020-03-29 PROCEDURE — 80069 RENAL FUNCTION PANEL: CPT

## 2020-03-29 PROCEDURE — 6360000002 HC RX W HCPCS: Performed by: INTERNAL MEDICINE

## 2020-03-29 PROCEDURE — 2060000000 HC ICU INTERMEDIATE R&B

## 2020-03-29 PROCEDURE — 97116 GAIT TRAINING THERAPY: CPT

## 2020-03-29 PROCEDURE — 99232 SBSQ HOSP IP/OBS MODERATE 35: CPT | Performed by: INTERNAL MEDICINE

## 2020-03-29 PROCEDURE — 6370000000 HC RX 637 (ALT 250 FOR IP): Performed by: INTERNAL MEDICINE

## 2020-03-29 PROCEDURE — 97110 THERAPEUTIC EXERCISES: CPT

## 2020-03-29 RX ORDER — METOPROLOL SUCCINATE 25 MG/1
12.5 TABLET, EXTENDED RELEASE ORAL NIGHTLY
Status: DISCONTINUED | OUTPATIENT
Start: 2020-03-29 | End: 2020-03-31 | Stop reason: HOSPADM

## 2020-03-29 RX ADMIN — ATORVASTATIN CALCIUM 20 MG: 10 TABLET, FILM COATED ORAL at 08:53

## 2020-03-29 RX ADMIN — FLUOXETINE 20 MG: 20 CAPSULE ORAL at 08:53

## 2020-03-29 RX ADMIN — ASPIRIN 81 MG: 81 TABLET, COATED ORAL at 08:53

## 2020-03-29 RX ADMIN — LEVOTHYROXINE SODIUM 175 MCG: 150 TABLET ORAL at 05:21

## 2020-03-29 RX ADMIN — HEPARIN SODIUM 5000 UNITS: 5000 INJECTION INTRAVENOUS; SUBCUTANEOUS at 05:19

## 2020-03-29 RX ADMIN — HEPARIN SODIUM 5000 UNITS: 5000 INJECTION INTRAVENOUS; SUBCUTANEOUS at 14:02

## 2020-03-29 ASSESSMENT — PAIN SCALES - GENERAL
PAINLEVEL_OUTOF10: 0

## 2020-03-29 NOTE — PROGRESS NOTES
Hospitalist Progress Note      PCP: Crow Shell MD    Date of Admission: 3/26/2020    Chief Complaint: Diarrhea, bradycardia    Hospital Course:   Patient is an 45-year-old male with a past medical history of hypertension, hyperlipidemia, coronary artery disease who presents with chills, diarrhea and found to be bradycardic. Patient reports that he feels chilled but denies any fever. Staff in his facility found him covered in diarrhea this morning. When EMS arrived they found his pulse to be in the 30s to 40s and his blood pressure to be 70/50. Patient was given atropine and seemed to respond however patient has maintained heart rate in the upper 50s with a blood pressure in the 120s since administration. Patient denies any abdominal pain. He reports nausea but denies any vomiting. Subjective:   Pt is on RA. Afebrile. VSS. No complaints at this time. Medications:  Reviewed    Infusion Medications    sodium chloride 75 mL/hr at 03/29/20 3761     Scheduled Medications    metoprolol succinate  12.5 mg Oral Nightly    aspirin EC  81 mg Oral Daily    FLUoxetine  20 mg Oral Daily    atorvastatin  20 mg Oral Daily    sodium chloride flush  10 mL Intravenous 2 times per day    heparin (porcine)  5,000 Units Subcutaneous 3 times per day    levothyroxine  175 mcg Oral Daily     PRN Meds: sodium chloride flush, acetaminophen **OR** acetaminophen, promethazine **OR** [DISCONTINUED] ondansetron      Intake/Output Summary (Last 24 hours) at 3/29/2020 1237  Last data filed at 3/29/2020 7780  Gross per 24 hour   Intake 2404 ml   Output 1375 ml   Net 1029 ml       Physical Exam Performed:    /66   Pulse 56   Temp 98.4 °F (36.9 °C) (Oral)   Resp 16   Ht 5' 9\" (1.753 m)   Wt 133 lb 6.4 oz (60.5 kg)   SpO2 98%   BMI 19.70 kg/m²     General appearance: Elderly male in no apparent distress, appears stated age and cooperative. HEENT: Pupils equal, round, and reactive to light.  Conjunctivae/corneas clear.  Neck: Supple, with full range of motion. No jugular venous distention. Trachea midline. Respiratory:  Normal respiratory effort. Clear to auscultation, bilaterally without Rales/Wheezes/Rhonchi. Cardiovascular: Regular rate and rhythm with normal S1/S2 without murmurs, rubs or gallops. Abdomen: Soft, non-tender, non-distended with normal bowel sounds. Musculoskeletal: No clubbing, cyanosis or edema bilaterally. Full range of motion without deformity. Skin: Skin color, texture, turgor normal.  No rashes or lesions. Neurologic:  Neurovascularly intact without any focal sensory/motor deficits. Cranial nerves: II-XII intact, grossly non-focal.  Psychiatric: Alert and oriented, thought content appropriate, normal insight  Capillary Refill: Brisk,< 3 seconds   Peripheral Pulses: +2 palpable, equal bilaterally       Labs:   Recent Labs     03/26/20  1332 03/27/20  0534   WBC 6.6 7.1   HGB 11.4* 11.1*   HCT 34.6* 33.3*   PLT 93* 94*     Recent Labs     03/27/20  0534 03/28/20  0516 03/29/20  0510    148* 144   K 4.1 4.1 4.3    111* 107   CO2 21 26 25   BUN 71* 55* 44*   CREATININE 4.4* 3.2* 2.4*   CALCIUM 8.3 8.0* 7.8*   PHOS  --  3.1 2.3*     Recent Labs     03/26/20  1332   AST 12*   ALT 6*   BILITOT 0.4   ALKPHOS 60     Recent Labs     03/26/20  1332   TROPONINI 0.08*       Urinalysis:      Lab Results   Component Value Date    NITRU Negative 03/26/2020    WBCUA 0-2 01/29/2020    BACTERIA 1+ 01/29/2020    RBCUA 3-5 01/29/2020    BLOODU Negative 03/26/2020    SPECGRAV 1.015 03/26/2020    GLUCOSEU Negative 03/26/2020    GLUCOSEU Negative 12/19/2011       Radiology:  US RENAL COMPLETE   Final Result   Moderate left renal atrophy. XR CHEST PORTABLE   Final Result   Small right-sided pleural effusion with right basilar atelectasis versus mild   early developing infiltrate.              Assessment/Plan:    Active Hospital Problems    Diagnosis    Bradycardia [R00.1]       Diarrhea (resolved):

## 2020-03-29 NOTE — PROGRESS NOTES
ambulation  Current Treatment Recommendations: Strengthening, Transfer Training, Endurance Training, Cognitive Reorientation, Patient/Caregiver Education & Training, Balance Training, Gait Training, Home Exercise Program, Functional Mobility Training, Safety Education & Training  Safety Devices  Type of devices: All fall risk precautions in place, Gait belt, Patient at risk for falls, Nurse notified, Call light within reach, Bed alarm in place, Left in bed  Restraints  Initially in place: No     Therapy Time   Individual Concurrent Group Co-treatment   Time In 0944         Time Out 1007         Minutes 23         Timed Code Treatment Minutes: 23 Minutes     If pt d/c prior to next tx session, this note to serve as d/c summary.     Ignacio Armstrong, PT, DPT

## 2020-03-29 NOTE — PROGRESS NOTES
Cardiology Progress Note     Admit Date: 3/26/2020     Reason for follow up: Bradycardia. Interval History:   - Patient seen and examined. - Clinical notes reviewed. - Telemetry reviewed. Sinus bradycardia.  - No new complaints today. Did well overnight.  - No major events overnight. Physical Examination:  Vitals:    20 0749   BP: (!) 158/64   Pulse: 58   Resp: 16   Temp: 98.6 °F (37 °C)   SpO2: 98%        Intake/Output Summary (Last 24 hours) at 3/29/2020 1052  Last data filed at 3/29/2020 0826  Gross per 24 hour   Intake 2764 ml   Output 1375 ml   Net 1389 ml     In: 2404 [P.O.:720; I.V.:1684]  Out: 675    Wt Readings from Last 3 Encounters:   20 133 lb 6.4 oz (60.5 kg)   20 139 lb (63 kg)   02/10/20 147 lb (66.7 kg)     Temp  Av.5 °F (36.9 °C)  Min: 98.2 °F (36.8 °C)  Max: 98.7 °F (37.1 °C)  Pulse  Av.7  Min: 49  Max: 59  BP  Min: 98/46  Max: 158/64  SpO2  Av %  Min: 98 %  Max: 98 %    · Telemetry: Sinus bradycardia. · Constitutional: Alert. Oriented to person, place, and time. No distress. · Cardiovascular: Normal rate, regular rhythm. Normal S1&S2. · Pulmonary/Chest: Bilateral respiratory sounds present. No respiratory accessory muscle use. · Abdominal: Soft. Normal bowel sounds present. No distension, No tenderness. · Musculoskeletal: No tenderness. No edema    · Neurological: Alert and oriented. Cranial nerve II-XII grossly intact. · Skin: Skin is warm and dry. No rash, lesions, ulcerations noted. · Psychiatric: No anxiety nor agitation. Labs, diagnostic and imaging results reviewed. Reviewed.    Recent Labs     20  0534 20  0516 20  0510    148* 144   K 4.1 4.1 4.3    111* 107   CO2 21 26 25   PHOS  --  3.1 2.3*   BUN 71* 55* 44*   CREATININE 4.4* 3.2* 2.4*     Recent Labs     20  1332 20  0534   WBC 6.6 7.1   HGB 11.4* 11.1*   HCT 34.6* 33.3*   MCV 89.6 89.3   PLT 93* 94*     Lab Results   Component Value

## 2020-03-29 NOTE — PROGRESS NOTES
[facility]       Progress Note           Subjective/   82y.o. year old male who we are seeing in consultation for A/CKD. HPI:  Renal function improving with IVF's, urine output of 1.4 liters over last 24 hours. Intake better. ROS:  No sob, abdominal pain. Objective/   GEN:  Chronically ill, /66   Pulse 56   Temp 98.4 °F (36.9 °C) (Oral)   Resp 16   Ht 5' 9\" (1.753 m)   Wt 133 lb 6.4 oz (60.5 kg)   SpO2 98%   BMI 19.70 kg/m²   HEENT: non-icteric, no JVD  CV: S1, S2, bradycardic without m/r/g; no LE edema  RESP: CTA B without w/r/r; breathing wnl  ABD: +bs, soft, nt, no hsm  SKIN: warm, no rashes    Data/  Recent Labs     03/27/20  0534   WBC 7.1   HGB 11.1*   HCT 33.3*   MCV 89.3   PLT 94*     Recent Labs     03/27/20  0534 03/28/20  0516 03/29/20  0510    148* 144   K 4.1 4.1 4.3    111* 107   CO2 21 26 25   GLUCOSE 82 87 84   PHOS  --  3.1 2.3*   BUN 71* 55* 44*   CREATININE 4.4* 3.2* 2.4*   LABGLOM 13* 19* 26*   GFRAA 16* 23* 31*       Assessment/     Acute kidney injury  - Etiology: Pre-renal/Renal hypoperfusion in setting of diarrhea, hypotension, & bradycardia  - Data: SCr 4.9 on admission, UA bland, Renal US with no obstruction.   - Clinical: Slowly improving, non-oliguric     Chronic kidney disease stage 4  - Etiology:  Renovascular.  Renal US shows atrophic left kidney, Kacie Michele has a solitary right kidney   - Baseline SCr 2.2 - 2.4      Anion-gap metabolic acidosis  - Etiology: Multifactorial in setting of STEPHANIE, diarrhea  - Clinical: Resolved with IVF's with bicarbonate replacement     Systolic heart failure  - EF is 85-33%, grade 1 diastolic dysfunction, moderate AS from 2019    Hypernatremia  - Improving with hypotonic IVF's       Plan/     - Attempt to monitor off of IVF's today  - Lasix on hold  - Trend labs

## 2020-03-30 LAB
ALBUMIN SERPL-MCNC: 3.4 G/DL (ref 3.4–5)
ANION GAP SERPL CALCULATED.3IONS-SCNC: 10 MMOL/L (ref 3–16)
BUN BLDV-MCNC: 32 MG/DL (ref 7–20)
CALCIUM SERPL-MCNC: 8 MG/DL (ref 8.3–10.6)
CHLORIDE BLD-SCNC: 106 MMOL/L (ref 99–110)
CO2: 27 MMOL/L (ref 21–32)
CREAT SERPL-MCNC: 1.8 MG/DL (ref 0.8–1.3)
GFR AFRICAN AMERICAN: 44
GFR NON-AFRICAN AMERICAN: 36
GLUCOSE BLD-MCNC: 84 MG/DL (ref 70–99)
PHOSPHORUS: 1.7 MG/DL (ref 2.5–4.9)
POTASSIUM SERPL-SCNC: 4.4 MMOL/L (ref 3.5–5.1)
SODIUM BLD-SCNC: 143 MMOL/L (ref 136–145)

## 2020-03-30 PROCEDURE — 80069 RENAL FUNCTION PANEL: CPT

## 2020-03-30 PROCEDURE — 97110 THERAPEUTIC EXERCISES: CPT

## 2020-03-30 PROCEDURE — 2060000000 HC ICU INTERMEDIATE R&B

## 2020-03-30 PROCEDURE — 2580000003 HC RX 258: Performed by: INTERNAL MEDICINE

## 2020-03-30 PROCEDURE — 97530 THERAPEUTIC ACTIVITIES: CPT

## 2020-03-30 PROCEDURE — 36415 COLL VENOUS BLD VENIPUNCTURE: CPT

## 2020-03-30 PROCEDURE — 2500000003 HC RX 250 WO HCPCS: Performed by: INTERNAL MEDICINE

## 2020-03-30 PROCEDURE — 6360000002 HC RX W HCPCS: Performed by: INTERNAL MEDICINE

## 2020-03-30 PROCEDURE — 6370000000 HC RX 637 (ALT 250 FOR IP): Performed by: NURSE PRACTITIONER

## 2020-03-30 PROCEDURE — 99232 SBSQ HOSP IP/OBS MODERATE 35: CPT | Performed by: NURSE PRACTITIONER

## 2020-03-30 PROCEDURE — 2580000003 HC RX 258

## 2020-03-30 PROCEDURE — 6370000000 HC RX 637 (ALT 250 FOR IP): Performed by: INTERNAL MEDICINE

## 2020-03-30 RX ORDER — SODIUM CHLORIDE 9 MG/ML
INJECTION, SOLUTION INTRAVENOUS
Status: COMPLETED
Start: 2020-03-30 | End: 2020-03-30

## 2020-03-30 RX ADMIN — SODIUM CHLORIDE 250 ML: 9 INJECTION, SOLUTION INTRAVENOUS at 14:02

## 2020-03-30 RX ADMIN — SODIUM PHOSPHATE, MONOBASIC, MONOHYDRATE 20 MMOL: 276; 142 INJECTION, SOLUTION INTRAVENOUS at 14:02

## 2020-03-30 RX ADMIN — FLUOXETINE 20 MG: 20 CAPSULE ORAL at 09:49

## 2020-03-30 RX ADMIN — Medication 10 ML: at 09:49

## 2020-03-30 RX ADMIN — Medication 10 ML: at 20:39

## 2020-03-30 RX ADMIN — ATORVASTATIN CALCIUM 20 MG: 10 TABLET, FILM COATED ORAL at 09:48

## 2020-03-30 RX ADMIN — METOPROLOL SUCCINATE 12.5 MG: 25 TABLET, EXTENDED RELEASE ORAL at 20:38

## 2020-03-30 RX ADMIN — HEPARIN SODIUM 5000 UNITS: 5000 INJECTION INTRAVENOUS; SUBCUTANEOUS at 21:29

## 2020-03-30 RX ADMIN — LEVOTHYROXINE SODIUM 175 MCG: 150 TABLET ORAL at 05:07

## 2020-03-30 RX ADMIN — ASPIRIN 81 MG: 81 TABLET, COATED ORAL at 09:48

## 2020-03-30 ASSESSMENT — PAIN SCALES - GENERAL
PAINLEVEL_OUTOF10: 0

## 2020-03-30 NOTE — PROGRESS NOTES
Physical Therapy  Facility/Department: [facility] C4 PCU  Daily Treatment Note  NAME: Merissa Lawler  : 1935  MRN: [id number]    Date of Service: 3/30/2020    Discharge Recommendations:  Subacute/Skilled Nursing Facility   PT Equipment Recommendations  Equipment Needed: No    Assessment   Body structures, Functions, Activity limitations: Decreased functional mobility ; Decreased safe awareness;Decreased balance;Decreased ADL status; Decreased cognition;Decreased endurance;Decreased posture  Assessment: Pt was able to complete bed mobility with supervision. Ambulation required CGA-min assist due to LOB. Needed increased cues at the end for walker management. Positioned in the chair at end of session. Recommend SNF to improve safety with mobility   Treatment Diagnosis: decreased independence with gait  Specific instructions for Next Treatment: progress ambulation  Prognosis: Good  Decision Making: Medium Complexity  PT Education: Goals;Precautions; Functional Mobility Training;PT Role;Transfer Training;Plan of Care;Gait Training;General Safety  Patient Education: pt requires reinforcement  Barriers to Learning: dementia  REQUIRES PT FOLLOW UP: Yes  Activity Tolerance  Activity Tolerance: Patient limited by fatigue;Patient Tolerated treatment well     Patient Diagnosis(es): The primary encounter diagnosis was Acute renal failure superimposed on chronic kidney disease, unspecified CKD stage, unspecified acute renal failure type ([facility] [address] A diagnosis of Diarrhea, unspecified type was also pertinent to this visit. has a past medical history of CAD (coronary artery disease), Cognitive impairment, Essential hypertension, benign, Hypothyroid, PAD (peripheral artery disease), and Pure hypercholesterolemia. has a past surgical history that includes knee surgery (Left) and Coronary artery bypass graft.     Restrictions  Restrictions/Precautions  Restrictions/Precautions: Fall Risk  Position Activity Restriction  Other

## 2020-03-30 NOTE — PROGRESS NOTES
[facility]       Progress Note           Subjective/   82y.o. year old male who we are seeing in consultation for A/CKD. HPI:  Renal function improving with IVF's, urine output of 1.2 liters over last 24 hours. Intake better. ROS:  No sob, abdominal pain. Objective/   GEN:  Chronically ill, /67   Pulse 61   Temp 98.5 °F (36.9 °C) (Oral)   Resp 16   Ht 5' 9\" (1.753 m)   Wt 133 lb 6.4 oz (60.5 kg)   SpO2 98%   BMI 19.70 kg/m²   HEENT: non-icteric, no JVD  CV: S1, S2, bradycardic without m/r/g; no LE edema  RESP: CTA B without w/r/r; breathing wnl  ABD: +bs, soft, nt, no hsm  SKIN: warm, no rashes    Data/  No results for input(s): WBC, HGB, HCT, MCV, PLT in the last 72 hours. Recent Labs     03/28/20  0516 03/29/20  0510 03/30/20  0435   * 144 143   K 4.1 4.3 4.4   * 107 106   CO2 26 25 27   GLUCOSE 87 84 84   PHOS 3.1 2.3* 1.7*   BUN 55* 44* 32*   CREATININE 3.2* 2.4* 1.8*   LABGLOM 19* 26* 36*   GFRAA 23* 31* 44*       Assessment/     Acute kidney injury  - Etiology: Pre-renal/Renal hypoperfusion in setting of diarrhea, hypotension, & bradycardia  - Data: SCr 4.9 on admission, UA bland, Renal US with no obstruction.   - Clinical: Slowly improving, non-oliguric     Chronic kidney disease stage 4  - Etiology:  Renovascular.  Renal US shows atrophic left kidney, Abraham Suero has a solitary right kidney   - Baseline SCr 2.2 - 2.4      Anion-gap metabolic acidosis  - Etiology: Multifactorial in setting of STEPHANIE, diarrhea  - Clinical: Resolved with IVF's with bicarbonate replacement     Systolic heart failure/Bradycardia  - EF is 25-77%, grade 1 diastolic dysfunction, moderate AS from 2019  - Plans per Cardiology    Hypernatremia  - Improving with hypotonic IVF's    Hypophosphatemia  - Prn replacement       Plan/     - Monitoring off of IVF's since 3/29, intake better  - Lasix on hold, favor to hold on discharge  - Trend labs

## 2020-03-30 NOTE — CARE COORDINATION
Spoke to pt's son Talib Gorman and per his request a referral was made to [facility]. Spoke to the [facility] and they do not accept this pt's insurance. Will reach out to pt's son for second choice.

## 2020-03-30 NOTE — PROGRESS NOTES
The following information also communicated via sticky note to MD: \"Metoprolol not given last night. HR in the 50's. Patient not comfortable taking this medication while already bradycardic. BP also soft overnight @ 113/59, 110/54. \"

## 2020-03-30 NOTE — PROGRESS NOTES
Hospitalist Progress Note      PCP: Marquita Echols MD    Date of Admission: 3/26/2020    Chief Complaint: Diarrhea, bradycardia    Hospital Course:   Patient is an 72-year-old male with a past medical history of hypertension, hyperlipidemia, coronary artery disease who presents with chills, diarrhea and found to be bradycardic. Patient reports that he feels chilled but denies any fever. Staff in his facility found him covered in diarrhea this morning. When EMS arrived they found his pulse to be in the 30s to 40s and his blood pressure to be 70/50. Patient was given atropine and seemed to respond however patient has maintained heart rate in the upper 50s with a blood pressure in the 120s since administration. Patient denies any abdominal pain. He reports nausea but denies any vomiting. Subjective: Feels much better. Cr improved. Tells me he is \"eating everything he can find\". Medications:  Reviewed    Infusion Medications     Scheduled Medications    sodium phosphate IVPB  20 mmol Intravenous Once    metoprolol succinate  12.5 mg Oral Nightly    aspirin EC  81 mg Oral Daily    FLUoxetine  20 mg Oral Daily    atorvastatin  20 mg Oral Daily    sodium chloride flush  10 mL Intravenous 2 times per day    heparin (porcine)  5,000 Units Subcutaneous 3 times per day    levothyroxine  175 mcg Oral Daily     PRN Meds: sodium chloride flush, acetaminophen **OR** acetaminophen, promethazine **OR** [DISCONTINUED] ondansetron      Intake/Output Summary (Last 24 hours) at 3/30/2020 1703  Last data filed at 3/30/2020 1427  Gross per 24 hour   Intake 720 ml   Output 650 ml   Net 70 ml       Physical Exam Performed:    BP (!) 154/69   Pulse (!) 48   Temp 98.5 °F (36.9 °C) (Oral)   Resp 18   Ht 5' 9\" (1.753 m)   Wt 133 lb 6.4 oz (60.5 kg)   SpO2 98%   BMI 19.70 kg/m²     General appearance: Elderly male in no apparent distress, appears stated age and cooperative.   HEENT: Pupils equal, round, and reactive

## 2020-03-30 NOTE — PROGRESS NOTES
offered  General Comment  Comments: RN cleared pt for OT  Vital Signs  Patient Currently in Pain: Denies   Orientation  Orientation  Overall Orientation Status: Within Functional Limits  Objective    ADL  Grooming: Contact guard assistance(standing at sink to wash hands)  LE Dressing: Contact guard assistance(standing; independent sitting in chair to doff/apurva socks)  Toileting: Contact guard assistance        Balance  Sitting Balance: Supervision  Standing Balance: Contact guard assistance(with RW)  Standing Balance  Time: 1-4 minutes   Activity: bathroom & functional mobility in hallway  Functional Mobility  Functional - Mobility Device: Rolling Walker  Activity: To/from bathroom  Assist Level: Contact guard assistance  Toilet Transfers  Toilet - Technique: Ambulating(CGA with RW)  Equipment Used: Grab bars  Toilet Transfer: Contact guard assistance(mod cues for safety)  Bed mobility  Supine to Sit: Unable to assess(already up in chair)  Sit to Supine: Contact guard assistance  Transfers  Sit to stand: Contact guard assistance  Stand to sit: Contact guard assistance  Transfer Comments: mod cues for safe hand placement stand-->sit           Cognition  Overall Cognitive Status: Exceptions  Arousal/Alertness: Appropriate responses to stimuli  Following Commands:  Follows one step commands consistently  Attention Span: Appears intact  Memory: Decreased long term memory;Decreased recall of precautions;Decreased short term memory  Safety Judgement: Decreased awareness of need for safety  Insights: Decreased awareness of deficits  Initiation: Requires cues for some  Sequencing: Requires cues for some       Type of ROM/Therapeutic Exercise: AROM BUE  Hand flex/ext: x  15  Reps  Elbow flex/ext:  x  15  Reps  Forearm sup/pron:  x  15  Reps  Shld flex/ext:  x   12 Reps    LUE AROM : WFL  RUE AROM : WFL     Plan   Plan  Times per week: 3-5x/ week   Current Treatment Recommendations: Strengthening, Functional Mobility Training,

## 2020-03-30 NOTE — FLOWSHEET NOTE
03/30/20 0800   Assessment   Charting Type Shift assessment   Neurological   Neuro (WDL) X   Level of Consciousness 0   Orientation Level Oriented X4   Cognition Appropriate judgement; Appropriate safety awareness; Appropriate attention/concentration; Appropriate for developmental age; Follows commands   Adore Coma Scale   Eye Opening 4   Best Verbal Response 5   Best Motor Response 6   Adore Coma Scale Score 15   HEENT   HEENT (WDL) WDL   Respiratory   Respiratory (WDL) X   Respiratory Pattern Regular   Respiratory Depth Normal   Respiratory Quality/Effort Unlabored   Chest Assessment Chest expansion symmetrical   L Breath Sounds Diminished   R Breath Sounds Diminished   Breath Sounds   Right Upper Lobe Diminished   Right Middle Lobe Diminished   Right Lower Lobe Diminished   Left Upper Lobe Diminished   Left Lower Lobe Diminished   Cardiac   Cardiac (WDL) X   Cardiac Regularity Regular   Heart Sounds S1, S2   Cardiac Rhythm SB   Cardiac Monitor   Telemetry Monitor On Yes   Telemetry Audible Yes   Telemetry Alarms Set Yes   Telemetry Box Number 18   Gastrointestinal   Abdominal (WDL) X   Abdomen Inspection Soft   Tenderness Soft;Nontender   RUQ Bowel Sounds Active   LUQ Bowel Sounds Active   RLQ Bowel Sounds Active   LLQ Bowel Sounds Active   Peripheral Vascular   Peripheral Vascular (WDL) WDL   Skin Color/Condition   Skin Color/Condition (WDL) WDL   Skin Integrity   Skin Integrity (WDL) X   Musculoskeletal   Musculoskeletal (WDL) X   RUE Full movement   LUE Full movement   RL Extremity Weakness   LL Extremity Weakness   Genitourinary   Genitourinary (WDL) WDL   Anus/Rectum   Anus/Rectum (WDL) WDL   Psychosocial   Psychosocial (WDL) WDL

## 2020-03-31 VITALS
HEART RATE: 55 BPM | OXYGEN SATURATION: 97 % | WEIGHT: 133.4 LBS | RESPIRATION RATE: 16 BRPM | SYSTOLIC BLOOD PRESSURE: 155 MMHG | HEIGHT: 69 IN | TEMPERATURE: 98.3 F | DIASTOLIC BLOOD PRESSURE: 74 MMHG | BODY MASS INDEX: 19.76 KG/M2

## 2020-03-31 PROCEDURE — 97535 SELF CARE MNGMENT TRAINING: CPT

## 2020-03-31 PROCEDURE — 6370000000 HC RX 637 (ALT 250 FOR IP): Performed by: INTERNAL MEDICINE

## 2020-03-31 PROCEDURE — 6360000002 HC RX W HCPCS: Performed by: INTERNAL MEDICINE

## 2020-03-31 PROCEDURE — 97530 THERAPEUTIC ACTIVITIES: CPT

## 2020-03-31 PROCEDURE — 97110 THERAPEUTIC EXERCISES: CPT

## 2020-03-31 PROCEDURE — 2580000003 HC RX 258: Performed by: INTERNAL MEDICINE

## 2020-03-31 RX ORDER — METOPROLOL SUCCINATE 25 MG/1
12.5 TABLET, EXTENDED RELEASE ORAL NIGHTLY
Qty: 30 TABLET | Refills: 3 | Status: ON HOLD
Start: 2020-03-31 | End: 2020-06-01 | Stop reason: HOSPADM

## 2020-03-31 RX ADMIN — LEVOTHYROXINE SODIUM 175 MCG: 150 TABLET ORAL at 06:57

## 2020-03-31 RX ADMIN — ASPIRIN 81 MG: 81 TABLET, COATED ORAL at 08:35

## 2020-03-31 RX ADMIN — Medication 10 ML: at 08:36

## 2020-03-31 RX ADMIN — ATORVASTATIN CALCIUM 20 MG: 10 TABLET, FILM COATED ORAL at 08:35

## 2020-03-31 RX ADMIN — FLUOXETINE 20 MG: 20 CAPSULE ORAL at 08:36

## 2020-03-31 RX ADMIN — HEPARIN SODIUM 5000 UNITS: 5000 INJECTION INTRAVENOUS; SUBCUTANEOUS at 06:57

## 2020-03-31 ASSESSMENT — PAIN SCALES - GENERAL
PAINLEVEL_OUTOF10: 0
PAINLEVEL_OUTOF10: 0

## 2020-03-31 NOTE — PROGRESS NOTES
Occupational Therapy  Facility/Department: [facility] C4 PCU  Daily Treatment Note  NAME: Carlos Moreira  : 1935  MRN: [id number]    Date of Service: 3/31/2020    Discharge Recommendations:  Subacute/Skilled Nursing Facility  OT Equipment Recommendations  Other: defer    Assessment   Performance deficits / Impairments: Decreased functional mobility ; Decreased balance;Decreased cognition;Decreased ADL status; Decreased high-level IADLs;Decreased safe awareness    Assessment: Pt pleasant and agreeable to therapy. Pt continues to require cues for safety awareness. Pt demo'd multiple LOB in bathroom requiring min A to correct. Pt educated on energy conservation with all functional tasks. Pt continues to fatigue with exercise and would benefit from continued therapy. Cont with POC. Prognosis: Good  OT Education: OT Role;IADL Safety  REQUIRES OT FOLLOW UP: Yes  Activity Tolerance  Activity Tolerance: Patient Tolerated treatment well  Safety Devices  Safety Devices in place: Yes  Type of devices: Bed alarm in place;Call light within reach; Left in bed;Nurse notified;Gait belt         Patient Diagnosis(es): The primary encounter diagnosis was Acute renal failure superimposed on chronic kidney disease, unspecified CKD stage, unspecified acute renal failure type ([facility] [address] A diagnosis of Diarrhea, unspecified type was also pertinent to this visit. has a past medical history of CAD (coronary artery disease), Cognitive impairment, Essential hypertension, benign, Hypothyroid, PAD (peripheral artery disease), and Pure hypercholesterolemia. has a past surgical history that includes knee surgery (Left) and Coronary artery bypass graft. Restrictions  Restrictions/Precautions  Restrictions/Precautions: Fall Risk  Position Activity Restriction  Other position/activity restrictions:  Up with assist,      Subjective   General  Chart Reviewed: Yes  Patient assessed for rehabilitation services?: Yes  Additional Pertinent Hx:

## 2020-03-31 NOTE — PROGRESS NOTES
GLUCOSE 84 84   PHOS 2.3* 1.7*   BUN 44* 32*   CREATININE 2.4* 1.8*   LABGLOM 26* 36*   GFRAA 31* 44*           Assessment/Plan:    Acute kidney injury.  - Etiology: Pre-renal/Renal hypoperfusion in setting of diarrhea, hypotension, & bradycardia  - Data: SCr 4.9 on admission, UA bland, Renal US with no obstruction.  - Clinical: Improving, non-oliguric, creatinine is back to baseline.     Chronic kidney disease stage 4.  - Etiology:  Renovascular.  Renal US shows atrophic left kidney, Amy Del Rio has a solitary right kidney   - Baseline SCr 2.2 - 2.4      Anion-gap metabolic acidosis. - Etiology: Multifactorial in setting of STEPHANIE, diarrhea  - Clinical: Resolved with IVF's with bicarbonate replacement     Systolic heart failure/Bradycardia. - TK SO 67-34%, grade 1 diastolic dysfunction, moderate AS from 2019  - Plans per Cardiology     Hypernatremia.  - Improved.     Hypophosphatemia. - Prn replacement. Please do not hesitate to contact me at [phone] if with questions. Thank you!     Rae Acuña MD  3/31/2020  [facility]

## 2020-04-01 NOTE — DISCHARGE SUMMARY
40 MG tablet TAKE 1 TABLET BY MOUTH EVERY DAY, Disp-90 tablet, R-3Normal      donepezil (ARICEPT) 10 MG tablet TAKE 1 TABLET BY MOUTH EVERY NIGHT, Disp-90 tablet, R-3Normal      fluticasone (FLONASE) 50 MCG/ACT nasal spray SHAKE LIQUID AND USE 1 SPRAY IN EACH NOSTRIL DAILY, Disp-1 Bottle, R-2**Patient requests 90 days supply**Normal      senna-docusate (PERICOLACE) 8.6-50 MG per tablet TAKE 2 TABLETS BY MOUTH DAILY AS NEEDED FOR CONSTIPATION, Disp-60 tablet, R-0Normal      loratadine (CLARITIN) 10 MG tablet Take 1 tablet by mouth daily, Disp-30 tablet, R-5      aspirin EC 81 MG EC tablet Take 1 tablet by mouth daily, Disp-30 tablet, R-3             Time Spent on discharge is more than 30 minutes in the examination, evaluation, counseling and review of medications and discharge plan. Signed:    TREVOR Winchester - CNP   4/1/2020      Thank you Michael James MD for the opportunity to be involved in this patient's care. If you have any questions or concerns please feel free to contact me at [phone].

## 2020-04-14 ENCOUNTER — CARE COORDINATION (OUTPATIENT)
Dept: CASE MANAGEMENT | Age: 85
End: 2020-04-14

## 2020-04-15 ENCOUNTER — CARE COORDINATION (OUTPATIENT)
Dept: CASE MANAGEMENT | Age: 85
End: 2020-04-15

## 2020-04-15 PROCEDURE — 1111F DSCHRG MED/CURRENT MED MERGE: CPT | Performed by: FAMILY MEDICINE

## 2020-04-15 NOTE — CARE COORDINATION
[facility] Transitions Initial Follow Up Call    Call within 2 business days of discharge: Yes    Patient: Guerline Michelle Patient : 1935   MRN: [id number]  Reason for Admission: ARF  Discharge Date: 3/31/20 RARS: Readmission Risk Score: 28      Last Discharge [facility]       Complaint Diagnosis Description Type Department Provider    3/26/20 Bradycardia; Hypotension Acute renal failure superimposed on chronic kidney disease, unspecified CKD stage, unspecified acute renal failure type ([facility] [address] . .. ED to Hosp-Admission (Discharged) ([facility]) Nenita Land MD; Petros Mclaughlin. .. Spoke with: Helen Lugo    Facility: [facility]    Non-face-to-face services provided:  Obtained and reviewed discharge summary and/or continuity of care documents    Received an incoming call from daughter in law and caregiver Dagmar Flores. Patient's sons do not have any contact with the patient. Will rai has taken responsibility for decisions. Will rai states she dropped patient off at the [facility] day of discharge. She was not allowed at the hospital or Independent living facility d/t Covid 19 pandemic. As far as she knows he is doing okay. [facility] administers medications. Patient has a walker and scooter to aid in ambulation  Per Will rai prior to patient's admission patient refused to eat. She said he stated he wanted to die. He was placed on an anti-depressant. Will rai expressed not knowing her next steps. Provided education on options assisted living, LTC or hospice when there is no other options. She expressed satisfaction with snf regarding another family member. This CTN suggested she contact admissions to explore options and proceed with obtaining the procedure for financial responsibility. Offer assistance with future guidance with concerns. No other concerns at this time. Follow Up  No future appointments.     Mandy Cha RN

## 2020-05-06 ENCOUNTER — HOSPITAL ENCOUNTER (INPATIENT)
Age: 85
LOS: 6 days | Discharge: NURSING, REHAB OR LONG-TERM CARE | DRG: 682 | End: 2020-05-12
Attending: EMERGENCY MEDICINE | Admitting: INTERNAL MEDICINE
Payer: MEDICARE

## 2020-05-06 ENCOUNTER — APPOINTMENT (OUTPATIENT)
Dept: GENERAL RADIOLOGY | Age: 85
DRG: 682 | End: 2020-05-06
Payer: MEDICARE

## 2020-05-06 PROBLEM — N17.9 AKI (ACUTE KIDNEY INJURY) (HCC): Status: ACTIVE | Noted: 2020-05-06

## 2020-05-06 LAB
A/G RATIO: 1.5 (ref 1.1–2.2)
ALBUMIN SERPL-MCNC: 3.7 G/DL (ref 3.4–5)
ALP BLD-CCNC: 60 U/L (ref 40–129)
ALT SERPL-CCNC: <5 U/L (ref 10–40)
ANION GAP SERPL CALCULATED.3IONS-SCNC: 16 MMOL/L (ref 3–16)
AST SERPL-CCNC: 11 U/L (ref 15–37)
BASOPHILS ABSOLUTE: 0.1 K/UL (ref 0–0.2)
BASOPHILS RELATIVE PERCENT: 1 %
BILIRUB SERPL-MCNC: 0.5 MG/DL (ref 0–1)
BILIRUBIN URINE: NEGATIVE
BLOOD, URINE: NEGATIVE
BUN BLDV-MCNC: 117 MG/DL (ref 7–20)
CALCIUM SERPL-MCNC: 8.2 MG/DL (ref 8.3–10.6)
CHLORIDE BLD-SCNC: 105 MMOL/L (ref 99–110)
CLARITY: CLEAR
CO2: 21 MMOL/L (ref 21–32)
COLOR: YELLOW
CREAT SERPL-MCNC: 7.8 MG/DL (ref 0.8–1.3)
EOSINOPHILS ABSOLUTE: 0.3 K/UL (ref 0–0.6)
EOSINOPHILS RELATIVE PERCENT: 4.2 %
GFR AFRICAN AMERICAN: 8
GFR NON-AFRICAN AMERICAN: 7
GLOBULIN: 2.4 G/DL
GLUCOSE BLD-MCNC: 87 MG/DL (ref 70–99)
GLUCOSE URINE: NEGATIVE MG/DL
HCT VFR BLD CALC: 31 % (ref 40.5–52.5)
HEMOGLOBIN: 10.4 G/DL (ref 13.5–17.5)
KETONES, URINE: NEGATIVE MG/DL
LEUKOCYTE ESTERASE, URINE: NEGATIVE
LYMPHOCYTES ABSOLUTE: 1.1 K/UL (ref 1–5.1)
LYMPHOCYTES RELATIVE PERCENT: 16.2 %
MCH RBC QN AUTO: 30.8 PG (ref 26–34)
MCHC RBC AUTO-ENTMCNC: 33.6 G/DL (ref 31–36)
MCV RBC AUTO: 91.5 FL (ref 80–100)
MICROSCOPIC EXAMINATION: NORMAL
MONOCYTES ABSOLUTE: 0.4 K/UL (ref 0–1.3)
MONOCYTES RELATIVE PERCENT: 5.7 %
NEUTROPHILS ABSOLUTE: 4.9 K/UL (ref 1.7–7.7)
NEUTROPHILS RELATIVE PERCENT: 72.9 %
NITRITE, URINE: NEGATIVE
PDW BLD-RTO: 17.4 % (ref 12.4–15.4)
PH UA: 5.5 (ref 5–8)
PLATELET # BLD: 84 K/UL (ref 135–450)
PLATELET SLIDE REVIEW: ABNORMAL
PMV BLD AUTO: 9 FL (ref 5–10.5)
POTASSIUM SERPL-SCNC: 5.3 MMOL/L (ref 3.5–5.1)
PROTEIN UA: NEGATIVE MG/DL
RBC # BLD: 3.39 M/UL (ref 4.2–5.9)
SLIDE REVIEW: ABNORMAL
SODIUM BLD-SCNC: 142 MMOL/L (ref 136–145)
SPECIFIC GRAVITY UA: 1.02 (ref 1–1.03)
TOTAL PROTEIN: 6.1 G/DL (ref 6.4–8.2)
TROPONIN: 0.09 NG/ML
URINE TYPE: NORMAL
UROBILINOGEN, URINE: 0.2 E.U./DL
WBC # BLD: 6.7 K/UL (ref 4–11)

## 2020-05-06 PROCEDURE — 85025 COMPLETE CBC W/AUTO DIFF WBC: CPT

## 2020-05-06 PROCEDURE — 1200000000 HC SEMI PRIVATE

## 2020-05-06 PROCEDURE — 2580000003 HC RX 258: Performed by: INTERNAL MEDICINE

## 2020-05-06 PROCEDURE — 71046 X-RAY EXAM CHEST 2 VIEWS: CPT

## 2020-05-06 PROCEDURE — 93005 ELECTROCARDIOGRAM TRACING: CPT | Performed by: EMERGENCY MEDICINE

## 2020-05-06 PROCEDURE — 99285 EMERGENCY DEPT VISIT HI MDM: CPT

## 2020-05-06 PROCEDURE — 51702 INSERT TEMP BLADDER CATH: CPT

## 2020-05-06 PROCEDURE — 81003 URINALYSIS AUTO W/O SCOPE: CPT

## 2020-05-06 PROCEDURE — 80053 COMPREHEN METABOLIC PANEL: CPT

## 2020-05-06 PROCEDURE — 6370000000 HC RX 637 (ALT 250 FOR IP): Performed by: INTERNAL MEDICINE

## 2020-05-06 PROCEDURE — 84484 ASSAY OF TROPONIN QUANT: CPT

## 2020-05-06 RX ORDER — ONDANSETRON 2 MG/ML
4 INJECTION INTRAMUSCULAR; INTRAVENOUS EVERY 6 HOURS PRN
Status: DISCONTINUED | OUTPATIENT
Start: 2020-05-06 | End: 2020-05-06 | Stop reason: ALTCHOICE

## 2020-05-06 RX ORDER — FLUOXETINE HYDROCHLORIDE 20 MG/1
20 CAPSULE ORAL DAILY
Status: DISCONTINUED | OUTPATIENT
Start: 2020-05-07 | End: 2020-05-12 | Stop reason: HOSPADM

## 2020-05-06 RX ORDER — SODIUM POLYSTYRENE SULFONATE 15 G/60ML
15 SUSPENSION ORAL; RECTAL ONCE
Status: COMPLETED | OUTPATIENT
Start: 2020-05-06 | End: 2020-05-06

## 2020-05-06 RX ORDER — SODIUM CHLORIDE 0.9 % (FLUSH) 0.9 %
10 SYRINGE (ML) INJECTION PRN
Status: DISCONTINUED | OUTPATIENT
Start: 2020-05-06 | End: 2020-05-12 | Stop reason: HOSPADM

## 2020-05-06 RX ORDER — METOPROLOL SUCCINATE 25 MG/1
12.5 TABLET, EXTENDED RELEASE ORAL NIGHTLY
Status: DISCONTINUED | OUTPATIENT
Start: 2020-05-06 | End: 2020-05-12 | Stop reason: HOSPADM

## 2020-05-06 RX ORDER — ACETAMINOPHEN 650 MG/1
650 SUPPOSITORY RECTAL EVERY 6 HOURS PRN
Status: DISCONTINUED | OUTPATIENT
Start: 2020-05-06 | End: 2020-05-12 | Stop reason: HOSPADM

## 2020-05-06 RX ORDER — ATORVASTATIN CALCIUM 10 MG/1
20 TABLET, FILM COATED ORAL DAILY
Status: DISCONTINUED | OUTPATIENT
Start: 2020-05-07 | End: 2020-05-12 | Stop reason: HOSPADM

## 2020-05-06 RX ORDER — PROMETHAZINE HYDROCHLORIDE 25 MG/1
12.5 TABLET ORAL EVERY 6 HOURS PRN
Status: DISCONTINUED | OUTPATIENT
Start: 2020-05-06 | End: 2020-05-12 | Stop reason: HOSPADM

## 2020-05-06 RX ORDER — ACETAMINOPHEN 325 MG/1
650 TABLET ORAL EVERY 6 HOURS PRN
Status: DISCONTINUED | OUTPATIENT
Start: 2020-05-06 | End: 2020-05-12 | Stop reason: HOSPADM

## 2020-05-06 RX ORDER — SODIUM CHLORIDE 0.9 % (FLUSH) 0.9 %
10 SYRINGE (ML) INJECTION EVERY 12 HOURS SCHEDULED
Status: DISCONTINUED | OUTPATIENT
Start: 2020-05-06 | End: 2020-05-12 | Stop reason: HOSPADM

## 2020-05-06 RX ORDER — MEMANTINE HYDROCHLORIDE 5 MG/1
5 TABLET ORAL 2 TIMES DAILY
Status: DISCONTINUED | OUTPATIENT
Start: 2020-05-06 | End: 2020-05-12 | Stop reason: HOSPADM

## 2020-05-06 RX ORDER — SODIUM CHLORIDE 9 MG/ML
INJECTION, SOLUTION INTRAVENOUS CONTINUOUS
Status: DISCONTINUED | OUTPATIENT
Start: 2020-05-06 | End: 2020-05-07

## 2020-05-06 RX ORDER — SODIUM CHLORIDE 9 MG/ML
1000 INJECTION, SOLUTION INTRAVENOUS CONTINUOUS
Status: DISCONTINUED | OUTPATIENT
Start: 2020-05-06 | End: 2020-05-07

## 2020-05-06 RX ORDER — LEVOTHYROXINE SODIUM 175 UG/1
1 TABLET ORAL DAILY
Status: DISCONTINUED | OUTPATIENT
Start: 2020-05-07 | End: 2020-05-06 | Stop reason: CLARIF

## 2020-05-06 RX ORDER — DONEPEZIL HYDROCHLORIDE 5 MG/1
10 TABLET, FILM COATED ORAL NIGHTLY
Status: DISCONTINUED | OUTPATIENT
Start: 2020-05-06 | End: 2020-05-12 | Stop reason: HOSPADM

## 2020-05-06 RX ADMIN — DONEPEZIL HYDROCHLORIDE 10 MG: 5 TABLET, FILM COATED ORAL at 23:46

## 2020-05-06 RX ADMIN — SODIUM CHLORIDE: 9 INJECTION, SOLUTION INTRAVENOUS at 21:28

## 2020-05-06 RX ADMIN — SODIUM POLYSTYRENE SULFONATE 15 G: 15 SUSPENSION ORAL; RECTAL at 23:46

## 2020-05-06 RX ADMIN — MEMANTINE 5 MG: 5 TABLET ORAL at 23:46

## 2020-05-06 NOTE — ED NOTES
Bed: 04  Expected date:   Expected time:   Means of arrival:   Comments:  Ash, [facility]  05/06/20 5019

## 2020-05-07 PROBLEM — F03.90 DEMENTIA (HCC): Status: ACTIVE | Noted: 2020-05-07

## 2020-05-07 PROBLEM — I10 HTN (HYPERTENSION): Status: ACTIVE | Noted: 2020-05-07

## 2020-05-07 PROBLEM — N17.9 ARF (ACUTE RENAL FAILURE) (HCC): Status: ACTIVE | Noted: 2020-05-07

## 2020-05-07 PROBLEM — E78.5 HYPERLIPIDEMIA: Status: ACTIVE | Noted: 2020-05-07

## 2020-05-07 PROBLEM — E44.1 MILD MALNUTRITION (HCC): Chronic | Status: ACTIVE | Noted: 2020-05-07

## 2020-05-07 LAB
ALBUMIN SERPL-MCNC: 3.8 G/DL (ref 3.4–5)
ANION GAP SERPL CALCULATED.3IONS-SCNC: 18 MMOL/L (ref 3–16)
BUN BLDV-MCNC: 114 MG/DL (ref 7–20)
CALCIUM SERPL-MCNC: 8.1 MG/DL (ref 8.3–10.6)
CHLORIDE BLD-SCNC: 108 MMOL/L (ref 99–110)
CO2: 22 MMOL/L (ref 21–32)
CREAT SERPL-MCNC: 7.4 MG/DL (ref 0.8–1.3)
EKG ATRIAL RATE: 46 BPM
EKG DIAGNOSIS: NORMAL
EKG P AXIS: 76 DEGREES
EKG P-R INTERVAL: 284 MS
EKG Q-T INTERVAL: 592 MS
EKG QRS DURATION: 164 MS
EKG QTC CALCULATION (BAZETT): 518 MS
EKG R AXIS: -1 DEGREES
EKG T AXIS: 104 DEGREES
EKG VENTRICULAR RATE: 46 BPM
GFR AFRICAN AMERICAN: 9
GFR NON-AFRICAN AMERICAN: 7
GLUCOSE BLD-MCNC: 85 MG/DL (ref 70–99)
PHOSPHORUS: 6.2 MG/DL (ref 2.5–4.9)
POTASSIUM SERPL-SCNC: 4.9 MMOL/L (ref 3.5–5.1)
SODIUM BLD-SCNC: 148 MMOL/L (ref 136–145)

## 2020-05-07 PROCEDURE — 36415 COLL VENOUS BLD VENIPUNCTURE: CPT

## 2020-05-07 PROCEDURE — 2580000003 HC RX 258: Performed by: INTERNAL MEDICINE

## 2020-05-07 PROCEDURE — 1200000000 HC SEMI PRIVATE

## 2020-05-07 PROCEDURE — 6370000000 HC RX 637 (ALT 250 FOR IP): Performed by: INTERNAL MEDICINE

## 2020-05-07 PROCEDURE — 93010 ELECTROCARDIOGRAM REPORT: CPT | Performed by: INTERNAL MEDICINE

## 2020-05-07 PROCEDURE — 80069 RENAL FUNCTION PANEL: CPT

## 2020-05-07 RX ORDER — SODIUM CHLORIDE, SODIUM LACTATE, POTASSIUM CHLORIDE, CALCIUM CHLORIDE 600; 310; 30; 20 MG/100ML; MG/100ML; MG/100ML; MG/100ML
INJECTION, SOLUTION INTRAVENOUS CONTINUOUS
Status: DISCONTINUED | OUTPATIENT
Start: 2020-05-07 | End: 2020-05-10

## 2020-05-07 RX ADMIN — PROMETHAZINE HYDROCHLORIDE 12.5 MG: 25 TABLET ORAL at 22:05

## 2020-05-07 RX ADMIN — METOPROLOL SUCCINATE 12.5 MG: 25 TABLET, EXTENDED RELEASE ORAL at 19:53

## 2020-05-07 RX ADMIN — MEMANTINE 5 MG: 5 TABLET ORAL at 19:52

## 2020-05-07 RX ADMIN — SODIUM CHLORIDE, POTASSIUM CHLORIDE, SODIUM LACTATE AND CALCIUM CHLORIDE: 600; 310; 30; 20 INJECTION, SOLUTION INTRAVENOUS at 17:16

## 2020-05-07 RX ADMIN — MEMANTINE 5 MG: 5 TABLET ORAL at 08:43

## 2020-05-07 RX ADMIN — SODIUM CHLORIDE: 9 INJECTION, SOLUTION INTRAVENOUS at 06:15

## 2020-05-07 RX ADMIN — ATORVASTATIN CALCIUM 20 MG: 10 TABLET, FILM COATED ORAL at 08:43

## 2020-05-07 RX ADMIN — FLUOXETINE 20 MG: 20 CAPSULE ORAL at 08:43

## 2020-05-07 RX ADMIN — DONEPEZIL HYDROCHLORIDE 10 MG: 5 TABLET, FILM COATED ORAL at 19:53

## 2020-05-07 ASSESSMENT — PAIN SCALES - GENERAL
PAINLEVEL_OUTOF10: 0
PAINLEVEL_OUTOF10: 0

## 2020-05-07 NOTE — CONSULTS
Thank you to requesting provider:  TREVOR Morales  , for asking us to see Matilde Parham  Reason for consultation:  Acute Kidney Injury  Chief Complaint:  Lethargy/weakness    History of Presenting Illness      81 y/o with history of CKD and dementia. 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 He is not able to provide much history but endorses that he could be volume depleted. He has a guillory and he is on IV fluids. He was at baseline kidney function 2 months ago. Now Scr up > 7. He is on room air. Seems to have decline cognitively since last fall.        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    Fatigue 11/02/2012    Contact dermatitis 11/09/2012    Xeroderma 03/19/2013    Murmur, cardiac 10/29/2013    Tobacco abuse 10/29/2013    Bilateral low back pain with right-sided sciatica 10/04/2015    NSTEMI (non-ST elevated myocardial infarction) ([address] 12/25/2016    Ischemic cardiomyopathy 12/01/2019    Left bundle branch block 12/01/2019    Nonrheumatic mitral valve regurgitation 12/01/2019    Bradycardia 03/26/2020    STEPHANIE (acute kidney injury) ([facility] [address] 05/06/2020     Resolved Ambulatory Problems     Diagnosis Date Noted    Nodular prostate without urinary obstruction 08/04/2011    Need for influenza vaccination 10/25/2011    Prostate cancer screening 03/15/2012    Cough 10/29/2013    Elevated troponin for asking us to participate in the management of your patient, please do not hesitate to contact me for any concerns regarding my recommendations as outlined above.    -----------------------------  Reena Rothman M.D.   [facility]

## 2020-05-07 NOTE — PROGRESS NOTES
4 Eyes Skin Assessment     The patient is being assess for   admission    I agree that 2 RN's have performed a thorough Head to Toe Skin Assessment on the patient. ALL assessment sites listed below have been assessed.       Areas assessed by both nurses:   [x]   Head, Face, and Ears   [x]   Shoulders, Back, and Chest, Abdomen  [x]   Arms, Elbows, and Hands   [x]   Coccyx, Sacrum, and Ischium  [x]   Legs, Feet, and Heels  bruise on bottom of right foot and bruise on left foot callloused dry heels red buttocks and between buttocks red sl blanches dried stool on pt bruises on bilat elbows , hands and arms bruises on left hip, left knee and R buttocks scattered        SHARE this note so that the co-signing nurse is able to place an eSignature**    Co-signer eSignature: Electronically signed by Nate Peter RN on 5/7/20 at 2:00 AM EDT    Does the Patient have Skin Breakdown?  no          Frankie Prevention initiated:  {YES/   Wound Care Orders initiated:  {no      [facility] nurse consulted for Pressure Injury (Stage 3,4, Unstageable, DTI, NWPT, Complex wounds)and New or Established Ostomies:  {no    Primary Nurse eSignature: Electronically signed by Flaquita Hayward RN on 5/6/20 at 11:12 PM EDT       e

## 2020-05-07 NOTE — ED PROVIDER NOTES
I personally interviewed and examined this patient, discussed the findings, diagnostic studies, interventions and treatment plan with ZAHRAA. . I reviewed the clinical notes and test results. I personally supervised all pertinent procedures performed on the patient by the ZAHRAA throughout the course and was available to manage complications as they arose, if applicable. I agree with the assessment, management and disposition as presented by the ZAHRAA with exceptions/corrections as documented. Pt presents w/ report of apparent self care deficit, found by staff in his independent living unit covered in stool and fatigued but not confused, and EMS found pt to be bradycardic in the 40s. Here labs show STEPHANIE, global 3/5 strength on exam UE and LE     Willl admit for further eval of STEPHANIE and case mgmt    For further details of this emergency department encounter, please see the ZAHRAA's documentation.       ED Triage Vitals [05/06/20 1849]   BP Temp Temp Source Pulse Resp SpO2 Height Weight   (!) 116/46 97.2 °F (36.2 °C) Oral (!) 48 16 99 % 5' 9\" (1.753 m) 133 lb (60.3 kg)        Results for orders placed or performed during the hospital encounter of 05/06/20   CBC auto differential   Result Value Ref Range    WBC 6.7 4.0 - 11.0 K/uL    RBC 3.39 (L) 4.20 - 5.90 M/uL    Hemoglobin 10.4 (L) 13.5 - 17.5 g/dL    Hematocrit 31.0 (L) 40.5 - 52.5 %    MCV 91.5 80.0 - 100.0 fL    MCH 30.8 26.0 - 34.0 pg    MCHC 33.6 31.0 - 36.0 g/dL    RDW 17.4 (H) 12.4 - 15.4 %    Platelets 84 (L) 659 - 450 K/uL    MPV 9.0 5.0 - 10.5 fL    PLATELET SLIDE REVIEW Decreased     SLIDE REVIEW see below     Neutrophils % 72.9 %    Lymphocytes % 16.2 %    Monocytes % 5.7 %    Eosinophils % 4.2 %    Basophils % 1.0 %    Neutrophils Absolute 4.9 1.7 - 7.7 K/uL    Lymphocytes Absolute 1.1 1.0 - 5.1 K/uL    Monocytes Absolute 0.4 0.0 - 1.3 K/uL    Eosinophils Absolute 0.3 0.0 - 0.6 K/uL    Basophils Absolute 0.1 0.0 - 0.2 K/uL   Comprehensive metabolic panel
48 50 58   Resp: 16  16 20   Temp:    96.8 °F (36 °C)   TempSrc:    Oral   SpO2: 100%  100% 99%   Weight:    120 lb 9.5 oz (54.7 kg)   Height:    5' 9\" (1.753 m)     HENT: Normocephalic. Atraumatic. External ears normal, without discharge. TMs clear bilaterally. No nasal discharge. Oropharynx clear, no erythema. Mucous membranes moist.  EYES: Conjunctiva non-injected, no lid abnormalities noted. No scleral icterus. PERRL. EOM's grossly intact. Anterior chambers clear. NECK: Supple. Normal ROM. No meningismus. No thyroid tenderness or swelling noted. CARDIOVASCULAR: RRR. No Murmer. PULMONARY/CHEST WALL: Effort normal. No tachypnea. Lungs clear to ausculation. ABDOMEN: Normal BS. Soft. Nondistended. No tenderness to palpate. No guarding. No hernias noted. No splenomegaly. Back: Spine is midline. No ecchymosis. No crepitus on palpation. No obvious subluxation of vertebral column. No saddle anesthesia or evidence of cauda equina. /ANORECTAL: Not assessed  MUSKULOSKELETAL: Normal ROM. No acute deformities. No edema. No tenderness to palpate. SKIN: Warm and dry. NEUROLOGICAL:  GCS 15. CN II-XII grossly intact. Strength is 5/5 in allextremities and sensation is intact. PSYCHIATRIC: pleasantly confused.         DIAGNOSTIC RESULTS:     LABS:    Results for orders placed or performed during the hospital encounter of 05/06/20   CBC auto differential   Result Value Ref Range    WBC 6.7 4.0 - 11.0 K/uL    RBC 3.39 (L) 4.20 - 5.90 M/uL    Hemoglobin 10.4 (L) 13.5 - 17.5 g/dL    Hematocrit 31.0 (L) 40.5 - 52.5 %    MCV 91.5 80.0 - 100.0 fL    MCH 30.8 26.0 - 34.0 pg    MCHC 33.6 31.0 - 36.0 g/dL    RDW 17.4 (H) 12.4 - 15.4 %    Platelets 84 (L) 584 - 450 K/uL    MPV 9.0 5.0 - 10.5 fL    PLATELET SLIDE REVIEW Decreased     SLIDE REVIEW see below     Neutrophils % 72.9 %    Lymphocytes % 16.2 %    Monocytes % 5.7 %    Eosinophils % 4.2 %    Basophils % 1.0 %    Neutrophils Absolute 4.9 1.7 - 7.7 K/uL    Lymphocytes

## 2020-05-07 NOTE — PROGRESS NOTES
Hospitalist Progress Note      PCP: Brad Newton MD    Date of Admission: 5/6/2020    Chief Complaint: Weakness    Subjective: no new c/o. Medications:  Reviewed    Infusion Medications    sodium chloride Stopped (05/06/20 2128)    sodium chloride 100 mL/hr at 05/07/20 0615     Scheduled Medications    donepezil  10 mg Oral Nightly    FLUoxetine  20 mg Oral Daily    memantine  5 mg Oral BID    metoprolol succinate  12.5 mg Oral Nightly    atorvastatin  20 mg Oral Daily    sodium chloride flush  10 mL Intravenous 2 times per day    levothyroxine  175 mcg Oral Daily     PRN Meds: sodium chloride flush, acetaminophen **OR** acetaminophen, promethazine **OR** [DISCONTINUED] ondansetron      Intake/Output Summary (Last 24 hours) at 5/7/2020 1250  Last data filed at 5/7/2020 0848  Gross per 24 hour   Intake 1531.66 ml   Output 600 ml   Net 931.66 ml       Physical Exam Performed:    BP (!) 123/57   Pulse 61   Temp 97.4 °F (36.3 °C) (Oral)   Resp 16   Ht 5' 9\" (1.753 m)   Wt 120 lb 9.5 oz (54.7 kg)   SpO2 99%   BMI 17.81 kg/m²     General appearance: No apparent distress, appears stated age and cooperative. HEENT: Pupils equal, round, and reactive to light. Conjunctivae/corneas clear. Neck: Supple, with full range of motion. No jugular venous distention. Trachea midline. Respiratory:  Normal respiratory effort. Clear to auscultation, bilaterally without Rales/Wheezes/Rhonchi. Cardiovascular: Regular rate and rhythm with normal S1/S2 without murmurs, rubs or gallops. Abdomen: Soft, non-tender, non-distended with normal bowel sounds. Musculoskeletal: No clubbing, cyanosis or edema bilaterally. Full range of motion without deformity. Skin: Skin color, texture, turgor normal.  No rashes or lesions. Neurologic:  Neurovascularly intact without any focal sensory/motor deficits.  Cranial nerves: II-XII intact, grossly non-focal.  Psychiatric: Alert and oriented, thought content appropriate, normal insight  Capillary Refill: Brisk,< 3 seconds   Peripheral Pulses: +2 palpable, equal bilaterally       Labs:   Recent Labs     05/06/20 1854   WBC 6.7   HGB 10.4*   HCT 31.0*   PLT 84*     Recent Labs     05/06/20  1854 05/07/20  0554    148*   K 5.3* 4.9    108   CO2 21 22   * 114*   CREATININE 7.8* 7.4*   CALCIUM 8.2* 8.1*   PHOS  --  6.2*     Recent Labs     05/06/20  1854   AST 11*   ALT <5*   BILITOT 0.5   ALKPHOS 60     No results for input(s): INR in the last 72 hours. Recent Labs     05/06/20 1854   TROPONINI 0.09*       Urinalysis:      Lab Results   Component Value Date    NITRU Negative 05/06/2020    WBCUA 0-2 01/29/2020    BACTERIA 1+ 01/29/2020    RBCUA 3-5 01/29/2020    BLOODU Negative 05/06/2020    SPECGRAV 1.020 05/06/2020    GLUCOSEU Negative 05/06/2020    GLUCOSEU Negative 12/19/2011       Consults:    IP CONSULT TO NEPHROLOGY  IP CONSULT TO HOSPITALIST  IP CONSULT TO DIETITIAN      Assessment/Plan:    Active Hospital Problems    Diagnosis    ARF (acute renal failure) (HCC) [N17.9]    HTN (hypertension) [I10]    Hyperlipidemia [E78.5]    Dementia ([facility] [address] [F03.90]    Hypothyroid [E03.9]       ARF - w/ elevated BUN/Cr ratio c/w pre-renal azotemia. Given IVF hydration and follow serial labs. Reviewed and documented as above. Nephrology consulted and appreciated. HyperKalemia - likely 2nd to above - given Kayexalate. Follow serial labs. Reviewed and documented as above. HTN - w/out known CAD and no evidence of active signs/sxs of ischemia/failure. Currently controlled on home meds w/ vitals reviewed and documented as above. HyperLipidemia - controlled on home Statin. Continue, w/ f/u and med adjustment w/ PCP    HypoThyroid - clinically euthyroid on oral replacement therapy. Continue, w/ outpt monitoring as previously arranged. Anemia - etiology clinically unable to determine, w/out evidence of active bleeding/hemolysis.  Asymptomatic w/out indication for transfusion. Follow serial labs. Reviewed and documented as above. Troponin elevation - of unclear clinical significance w/ etiology clinically unable to determine but likely 2nd to ARF, w/out signs/sxs of active ischemia. Dementia - w/out behavioral disturbance. Controlled on home medication regimen - continued. Continue supportive care and redirection as needed.        DVT Prophylaxis: IPC  Diet: DIET RENAL;  Code Status: Full Code      PT/OT Eval Status: no new c/o. Dispo - quite possibly here over the weekend, pending clinical improvement and subspecialty recs.      Wil Rodrigues MD

## 2020-05-07 NOTE — H&P
non-tender or non-distended without rigidity or guarding and positive bowel sounds all four quadrants. Extremities: No clubbing, cyanosis, or edema bilaterally. Skin: Skin color, texture, turgor normal.  No rashes or lesions. Neurologic: Alert and oriented  grossly non-focal.  Capillary Refill: Acceptable  < 3 seconds  Peripheral Pulses: +3 Easily felt, not easily obliterated with pressure      CXR:  I have reviewed the CXR   EKG:  I have reviewed the EKG     CBC   Recent Labs     05/06/20 1854   WBC 6.7   HGB 10.4*   HCT 31.0*   PLT 84*      RENAL  Recent Labs     05/06/20 1854      K 5.3*      CO2 21   *   CREATININE 7.8*     LFT'S  Recent Labs     05/06/20 1854   AST 11*   ALT <5*   BILITOT 0.5   ALKPHOS 60     COAG  No results for input(s): INR in the last 72 hours. CARDIAC ENZYMES  Recent Labs     05/06/20 1854   TROPONINI 0.09*       U/A:    Lab Results   Component Value Date    COLORU Yellow 05/06/2020    WBCUA 0-2 01/29/2020    RBCUA 3-5 01/29/2020    MUCUS Rare 12/23/2019    BACTERIA 1+ 01/29/2020    CLARITYU Clear 05/06/2020    SPECGRAV 1.020 05/06/2020    LEUKOCYTESUR Negative 05/06/2020    BLOODU Negative 05/06/2020    GLUCOSEU Negative 05/06/2020    GLUCOSEU Negative 12/19/2011    AMORPHOUS Rare 12/23/2019       ABG  No results found for: BYR0DEN, BEART, J3FLFLVM, PHART, THGBART, UDE8NGO, PO2ART, TLS0DQJ        Active Hospital Problems    Diagnosis Date Noted    STEPHANIE (acute kidney injury) ([facility] [address] [N17.9] 05/06/2020         PHYSICIANS CERTIFICATION:    I certify that Barrington Bobo is expected to be hospitalized for more than 2 midnights based on the following assessment and plan:      ASSESSMENT/PLAN:    Hyperkalemia  Anemia  Thrombocytopenia  htn  hld    Tele  ivf  Monitor and correct electrolytes  Renal consult  Resume home meds  Avoid nephrotoxins      DVT Prophylaxis: n/a  Diet: DIET RENAL;  Code Status: Full Code      Dispo - inpt.        Kirby Crisostomo MD    Thank you Matti Michaud Bebo Sarah MD for the opportunity to be involved in this patient's care. If you have any questions or concerns please feel free to contact me at [phone].

## 2020-05-07 NOTE — CARE COORDINATION
CASE MANAGEMENT INITIAL ASSESSMENT      Reviewed chart and completed assessment via telephone with: daughter  Explained Case Management role/services. Yes    Primary contact information: Cody Jason [phone]    Admit date/status: 5/6/20 IP  Diagnosis: STEPHANIE    Insurance: BCBS Medicare  Precert required for SNF - Y        3 night stay required - N    Living arrangements, Adls, care needs, prior to admission:  Lives at the [facility] and uses a walker and uses a power chair. Transportation: TBD    Durable Medical Equipment at home: Walker_X_Cane__RTS__ BSC__Shower Chair__  02__ HHN__ CPAP__  BiPap__  Hospital Bed__ W/C___ Other__________    Services in the home and/or outpatient, prior to admission: [facility] [employer], SN med passes daily    PT/OT recs: None seen at this time. Hospital Exemption Notification (HEN): Needed for SNF, not initiated. Barriers to discharge: None    Plan/comments: CM notes that pr is confused. Spoke to daughter Florentino Jason for initial assessment. Florentino Jason states that her dad cannot return back to [facility], he cannot take care of himself right now. Daughter would like referrals to [facility]. [address] [place] [address] CC. Referrals faxed and call placed to confirm receipt.  ION following-Paula Astorga RN      ECOC on chart for MD signature

## 2020-05-08 LAB
ALBUMIN SERPL-MCNC: 3.3 G/DL (ref 3.4–5)
ANION GAP SERPL CALCULATED.3IONS-SCNC: 14 MMOL/L (ref 3–16)
BUN BLDV-MCNC: 88 MG/DL (ref 7–20)
CALCIUM SERPL-MCNC: 7.8 MG/DL (ref 8.3–10.6)
CHLORIDE BLD-SCNC: 107 MMOL/L (ref 99–110)
CO2: 22 MMOL/L (ref 21–32)
CREAT SERPL-MCNC: 5.3 MG/DL (ref 0.8–1.3)
GFR AFRICAN AMERICAN: 13
GFR NON-AFRICAN AMERICAN: 10
GLUCOSE BLD-MCNC: 82 MG/DL (ref 70–99)
HCT VFR BLD CALC: 28.5 % (ref 40.5–52.5)
HEMOGLOBIN: 9.6 G/DL (ref 13.5–17.5)
MAGNESIUM: 2.4 MG/DL (ref 1.8–2.4)
MCH RBC QN AUTO: 30.8 PG (ref 26–34)
MCHC RBC AUTO-ENTMCNC: 33.6 G/DL (ref 31–36)
MCV RBC AUTO: 91.7 FL (ref 80–100)
PDW BLD-RTO: 17.6 % (ref 12.4–15.4)
PHOSPHORUS: 3.9 MG/DL (ref 2.5–4.9)
PLATELET # BLD: 70 K/UL (ref 135–450)
PMV BLD AUTO: 8.5 FL (ref 5–10.5)
POTASSIUM SERPL-SCNC: 3.7 MMOL/L (ref 3.5–5.1)
RBC # BLD: 3.11 M/UL (ref 4.2–5.9)
SODIUM BLD-SCNC: 143 MMOL/L (ref 136–145)
TROPONIN: 0.08 NG/ML
WBC # BLD: 6 K/UL (ref 4–11)

## 2020-05-08 PROCEDURE — 97162 PT EVAL MOD COMPLEX 30 MIN: CPT

## 2020-05-08 PROCEDURE — 84484 ASSAY OF TROPONIN QUANT: CPT

## 2020-05-08 PROCEDURE — 6370000000 HC RX 637 (ALT 250 FOR IP): Performed by: INTERNAL MEDICINE

## 2020-05-08 PROCEDURE — 36415 COLL VENOUS BLD VENIPUNCTURE: CPT

## 2020-05-08 PROCEDURE — 97116 GAIT TRAINING THERAPY: CPT

## 2020-05-08 PROCEDURE — 97166 OT EVAL MOD COMPLEX 45 MIN: CPT

## 2020-05-08 PROCEDURE — 1200000000 HC SEMI PRIVATE

## 2020-05-08 PROCEDURE — 2580000003 HC RX 258: Performed by: INTERNAL MEDICINE

## 2020-05-08 PROCEDURE — U0003 INFECTIOUS AGENT DETECTION BY NUCLEIC ACID (DNA OR RNA); SEVERE ACUTE RESPIRATORY SYNDROME CORONAVIRUS 2 (SARS-COV-2) (CORONAVIRUS DISEASE [COVID-19]), AMPLIFIED PROBE TECHNIQUE, MAKING USE OF HIGH THROUGHPUT TECHNOLOGIES AS DESCRIBED BY CMS-2020-01-R: HCPCS

## 2020-05-08 PROCEDURE — 97530 THERAPEUTIC ACTIVITIES: CPT

## 2020-05-08 PROCEDURE — 83735 ASSAY OF MAGNESIUM: CPT

## 2020-05-08 PROCEDURE — 80069 RENAL FUNCTION PANEL: CPT

## 2020-05-08 PROCEDURE — 85027 COMPLETE CBC AUTOMATED: CPT

## 2020-05-08 RX ADMIN — Medication 10 ML: at 09:31

## 2020-05-08 RX ADMIN — SODIUM CHLORIDE, POTASSIUM CHLORIDE, SODIUM LACTATE AND CALCIUM CHLORIDE: 600; 310; 30; 20 INJECTION, SOLUTION INTRAVENOUS at 20:19

## 2020-05-08 RX ADMIN — FLUOXETINE 20 MG: 20 CAPSULE ORAL at 09:31

## 2020-05-08 RX ADMIN — DONEPEZIL HYDROCHLORIDE 10 MG: 5 TABLET, FILM COATED ORAL at 20:16

## 2020-05-08 RX ADMIN — ATORVASTATIN CALCIUM 20 MG: 10 TABLET, FILM COATED ORAL at 09:31

## 2020-05-08 RX ADMIN — MEMANTINE 5 MG: 5 TABLET ORAL at 20:16

## 2020-05-08 RX ADMIN — METOPROLOL SUCCINATE 12.5 MG: 25 TABLET, EXTENDED RELEASE ORAL at 20:16

## 2020-05-08 RX ADMIN — MEMANTINE 5 MG: 5 TABLET ORAL at 09:31

## 2020-05-08 NOTE — PROGRESS NOTES
Progress Note    HISTORY     CC:  Lethargy            We are following for acute kidney injury      Subjective/   HPI:  Good urine output. Labs are improving. BP stable. Remains on room air.       ROS:  Constitutional:  No fevers, No Chills, + weakness  Cardiovascular:  No palpations, no edema  Respiratory:  No wheezing, no cough  Skin:  No rash, no itching  :  No hematuria, no dysuria     Social Hx:  No family at bedside     Past Medical and Surgical History:  - Reviewed, no changes     EXAM       Objective/     Vitals:    05/07/20 1956 05/07/20 2111 05/08/20 0405 05/08/20 0933   BP: (!) 146/60 (!) 107/50 125/62 (!) 126/52   Pulse: 59 50 53 (!) 49   Resp: 16 16 16 16   Temp: 97.6 °F (36.4 °C) 98 °F (36.7 °C) 98.1 °F (36.7 °C) 98 °F (36.7 °C)   TempSrc: Oral Oral Oral Oral   SpO2: 98% 99% 99% 99%   Weight:       Height:         24HR INTAKE/OUTPUT:      Intake/Output Summary (Last 24 hours) at 5/8/2020 1123  Last data filed at 5/8/2020 0405  Gross per 24 hour   Intake 240 ml   Output 1400 ml   Net -1160 ml     Constitutional:  Alert, awake, no apparent distress  Eyes:  Pupils reactive, sclera clear   Neck:  Normal thyroid, no masses   Cardiovascular:  Regular, no rub  Respiratory:  No distress, no wheezing  Psychiatry:  Appropriate mood/affect, alert  Abdomen: +bs, soft, nt, no masses   Musculoskeletal: No LE edema, no clubbing   Lymphatics:  No LAD in neck, no supraclavicular nodes       MEDICAL DECISION MAKING       Data/  Recent Labs     05/06/20 1854 05/08/20  0505   WBC 6.7 6.0   HGB 10.4* 9.6*   HCT 31.0* 28.5*   MCV 91.5 91.7   PLT 84* 70*     Recent Labs     05/06/20 1854 05/07/20  0554 05/08/20  0505    148* 143   K 5.3* 4.9 3.7    108 107   CO2 21 22 22   GLUCOSE 87 85 82   PHOS  --  6.2* 3.9   MG  --   --  2.40   * 114* 88*   CREATININE 7.8* 7.4* 5.3*   LABGLOM 7* 7* 10*   GFRAA 8* 9* 13*       Assessment/     Acute Kidney Injury:  KDIGO stage 3  - Etiology:  Hopefully

## 2020-05-08 NOTE — PROGRESS NOTES
Patient is alert and oriented to person and place, only;  VSS;  Shift assessment completed; Bed locked and in lowest position. Bedside table and call light within reach;  AVASYS in room; Pt denies further needs. Will continue to monitor.

## 2020-05-08 NOTE — PROGRESS NOTES
Peripheral Pulses: +2 palpable, equal bilaterally       Labs:   Recent Labs     05/06/20 1854 05/08/20  0505   WBC 6.7 6.0   HGB 10.4* 9.6*   HCT 31.0* 28.5*   PLT 84* 70*     Recent Labs     05/06/20 1854 05/07/20  0554 05/08/20  0505    148* 143   K 5.3* 4.9 3.7    108 107   CO2 21 22 22   * 114* 88*   CREATININE 7.8* 7.4* 5.3*   CALCIUM 8.2* 8.1* 7.8*   PHOS  --  6.2* 3.9     Recent Labs     05/06/20  1854   AST 11*   ALT <5*   BILITOT 0.5   ALKPHOS 60     No results for input(s): INR in the last 72 hours. Recent Labs     05/06/20 1854 05/08/20  0505   TROPONINI 0.09* 0.08*       Urinalysis:      Lab Results   Component Value Date    NITRU Negative 05/06/2020    WBCUA 0-2 01/29/2020    BACTERIA 1+ 01/29/2020    RBCUA 3-5 01/29/2020    BLOODU Negative 05/06/2020    SPECGRAV 1.020 05/06/2020    GLUCOSEU Negative 05/06/2020    GLUCOSEU Negative 12/19/2011       Consults:    IP CONSULT TO NEPHROLOGY  IP CONSULT TO HOSPITALIST  IP CONSULT TO DIETITIAN      Assessment/Plan:    Active Hospital Problems    Diagnosis    ARF (acute renal failure) (HCC) [N17.9]    HTN (hypertension) [I10]    Hyperlipidemia [E78.5]    Dementia ([facility] [address] [F03.90]    Mild malnutrition ([facility] [address] [E44.1]    Hypothyroid [E03.9]       ARF - w/ elevated BUN/Cr ratio c/w pre-renal azotemia. Given IVF hydration and follow serial labs - improving. Reviewed and documented as above. Nephrology consulted and appreciated. HyperKalemia - likely 2nd to above - given Kayexalate. Follow serial labs. Reviewed and documented as above. HTN - w/out known CAD and no evidence of active signs/sxs of ischemia/failure. Currently controlled on home meds w/ vitals reviewed and documented as above. HyperLipidemia - controlled on home Statin. Continue, w/ f/u and med adjustment w/ PCP    HypoThyroid - clinically euthyroid on oral replacement therapy. Continue, w/ outpt monitoring as previously arranged.      Anemia - etiology clinically

## 2020-05-08 NOTE — PROGRESS NOTES
cleared pt for OT eval; pt resting in bed, agreeable to get up with therapy  Patient Currently in Pain: Denies  Vital Signs  Patient Currently in Pain: Denies  Social/Functional History  Social/Functional History  Lives With: Alone  Type of Home: Facility([employer])  Home Layout: One level  Home Access: Elevator, Level entry  Bathroom Shower/Tub: Walk-in shower  Bathroom Equipment: Shower chair, Grab bars in [address]: Wheelchair-electric  ADL Assistance: Needs assistance  Bath: Maximum assistance  Dressing: Maximum assistance  Toileting: Needs assistance  Ambulation Assistance: Needs assistance  Transfer Assistance: Needs assistance  Additional Comments: info obtained from prior admission 3-28-20       Objective   Vision: Within Functional Limits  Hearing: Within functional limits    Orientation  Overall Orientation Status: Impaired  Orientation Level: Oriented to person;Disoriented to situation;Disoriented to time;Disoriented to place  Observation/Palpation  Posture: Fair(forward lean)  Balance  Sitting Balance: Supervision  Standing Balance:  Moderate assistance(to of 1 to manage RW)  Standing Balance  Activity: walking in room  ADL  Feeding: Setup(to open packages seated in chair)  Grooming: Setup(seated to wash hands & face)  Toileting: Dependent/Total(guillory catheter)    RUE Tone: Normotonic  LUE Tone: Normotonic     Bed mobility  Supine to Sit: Moderate assistance  Sit to Supine: Unable to assess(Left up in chair for lunch, pt agreeable)  Transfers  Sit to stand: Minimal assistance  Stand to sit: Minimal assistance  Transfer Comments: cues for safe hand placement     Cognition  Overall Cognitive Status: Exceptions  Arousal/Alertness: Delayed responses to stimuli  Following Commands: Inconsistently follows commands  Attention Span: Difficulty attending to directions  Memory: Decreased short term memory;Decreased recall of recent events;Decreased recall of precautions  Safety Judgement: Decreased awareness of need for assistance;Decreased awareness of need for safety  Insights: Decreased awareness of deficits  Initiation: Requires cues for all  Sequencing: Requires cues for some           Plan   Plan  Times per week: 3-5x/ week   Current Treatment Recommendations: ROM, Balance Training, Self-Care / ADL, Cognitive Reorientation, Functional Mobility Training, Endurance Training    AM-PAC Score        AM-PAC Inpatient Daily Activity Raw Score: 11 (05/08/20 1159)  AM-PAC Inpatient ADL T-Scale Score : 29.04 (05/08/20 1159)  ADL Inpatient CMS 0-100% Score: 70.42 (05/08/20 1159)  ADL Inpatient CMS G-Code Modifier : CL (05/08/20 1159)    Goals  Short term goals  Time Frame for Short term goals: 1 week (5-15-20)  Short term goal 1: min assist with bed mobility by 5-12-20  Short term goal 2: min assist with bathroom mobility by 5-15-20  Short term goal 3: set up only for UE ADL's  Short term goal 4: tolerate 15 reps BUE AROM exercises to increase strength for transfers   Patient Goals   Patient goals : unable to verbalize/identify at this time       Therapy Time   Individual Concurrent Group Co-treatment   Time In [address]         Time Out 1153         Minutes [address], [place]

## 2020-05-09 LAB
ALBUMIN SERPL-MCNC: 3.2 G/DL (ref 3.4–5)
ANION GAP SERPL CALCULATED.3IONS-SCNC: 10 MMOL/L (ref 3–16)
BUN BLDV-MCNC: 71 MG/DL (ref 7–20)
CALCIUM SERPL-MCNC: 8.1 MG/DL (ref 8.3–10.6)
CHLORIDE BLD-SCNC: 111 MMOL/L (ref 99–110)
CO2: 25 MMOL/L (ref 21–32)
CREAT SERPL-MCNC: 3.6 MG/DL (ref 0.8–1.3)
GFR AFRICAN AMERICAN: 20
GFR NON-AFRICAN AMERICAN: 16
GLUCOSE BLD-MCNC: 85 MG/DL (ref 70–99)
HCT VFR BLD CALC: 29.1 % (ref 40.5–52.5)
HEMOGLOBIN: 10 G/DL (ref 13.5–17.5)
MCH RBC QN AUTO: 30.8 PG (ref 26–34)
MCHC RBC AUTO-ENTMCNC: 34.2 G/DL (ref 31–36)
MCV RBC AUTO: 90.2 FL (ref 80–100)
PDW BLD-RTO: 17.5 % (ref 12.4–15.4)
PHOSPHORUS: 3.1 MG/DL (ref 2.5–4.9)
PLATELET # BLD: 70 K/UL (ref 135–450)
PMV BLD AUTO: 8.9 FL (ref 5–10.5)
POTASSIUM SERPL-SCNC: 4.2 MMOL/L (ref 3.5–5.1)
RBC # BLD: 3.23 M/UL (ref 4.2–5.9)
SODIUM BLD-SCNC: 146 MMOL/L (ref 136–145)
WBC # BLD: 6.2 K/UL (ref 4–11)

## 2020-05-09 PROCEDURE — 1200000000 HC SEMI PRIVATE

## 2020-05-09 PROCEDURE — 6370000000 HC RX 637 (ALT 250 FOR IP): Performed by: INTERNAL MEDICINE

## 2020-05-09 PROCEDURE — 36415 COLL VENOUS BLD VENIPUNCTURE: CPT

## 2020-05-09 PROCEDURE — 80069 RENAL FUNCTION PANEL: CPT

## 2020-05-09 PROCEDURE — 2580000003 HC RX 258: Performed by: INTERNAL MEDICINE

## 2020-05-09 PROCEDURE — 85027 COMPLETE CBC AUTOMATED: CPT

## 2020-05-09 RX ADMIN — FLUOXETINE 20 MG: 20 CAPSULE ORAL at 09:40

## 2020-05-09 RX ADMIN — DONEPEZIL HYDROCHLORIDE 10 MG: 5 TABLET, FILM COATED ORAL at 20:02

## 2020-05-09 RX ADMIN — ATORVASTATIN CALCIUM 20 MG: 10 TABLET, FILM COATED ORAL at 09:40

## 2020-05-09 RX ADMIN — METOPROLOL SUCCINATE 12.5 MG: 25 TABLET, EXTENDED RELEASE ORAL at 20:02

## 2020-05-09 RX ADMIN — MEMANTINE 5 MG: 5 TABLET ORAL at 09:40

## 2020-05-09 RX ADMIN — SODIUM CHLORIDE, POTASSIUM CHLORIDE, SODIUM LACTATE AND CALCIUM CHLORIDE: 600; 310; 30; 20 INJECTION, SOLUTION INTRAVENOUS at 20:02

## 2020-05-09 RX ADMIN — MEMANTINE 5 MG: 5 TABLET ORAL at 20:02

## 2020-05-09 RX ADMIN — SODIUM CHLORIDE, POTASSIUM CHLORIDE, SODIUM LACTATE AND CALCIUM CHLORIDE: 600; 310; 30; 20 INJECTION, SOLUTION INTRAVENOUS at 09:40

## 2020-05-09 ASSESSMENT — PAIN SCALES - GENERAL: PAINLEVEL_OUTOF10: 0

## 2020-05-09 NOTE — PLAN OF CARE
Pt resting in bed quietly. Bed in lowest position, wheels locked, side rails up X2, non skid socks on. Posey bed check alarm engaged. Yakif[employer] video monitor remains engaged. Pt instructed not to get out of bed on own, to use call light for staff assistance when ambulating or other needs. Pt verbalizes understanding. Call light within reach. Will continue to monitor.      Problem: Falls - Risk of:  Goal: Absence of physical injury  Description: Absence of physical injury  Outcome: Ongoing     Problem: Falls - Risk of:  Goal: Will remain free from falls  Description: Will remain free from falls  Outcome: Ongoing

## 2020-05-09 NOTE — PROGRESS NOTES
Hospitalist Progress Note      PCP: Sj Thakur MD    Date of Admission: 5/6/2020    Chief Complaint: Weakness    Subjective: no new c/o. Medications:  Reviewed    Infusion Medications    lactated ringers 100 mL/hr at 05/08/20 2019     Scheduled Medications    donepezil  10 mg Oral Nightly    FLUoxetine  20 mg Oral Daily    memantine  5 mg Oral BID    metoprolol succinate  12.5 mg Oral Nightly    atorvastatin  20 mg Oral Daily    sodium chloride flush  10 mL Intravenous 2 times per day    levothyroxine  175 mcg Oral Daily     PRN Meds: sodium chloride flush, acetaminophen **OR** acetaminophen, promethazine **OR** [DISCONTINUED] ondansetron      Intake/Output Summary (Last 24 hours) at 5/9/2020 0807  Last data filed at 5/9/2020 0240  Gross per 24 hour   Intake 40 ml   Output 1700 ml   Net -1660 ml       Physical Exam Performed:    BP (!) 107/56   Pulse 53   Temp 98.3 °F (36.8 °C) (Oral)   Resp 16   Ht 5' 9\" (1.753 m)   Wt 129 lb 13.6 oz (58.9 kg)   SpO2 99%   BMI 19.18 kg/m²     General appearance: No apparent distress, appears stated age and cooperative. HEENT: Pupils equal, round, and reactive to light. Conjunctivae/corneas clear. Neck: Supple, with full range of motion. No jugular venous distention. Trachea midline. Respiratory:  Normal respiratory effort. Clear to auscultation, bilaterally without Rales/Wheezes/Rhonchi. Cardiovascular: Regular rate and rhythm with normal S1/S2 without murmurs, rubs or gallops. Abdomen: Soft, non-tender, non-distended with normal bowel sounds. Musculoskeletal: No clubbing, cyanosis or edema bilaterally. Full range of motion without deformity. Skin: Skin color, texture, turgor normal.  No rashes or lesions. Neurologic:  Neurovascularly intact without any focal sensory/motor deficits.  Cranial nerves: II-XII intact, grossly non-focal.  Psychiatric: Alert and oriented, thought content appropriate, normal insight  Capillary Refill: Brisk,< 3 seconds   Peripheral Pulses: +2 palpable, equal bilaterally       Labs:   Recent Labs     05/06/20  1854 05/08/20  0505 05/09/20  0554   WBC 6.7 6.0 6.2   HGB 10.4* 9.6* 10.0*   HCT 31.0* 28.5* 29.1*   PLT 84* 70* 70*     Recent Labs     05/07/20  0554 05/08/20  0505 05/09/20  0554   * 143 146*   K 4.9 3.7 4.2    107 111*   CO2 22 22 25   * 88* 71*   CREATININE 7.4* 5.3* 3.6*   CALCIUM 8.1* 7.8* 8.1*   PHOS 6.2* 3.9 3.1     Recent Labs     05/06/20  1854   AST 11*   ALT <5*   BILITOT 0.5   ALKPHOS 60     No results for input(s): INR in the last 72 hours. Recent Labs     05/06/20 1854 05/08/20  0505   TROPONINI 0.09* 0.08*       Urinalysis:      Lab Results   Component Value Date    NITRU Negative 05/06/2020    WBCUA 0-2 01/29/2020    BACTERIA 1+ 01/29/2020    RBCUA 3-5 01/29/2020    BLOODU Negative 05/06/2020    SPECGRAV 1.020 05/06/2020    GLUCOSEU Negative 05/06/2020    GLUCOSEU Negative 12/19/2011       Consults:    IP CONSULT TO NEPHROLOGY  IP CONSULT TO HOSPITALIST  IP CONSULT TO DIETITIAN      Assessment/Plan:    Active Hospital Problems    Diagnosis    ARF (acute renal failure) (HCC) [N17.9]    HTN (hypertension) [I10]    Hyperlipidemia [E78.5]    Dementia ([facility] [address] [F03.90]    Mild malnutrition ([facility] [address] [E44.1]    Hypothyroid [E03.9]       ARF - w/ elevated BUN/Cr ratio c/w pre-renal azotemia. Given IVF hydration and follow serial labs - continues to improve. Reviewed and documented as above. Nephrology consulted and appreciated - will likely avoid HD. HyperKalemia - likely 2nd to above - given Kayexalate. Follow serial labs. Reviewed and documented as above. HTN - w/out known CAD and no evidence of active signs/sxs of ischemia/failure. Currently controlled on home meds w/ vitals reviewed and documented as above. HyperLipidemia - controlled on home Statin. Continue, w/ f/u and med adjustment w/ PCP    HypoThyroid - clinically euthyroid on oral replacement therapy.  Continue,

## 2020-05-09 NOTE — PROGRESS NOTES
Injury:  KDIGO stage 3  - Etiology:  Hopefully just volume depletion, he is making urine with volume and has a guillory  - Clinical:  Scr 7.4 -> 3.6, very good response to fluids. Seems he will trend to prior baseline and avoid dialysis      Chronic Kidney Disease:  Stage 4  - Etiology:  Renovascular, has atrophic left kidney.   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   [facility]

## 2020-05-10 LAB
ALBUMIN SERPL-MCNC: 3 G/DL (ref 3.4–5)
ANION GAP SERPL CALCULATED.3IONS-SCNC: 8 MMOL/L (ref 3–16)
BUN BLDV-MCNC: 60 MG/DL (ref 7–20)
CALCIUM SERPL-MCNC: 8 MG/DL (ref 8.3–10.6)
CHLORIDE BLD-SCNC: 112 MMOL/L (ref 99–110)
CO2: 26 MMOL/L (ref 21–32)
CREAT SERPL-MCNC: 2.7 MG/DL (ref 0.8–1.3)
GFR AFRICAN AMERICAN: 27
GFR NON-AFRICAN AMERICAN: 23
GLUCOSE BLD-MCNC: 88 MG/DL (ref 70–99)
HCT VFR BLD CALC: 26.6 % (ref 40.5–52.5)
HEMOGLOBIN: 9 G/DL (ref 13.5–17.5)
MAGNESIUM: 2.1 MG/DL (ref 1.8–2.4)
MCH RBC QN AUTO: 30.8 PG (ref 26–34)
MCHC RBC AUTO-ENTMCNC: 34 G/DL (ref 31–36)
MCV RBC AUTO: 90.5 FL (ref 80–100)
PDW BLD-RTO: 17.3 % (ref 12.4–15.4)
PHOSPHORUS: 2.1 MG/DL (ref 2.5–4.9)
PLATELET # BLD: 58 K/UL (ref 135–450)
PMV BLD AUTO: 8.8 FL (ref 5–10.5)
POTASSIUM SERPL-SCNC: 4 MMOL/L (ref 3.5–5.1)
RBC # BLD: 2.94 M/UL (ref 4.2–5.9)
REPORT: NORMAL
SARS-COV-2: NOT DETECTED
SODIUM BLD-SCNC: 146 MMOL/L (ref 136–145)
THIS TEST SENT TO: NORMAL
WBC # BLD: 6 K/UL (ref 4–11)

## 2020-05-10 PROCEDURE — 36415 COLL VENOUS BLD VENIPUNCTURE: CPT

## 2020-05-10 PROCEDURE — 6370000000 HC RX 637 (ALT 250 FOR IP): Performed by: INTERNAL MEDICINE

## 2020-05-10 PROCEDURE — 80069 RENAL FUNCTION PANEL: CPT

## 2020-05-10 PROCEDURE — 83735 ASSAY OF MAGNESIUM: CPT

## 2020-05-10 PROCEDURE — 85027 COMPLETE CBC AUTOMATED: CPT

## 2020-05-10 PROCEDURE — 1200000000 HC SEMI PRIVATE

## 2020-05-10 PROCEDURE — 2580000003 HC RX 258: Performed by: INTERNAL MEDICINE

## 2020-05-10 RX ADMIN — FLUOXETINE 20 MG: 20 CAPSULE ORAL at 08:17

## 2020-05-10 RX ADMIN — DONEPEZIL HYDROCHLORIDE 10 MG: 5 TABLET, FILM COATED ORAL at 20:07

## 2020-05-10 RX ADMIN — SODIUM CHLORIDE, POTASSIUM CHLORIDE, SODIUM LACTATE AND CALCIUM CHLORIDE: 600; 310; 30; 20 INJECTION, SOLUTION INTRAVENOUS at 05:49

## 2020-05-10 RX ADMIN — MEMANTINE 5 MG: 5 TABLET ORAL at 08:17

## 2020-05-10 RX ADMIN — MEMANTINE 5 MG: 5 TABLET ORAL at 20:07

## 2020-05-10 RX ADMIN — Medication 10 ML: at 20:07

## 2020-05-10 RX ADMIN — ATORVASTATIN CALCIUM 20 MG: 10 TABLET, FILM COATED ORAL at 08:17

## 2020-05-10 ASSESSMENT — PAIN SCALES - GENERAL: PAINLEVEL_OUTOF10: 0

## 2020-05-10 NOTE — PROGRESS NOTES
Hospitalist Progress Note      PCP: Isha Galloway MD    Date of Admission: 5/6/2020    Chief Complaint: Weakness    Subjective: no new c/o. Medications:  Reviewed    Infusion Medications    lactated ringers 100 mL/hr at 05/10/20 0549     Scheduled Medications    donepezil  10 mg Oral Nightly    FLUoxetine  20 mg Oral Daily    memantine  5 mg Oral BID    metoprolol succinate  12.5 mg Oral Nightly    atorvastatin  20 mg Oral Daily    sodium chloride flush  10 mL Intravenous 2 times per day    levothyroxine  175 mcg Oral Daily     PRN Meds: sodium chloride flush, acetaminophen **OR** acetaminophen, promethazine **OR** [DISCONTINUED] ondansetron      Intake/Output Summary (Last 24 hours) at 5/10/2020 075  Last data filed at 5/9/2020 0945  Gross per 24 hour   Intake 60 ml   Output 275 ml   Net -215 ml       Physical Exam Performed:    BP (!) 153/71   Pulse 54   Temp 98.3 °F (36.8 °C) (Oral)   Resp 16   Ht 5' 9\" (1.753 m)   Wt 128 lb 6.4 oz (58.2 kg)   SpO2 98%   BMI 18.96 kg/m²     General appearance: No apparent distress, appears stated age and cooperative. HEENT: Pupils equal, round, and reactive to light. Conjunctivae/corneas clear. Neck: Supple, with full range of motion. No jugular venous distention. Trachea midline. Respiratory:  Normal respiratory effort. Clear to auscultation, bilaterally without Rales/Wheezes/Rhonchi. Cardiovascular: Regular rate and rhythm with normal S1/S2 without murmurs, rubs or gallops. Abdomen: Soft, non-tender, non-distended with normal bowel sounds. Musculoskeletal: No clubbing, cyanosis or edema bilaterally. Full range of motion without deformity. Skin: Skin color, texture, turgor normal.  No rashes or lesions. Neurologic:  Neurovascularly intact without any focal sensory/motor deficits.  Cranial nerves: II-XII intact, grossly non-focal.  Psychiatric: Alert and oriented, thought content appropriate, normal insight  Capillary Refill: Brisk,< 3 seconds Peripheral Pulses: +2 palpable, equal bilaterally       Labs:   Recent Labs     05/08/20  0505 05/09/20  0554 05/10/20  0544   WBC 6.0 6.2 6.0   HGB 9.6* 10.0* 9.0*   HCT 28.5* 29.1* 26.6*   PLT 70* 70* 58*     Recent Labs     05/08/20  0505 05/09/20  0554 05/10/20  0544    146* 146*   K 3.7 4.2 4.0    111* 112*   CO2 22 25 26   BUN 88* 71* 60*   CREATININE 5.3* 3.6* 2.7*   CALCIUM 7.8* 8.1* 8.0*   PHOS 3.9 3.1 2.1*     No results for input(s): AST, ALT, BILIDIR, BILITOT, ALKPHOS in the last 72 hours. No results for input(s): INR in the last 72 hours. Recent Labs     05/08/20  0505   TROPONINI 0.08*       Urinalysis:      Lab Results   Component Value Date    NITRU Negative 05/06/2020    WBCUA 0-2 01/29/2020    BACTERIA 1+ 01/29/2020    RBCUA 3-5 01/29/2020    BLOODU Negative 05/06/2020    SPECGRAV 1.020 05/06/2020    GLUCOSEU Negative 05/06/2020    GLUCOSEU Negative 12/19/2011       Consults:    IP CONSULT TO NEPHROLOGY  IP CONSULT TO HOSPITALIST  IP CONSULT TO DIETITIAN      Assessment/Plan:    Active Hospital Problems    Diagnosis    ARF (acute renal failure) (HCC) [N17.9]    HTN (hypertension) [I10]    Hyperlipidemia [E78.5]    Dementia ([facility] [address] [F03.90]    Mild malnutrition ([facility] [address] [E44.1]    Hypothyroid [E03.9]         ARF - w/ elevated BUN/Cr ratio c/w pre-renal azotemia. Given IVF hydration and follow serial labs - continues to improve. Reviewed and documented as above. Nephrology consulted and appreciated - will likely avoid HD. HyperKalemia - likely 2nd to above - given Kayexalate. Follow serial labs. Reviewed and documented as above. HTN - w/out known CAD and no evidence of active signs/sxs of ischemia/failure. Currently controlled on home meds w/ vitals reviewed and documented as above. HyperLipidemia - controlled on home Statin. Continue, w/ f/u and med adjustment w/ PCP    HypoThyroid - clinically euthyroid on oral replacement therapy.  Continue, w/ outpt monitoring as previously arranged. Anemia - etiology clinically unable to determine, w/out evidence of active bleeding/hemolysis. Stable and asymptomatic w/out indication for transfusion. Follow serial labs. Reviewed and documented as above. Troponin elevation - of unclear clinical significance w/ etiology clinically unable to determine but likely 2nd to ARF, w/out signs/sxs of active ischemia. Dementia - w/out behavioral disturbance. Controlled on home medication regimen - continued. Continue supportive care and redirection as needed. Malnutrition - severe protein/calorie malnutrition. Likely 2nd to poor intake. Supplemental feeding/nutritional supplements as able.        DVT Prophylaxis: IPC  Diet: DIET RENAL;  Dietary Nutrition Supplements: Renal Oral Supplement  Code Status: Full Code      PT/OT Eval Status: seen w/ recs for SNF. Dispo - certainly here over the weekend, pending clinical improvement and subspecialty recs.  COVID surveillance ordered and pending by DCP for placement    Artem Ding MD

## 2020-05-10 NOTE — PROGRESS NOTES
volume depletion, he is making urine with volume and has a guillory  - Clinical:  Scr 7.4 -> 2.7, very good response to fluids. Seems he will trend to prior baseline and avoid dialysis      Chronic Kidney Disease:  Stage 4  - Etiology:  Renovascular, has atrophic left kidney.   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   [facility]

## 2020-05-11 LAB
ALBUMIN SERPL-MCNC: 2.9 G/DL (ref 3.4–5)
ANION GAP SERPL CALCULATED.3IONS-SCNC: 8 MMOL/L (ref 3–16)
BUN BLDV-MCNC: 49 MG/DL (ref 7–20)
CALCIUM SERPL-MCNC: 8.1 MG/DL (ref 8.3–10.6)
CHLORIDE BLD-SCNC: 110 MMOL/L (ref 99–110)
CO2: 28 MMOL/L (ref 21–32)
CREAT SERPL-MCNC: 2.3 MG/DL (ref 0.8–1.3)
GFR AFRICAN AMERICAN: 33
GFR NON-AFRICAN AMERICAN: 27
GLUCOSE BLD-MCNC: 100 MG/DL (ref 70–99)
HCT VFR BLD CALC: 26.5 % (ref 40.5–52.5)
HEMOGLOBIN: 8.8 G/DL (ref 13.5–17.5)
MCH RBC QN AUTO: 30.4 PG (ref 26–34)
MCHC RBC AUTO-ENTMCNC: 33.2 G/DL (ref 31–36)
MCV RBC AUTO: 91.4 FL (ref 80–100)
PDW BLD-RTO: 17.7 % (ref 12.4–15.4)
PHOSPHORUS: 1.9 MG/DL (ref 2.5–4.9)
PLATELET # BLD: 61 K/UL (ref 135–450)
PMV BLD AUTO: 8.8 FL (ref 5–10.5)
POTASSIUM SERPL-SCNC: 4.3 MMOL/L (ref 3.5–5.1)
RBC # BLD: 2.9 M/UL (ref 4.2–5.9)
SODIUM BLD-SCNC: 146 MMOL/L (ref 136–145)
WBC # BLD: 6 K/UL (ref 4–11)

## 2020-05-11 PROCEDURE — 97530 THERAPEUTIC ACTIVITIES: CPT

## 2020-05-11 PROCEDURE — 80069 RENAL FUNCTION PANEL: CPT

## 2020-05-11 PROCEDURE — 36415 COLL VENOUS BLD VENIPUNCTURE: CPT

## 2020-05-11 PROCEDURE — 6370000000 HC RX 637 (ALT 250 FOR IP): Performed by: INTERNAL MEDICINE

## 2020-05-11 PROCEDURE — 2580000003 HC RX 258: Performed by: INTERNAL MEDICINE

## 2020-05-11 PROCEDURE — 85027 COMPLETE CBC AUTOMATED: CPT

## 2020-05-11 PROCEDURE — 1200000000 HC SEMI PRIVATE

## 2020-05-11 PROCEDURE — 97116 GAIT TRAINING THERAPY: CPT

## 2020-05-11 RX ORDER — DEXTROSE MONOHYDRATE 50 MG/ML
INJECTION, SOLUTION INTRAVENOUS CONTINUOUS
Status: DISCONTINUED | OUTPATIENT
Start: 2020-05-11 | End: 2020-05-12

## 2020-05-11 RX ADMIN — MEMANTINE 5 MG: 5 TABLET ORAL at 20:14

## 2020-05-11 RX ADMIN — FLUOXETINE 20 MG: 20 CAPSULE ORAL at 09:17

## 2020-05-11 RX ADMIN — DONEPEZIL HYDROCHLORIDE 10 MG: 5 TABLET, FILM COATED ORAL at 20:14

## 2020-05-11 RX ADMIN — MEMANTINE 5 MG: 5 TABLET ORAL at 09:17

## 2020-05-11 RX ADMIN — ATORVASTATIN CALCIUM 20 MG: 10 TABLET, FILM COATED ORAL at 09:17

## 2020-05-11 RX ADMIN — DEXTROSE MONOHYDRATE: 50 INJECTION, SOLUTION INTRAVENOUS at 13:59

## 2020-05-11 RX ADMIN — Medication 10 ML: at 09:17

## 2020-05-11 ASSESSMENT — PAIN SCALES - GENERAL
PAINLEVEL_OUTOF10: 0
PAINLEVEL_OUTOF10: 0

## 2020-05-11 NOTE — CARE COORDINATION
CM spoke to [facility] [place] with [facility] and they can accept pt, note that COVID-19 test is resulted negative. There are no barriers to pt discharging to SNF.  CM following-Paula Astorga RN

## 2020-05-11 NOTE — PROGRESS NOTES
restrictions: High fall risk, up as tolerated,AVAYS video monitering  Subjective   General  Chart Reviewed: Yes  Additional Pertinent Hx: Patient is an 83 y/o male admitted 5/7 from [address] [place] [address] after being found with increasing fatigue. Patient diagnosed with acute renal failure. Chest x-ray (-) for acute findings. PMH significant for HTN, CAD, PAD, cognitive impairment, left TKA, CABG. Response To Previous Treatment: Patient with no complaints from previous session. Family / Caregiver Present: No  Referring Practitioner: Johnathon Mcgowan MD  Subjective  Subjective: Patient agreeable to PT treatment. General Comment  Comments: Patient supine in bed upon arrival, RN cleared patient for treatment session. Pain Screening  Patient Currently in Pain: Denies  Vital Signs  Patient Currently in Pain: Denies       Orientation  Orientation  Overall Orientation Status: Impaired  Orientation Level: Oriented to place;Oriented to person;Disoriented to time;Disoriented to situation  Cognition   Cognition  Overall Cognitive Status: Exceptions  Arousal/Alertness: Delayed responses to stimuli  Following Commands:  Follows one step commands with increased time  Attention Span: Attends with cues to redirect  Memory: Decreased short term memory;Decreased recall of recent events;Decreased recall of precautions  Safety Judgement: Decreased awareness of need for assistance;Decreased awareness of need for safety  Problem Solving: Decreased awareness of errors  Insights: Decreased awareness of deficits  Initiation: Requires cues for some  Sequencing: Requires cues for some  Objective   Bed mobility  Supine to Sit: Minimal assistance(assist with bringing trunk to upright sitting position, increased time to complete tasks, head of bed slightly elevated)  Scooting: Stand by assistance(to EOB)  Comment: patient sitting up in bedside chair at end of session  Transfers  Sit to Stand: Contact guard assistance(from EOB, verbal cues for Patient/Caregiver Education & Training  Safety Devices  Type of devices: Left in chair, Chair alarm in place, Call light within reach, Nurse notified, All fall risk precautions in place, Gait belt     Therapy Time   Individual Concurrent Group Co-treatment   Time In 1130         Time Out 1156         Minutes 26         Timed Code Treatment Minutes: 26 Minutes     If patient is discharged from the hospital prior to next treatment session, this note will serve as the discharge summary.    Onofre SMITH [address]

## 2020-05-11 NOTE — PROGRESS NOTES
person;Oriented to place; Disoriented to time;Disoriented to situation  Objective    ADL  Grooming: Contact guard assistance(standing at sink to wash face & hands & comb hair)  LE Dressing: Contact guard assistance(standing to manage briefs for toileting)  Toileting: Supervision        Balance  Sitting Balance: Supervision  Standing Balance: Contact guard assistance(with RW)  Standing Balance  Time: 1-5 minutes x 2   Activity: bathroom & functional mobility   Functional Mobility  Functional - Mobility Device: Rolling Walker  Activity: To/from bathroom  Assist Level: Contact guard assistance  Functional Mobility Comments: cues to manage RW safely  Toilet Transfers  Toilet - Technique: Ambulating(CGA with RW)  Equipment Used: Grab bars  Toilet Transfer: Contact guard assistance  Bed mobility  Supine to Sit: Contact guard assistance  Sit to Supine: Contact guard assistance  Transfers  Sit to stand: Contact guard assistance  Stand to sit: Contact guard assistance              Cognition  Overall Cognitive Status: Exceptions(pleasant, cooperative, more verbal)  Arousal/Alertness: Appropriate responses to stimuli  Following Commands:  Follows one step commands consistently  Attention Span: Attends with cues to redirect  Memory: Decreased recall of precautions;Decreased long term memory;Decreased recall of recent events;Decreased short term memory  Safety Judgement: Decreased awareness of need for safety;Decreased awareness of need for assistance  Problem Solving: Decreased awareness of errors  Insights: Decreased awareness of deficits  Initiation: Requires cues for some  Sequencing: Does not require cues        Type of ROM/Therapeutic Exercise: AROM BUE seated in chair  Hand flex/ext: x   15 Reps  Wrist flex/ext:  X  15 Reps  Elbow flex/ext:  x  15  Reps  Forearm sup/pron:  x 15   Reps  Shld flex/ext:  x  15  Reps     Plan   Plan  Times per week: 3-5x/ week   Current Treatment Recommendations: ROM, Balance Training,

## 2020-05-12 VITALS
RESPIRATION RATE: 16 BRPM | OXYGEN SATURATION: 99 % | HEIGHT: 69 IN | SYSTOLIC BLOOD PRESSURE: 112 MMHG | DIASTOLIC BLOOD PRESSURE: 52 MMHG | BODY MASS INDEX: 19.23 KG/M2 | TEMPERATURE: 98 F | WEIGHT: 129.8 LBS | HEART RATE: 60 BPM

## 2020-05-12 LAB
ANION GAP SERPL CALCULATED.3IONS-SCNC: 8 MMOL/L (ref 3–16)
BUN BLDV-MCNC: 39 MG/DL (ref 7–20)
CALCIUM SERPL-MCNC: 7.9 MG/DL (ref 8.3–10.6)
CHLORIDE BLD-SCNC: 105 MMOL/L (ref 99–110)
CO2: 28 MMOL/L (ref 21–32)
CREAT SERPL-MCNC: 2 MG/DL (ref 0.8–1.3)
GFR AFRICAN AMERICAN: 39
GFR NON-AFRICAN AMERICAN: 32
GLUCOSE BLD-MCNC: 115 MG/DL (ref 70–99)
POTASSIUM SERPL-SCNC: 3.7 MMOL/L (ref 3.5–5.1)
SODIUM BLD-SCNC: 141 MMOL/L (ref 136–145)

## 2020-05-12 PROCEDURE — 80048 BASIC METABOLIC PNL TOTAL CA: CPT

## 2020-05-12 PROCEDURE — 36415 COLL VENOUS BLD VENIPUNCTURE: CPT

## 2020-05-12 PROCEDURE — 97110 THERAPEUTIC EXERCISES: CPT

## 2020-05-12 PROCEDURE — 97535 SELF CARE MNGMENT TRAINING: CPT

## 2020-05-12 PROCEDURE — 6370000000 HC RX 637 (ALT 250 FOR IP): Performed by: INTERNAL MEDICINE

## 2020-05-12 RX ADMIN — MEMANTINE 5 MG: 5 TABLET ORAL at 08:28

## 2020-05-12 RX ADMIN — ATORVASTATIN CALCIUM 20 MG: 10 TABLET, FILM COATED ORAL at 08:28

## 2020-05-12 RX ADMIN — FLUOXETINE 20 MG: 20 CAPSULE ORAL at 08:28

## 2020-05-12 ASSESSMENT — PAIN SCALES - GENERAL
PAINLEVEL_OUTOF10: 0

## 2020-05-12 NOTE — DISCHARGE INSTR - COC
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.9    Murmur, cardiac R01.1    Tobacco abuse Z72.0    Bilateral low back pain with right-sided sciatica M54.41    NSTEMI (non-ST elevated myocardial infarction) ([facility]) I21.4    Ischemic cardiomyopathy I25.5    Left bundle branch block I44.7    Nonrheumatic mitral valve regurgitation I34.0    Bradycardia R00.1    STEPHANIE (acute kidney injury) ([facility]) N17.9    ARF (acute renal failure) ([facility]) N17.9    HTN (hypertension) I10    Hyperlipidemia E78.5    Dementia ([facility]) F03.90    Mild malnutrition ([facility]) E44.1       Isolation/Infection:   Isolation          No Isolation        Patient Infection Status     Infection Onset Added Last Indicated Last Indicated By Review Planned Expiration Resolved Resolved By    None active    Resolved    C-diff Rule Out 03/27/20 03/27/20 03/27/20 GI Bacterial Pathogens By PCR (Ordered)   03/28/20 Lesly Mae RN    C-diff Rule Out 03/26/20 03/26/20 03/26/20 Clostridium difficile toxin/antigen (Ordered)   03/27/20 Rule-Out Test Resulted          Nurse Assessment:  Last Vital Signs: BP (!) 112/52   Pulse 60   Temp 98 °F (36.7 °C) (Oral)   Resp 16   Ht 5' 9\" (1.753 m)   Wt 129 lb 12.8 oz (58.9 kg)   SpO2 99%   BMI 19.17 kg/m²     Last documented pain score (0-10 scale): Pain Level: 0  Last Weight:   Wt Readings from Last 1 Encounters:   05/12/20 129 lb 12.8 oz (58.9 kg)     Mental Status:  alert and Dementia at baseline oriented to person and place    IV Access:  - None    Nursing Mobility/ADLs:  Walking   Assisted  Transfer  Assisted  Bathing  Assisted  Dressing  Assisted  Toileting  Assisted  Feeding  Assisted  Med Admin  Assisted  Med Delivery   whole    Wound Care Documentation and Therapy:        Elimination:  Continence:   · Bowel:  Yes  · Bladder: Yes  Urinary Catheter: Removal Date ***   Colostomy/Ileostomy/Ileal Conduit: No       Date of Last BM: ***    Intake/Output Summary (Last

## 2020-05-12 NOTE — PROGRESS NOTES
Dr. Shady Caceres  Diagnosis: STEPHANIE, confused    Subjective  Subjective: Pt resting in bed, agreeable to OT treatment with encouragement due to pt reported \"upset stomach. \"    Vital Signs  Patient Currently in Pain: Denies     Orientation  Orientation  Orientation Level: Oriented to person;Disoriented to situation;Disoriented to time     Objective    ADL  LE Dressing: Moderate assistance(posterior lean when donning pants over hips and tying waistband)  Additional Comments: Pt declined further ADLs. Balance  Sitting Balance: Supervision  Standing Balance: Moderate assistance(heavy posterior lean with LE dressing, min-mod A with RUE handheld assist to step from EOB to bedside chair)  Standing Balance  Activity: LE dressing, stand step transfer    Bed mobility  Supine to Sit: Stand by assistance(to pt L with HOB elevated)     Transfers  Sit to stand: Moderate assistance  Stand to sit: Minimal assistance     Cognition  Overall Cognitive Status: Exceptions  Following Commands:  Follows one step commands with repetition  Attention Span: Attends with cues to redirect  Memory: Decreased recall of precautions;Decreased long term memory;Decreased recall of recent events;Decreased short term memory  Safety Judgement: Decreased awareness of need for safety;Decreased awareness of need for assistance  Insights: Decreased awareness of deficits  Initiation: Requires cues for some  Sequencing: Requires cues for some     Type of ROM/Therapeutic Exercise  Type of ROM/Therapeutic Exercise: AROM  Exercises  Shoulder Flexion: 20x  Horizontal ABduction: 20x  Horizontal ADduction: 20x  Elbow Flexion: 20x  Elbow Extension: 20x  Supination: 20x  Pronation: 20x  Wrist Flexion: 20x  Wrist Extension: 20x  Grasp/Release: 20x     Plan   Plan  Times per week: 3-5x/ week   Current Treatment Recommendations: ROM, Balance Training, Self-Care / ADL, Cognitive Reorientation, Functional Mobility Training, Endurance Training    AM-PAC Score  AM-PAC Inpatient

## 2020-05-28 ENCOUNTER — APPOINTMENT (OUTPATIENT)
Dept: CT IMAGING | Age: 85
DRG: 682 | End: 2020-05-28
Payer: MEDICARE

## 2020-05-28 ENCOUNTER — APPOINTMENT (OUTPATIENT)
Dept: GENERAL RADIOLOGY | Age: 85
DRG: 682 | End: 2020-05-28
Payer: MEDICARE

## 2020-05-28 ENCOUNTER — HOSPITAL ENCOUNTER (INPATIENT)
Age: 85
LOS: 4 days | Discharge: HOSPICE | DRG: 682 | End: 2020-06-01
Attending: EMERGENCY MEDICINE | Admitting: INTERNAL MEDICINE
Payer: MEDICARE

## 2020-05-28 PROBLEM — R19.7 DIARRHEA: Status: ACTIVE | Noted: 2020-05-28

## 2020-05-28 PROBLEM — E87.5 HYPERKALEMIA: Status: ACTIVE | Noted: 2020-05-28

## 2020-05-28 LAB
A/G RATIO: 1.3 (ref 1.1–2.2)
A/G RATIO: 1.5 (ref 1.1–2.2)
ALBUMIN SERPL-MCNC: 2.6 G/DL (ref 3.4–5)
ALBUMIN SERPL-MCNC: 3.4 G/DL (ref 3.4–5)
ALP BLD-CCNC: 116 U/L (ref 40–129)
ALP BLD-CCNC: 85 U/L (ref 40–129)
ALT SERPL-CCNC: 1248 U/L (ref 10–40)
ALT SERPL-CCNC: 945 U/L (ref 10–40)
ANION GAP SERPL CALCULATED.3IONS-SCNC: 20 MMOL/L (ref 3–16)
ANION GAP SERPL CALCULATED.3IONS-SCNC: 25 MMOL/L (ref 3–16)
ANISOCYTOSIS: ABNORMAL
AST SERPL-CCNC: 577 U/L (ref 15–37)
AST SERPL-CCNC: 759 U/L (ref 15–37)
ATYPICAL LYMPHOCYTE RELATIVE PERCENT: 1 % (ref 0–6)
BACTERIA: ABNORMAL /HPF
BANDED NEUTROPHILS RELATIVE PERCENT: 40 % (ref 0–7)
BASOPHILS ABSOLUTE: 0 K/UL (ref 0–0.2)
BASOPHILS RELATIVE PERCENT: 0 %
BILIRUB SERPL-MCNC: 1.2 MG/DL (ref 0–1)
BILIRUB SERPL-MCNC: 1.6 MG/DL (ref 0–1)
BILIRUBIN URINE: NEGATIVE
BLOOD, URINE: ABNORMAL
BUN BLDV-MCNC: 126 MG/DL (ref 7–20)
BUN BLDV-MCNC: 135 MG/DL (ref 7–20)
CALCIUM SERPL-MCNC: 4.9 MG/DL (ref 8.3–10.6)
CALCIUM SERPL-MCNC: 5.3 MG/DL (ref 8.3–10.6)
CHLORIDE BLD-SCNC: 103 MMOL/L (ref 99–110)
CHLORIDE BLD-SCNC: 106 MMOL/L (ref 99–110)
CLARITY: ABNORMAL
CO2: 13 MMOL/L (ref 21–32)
CO2: 14 MMOL/L (ref 21–32)
COLOR: YELLOW
CREAT SERPL-MCNC: 6.5 MG/DL (ref 0.8–1.3)
CREAT SERPL-MCNC: 7.3 MG/DL (ref 0.8–1.3)
EKG ATRIAL RATE: 44 BPM
EKG DIAGNOSIS: NORMAL
EKG P AXIS: 63 DEGREES
EKG P-R INTERVAL: 288 MS
EKG Q-T INTERVAL: 708 MS
EKG QRS DURATION: 168 MS
EKG QTC CALCULATION (BAZETT): 605 MS
EKG R AXIS: -14 DEGREES
EKG T AXIS: 237 DEGREES
EKG VENTRICULAR RATE: 44 BPM
EOSINOPHILS ABSOLUTE: 0 K/UL (ref 0–0.6)
EOSINOPHILS RELATIVE PERCENT: 0 %
EPITHELIAL CELLS, UA: ABNORMAL /HPF (ref 0–5)
GFR AFRICAN AMERICAN: 10
GFR AFRICAN AMERICAN: 9
GFR NON-AFRICAN AMERICAN: 7
GFR NON-AFRICAN AMERICAN: 8
GLOBULIN: 2 G/DL
GLOBULIN: 2.2 G/DL
GLUCOSE BLD-MCNC: 104 MG/DL (ref 70–99)
GLUCOSE BLD-MCNC: 198 MG/DL (ref 70–99)
GLUCOSE URINE: NEGATIVE MG/DL
HCT VFR BLD CALC: 37.3 % (ref 40.5–52.5)
HEMOGLOBIN: 11.7 G/DL (ref 13.5–17.5)
KETONES, URINE: NEGATIVE MG/DL
LACTIC ACID: 2.3 MMOL/L (ref 0.4–2)
LEUKOCYTE ESTERASE, URINE: ABNORMAL
LYMPHOCYTES ABSOLUTE: 0.6 K/UL (ref 1–5.1)
LYMPHOCYTES RELATIVE PERCENT: 4 %
MACROCYTES: ABNORMAL
MCH RBC QN AUTO: 31.7 PG (ref 26–34)
MCHC RBC AUTO-ENTMCNC: 31.3 G/DL (ref 31–36)
MCV RBC AUTO: 101.3 FL (ref 80–100)
MICROSCOPIC EXAMINATION: YES
MONOCYTES ABSOLUTE: 0.3 K/UL (ref 0–1.3)
MONOCYTES RELATIVE PERCENT: 3 %
MYELOCYTE PERCENT: 1 %
NEUTROPHILS ABSOLUTE: 10.5 K/UL (ref 1.7–7.7)
NEUTROPHILS RELATIVE PERCENT: 51 %
NITRITE, URINE: NEGATIVE
NUCLEATED RED BLOOD CELLS: 3 /100 WBC
NUCLEATED RED BLOOD CELLS: 3 /100 WBC
PDW BLD-RTO: 24.2 % (ref 12.4–15.4)
PH UA: 5.5 (ref 5–8)
PLATELET # BLD: 51 K/UL (ref 135–450)
PLATELET SLIDE REVIEW: ABNORMAL
PMV BLD AUTO: 11.9 FL (ref 5–10.5)
POLYCHROMASIA: ABNORMAL
POTASSIUM SERPL-SCNC: 5.9 MMOL/L (ref 3.5–5.1)
POTASSIUM SERPL-SCNC: 6.5 MMOL/L (ref 3.5–5.1)
PROTEIN UA: 100 MG/DL
RBC # BLD: 3.68 M/UL (ref 4.2–5.9)
RBC UA: ABNORMAL /HPF (ref 0–4)
SLIDE REVIEW: ABNORMAL
SODIUM BLD-SCNC: 140 MMOL/L (ref 136–145)
SODIUM BLD-SCNC: 141 MMOL/L (ref 136–145)
SPECIFIC GRAVITY UA: 1.02 (ref 1–1.03)
SPECIMEN STATUS: NORMAL
TOTAL PROTEIN: 4.6 G/DL (ref 6.4–8.2)
TOTAL PROTEIN: 5.6 G/DL (ref 6.4–8.2)
TROPONIN: 0.31 NG/ML
URINE REFLEX TO CULTURE: YES
URINE TYPE: ABNORMAL
UROBILINOGEN, URINE: 0.2 E.U./DL
WBC # BLD: 11.4 K/UL (ref 4–11)
WBC UA: ABNORMAL /HPF (ref 0–5)

## 2020-05-28 PROCEDURE — 6370000000 HC RX 637 (ALT 250 FOR IP): Performed by: EMERGENCY MEDICINE

## 2020-05-28 PROCEDURE — 99285 EMERGENCY DEPT VISIT HI MDM: CPT

## 2020-05-28 PROCEDURE — 6370000000 HC RX 637 (ALT 250 FOR IP): Performed by: INTERNAL MEDICINE

## 2020-05-28 PROCEDURE — 70450 CT HEAD/BRAIN W/O DYE: CPT

## 2020-05-28 PROCEDURE — 93010 ELECTROCARDIOGRAM REPORT: CPT | Performed by: INTERNAL MEDICINE

## 2020-05-28 PROCEDURE — 81001 URINALYSIS AUTO W/SCOPE: CPT

## 2020-05-28 PROCEDURE — 83605 ASSAY OF LACTIC ACID: CPT

## 2020-05-28 PROCEDURE — 2580000003 HC RX 258: Performed by: INTERNAL MEDICINE

## 2020-05-28 PROCEDURE — 2500000003 HC RX 250 WO HCPCS: Performed by: EMERGENCY MEDICINE

## 2020-05-28 PROCEDURE — 84484 ASSAY OF TROPONIN QUANT: CPT

## 2020-05-28 PROCEDURE — 1200000000 HC SEMI PRIVATE

## 2020-05-28 PROCEDURE — 87077 CULTURE AEROBIC IDENTIFY: CPT

## 2020-05-28 PROCEDURE — U0003 INFECTIOUS AGENT DETECTION BY NUCLEIC ACID (DNA OR RNA); SEVERE ACUTE RESPIRATORY SYNDROME CORONAVIRUS 2 (SARS-COV-2) (CORONAVIRUS DISEASE [COVID-19]), AMPLIFIED PROBE TECHNIQUE, MAKING USE OF HIGH THROUGHPUT TECHNOLOGIES AS DESCRIBED BY CMS-2020-01-R: HCPCS

## 2020-05-28 PROCEDURE — 87086 URINE CULTURE/COLONY COUNT: CPT

## 2020-05-28 PROCEDURE — 93005 ELECTROCARDIOGRAM TRACING: CPT | Performed by: EMERGENCY MEDICINE

## 2020-05-28 PROCEDURE — 6360000002 HC RX W HCPCS: Performed by: EMERGENCY MEDICINE

## 2020-05-28 PROCEDURE — 96374 THER/PROPH/DIAG INJ IV PUSH: CPT

## 2020-05-28 PROCEDURE — 87040 BLOOD CULTURE FOR BACTERIA: CPT

## 2020-05-28 PROCEDURE — 80053 COMPREHEN METABOLIC PANEL: CPT

## 2020-05-28 PROCEDURE — 2580000003 HC RX 258: Performed by: EMERGENCY MEDICINE

## 2020-05-28 PROCEDURE — 6360000002 HC RX W HCPCS: Performed by: INTERNAL MEDICINE

## 2020-05-28 PROCEDURE — 94640 AIRWAY INHALATION TREATMENT: CPT

## 2020-05-28 PROCEDURE — 71045 X-RAY EXAM CHEST 1 VIEW: CPT

## 2020-05-28 PROCEDURE — 85025 COMPLETE CBC W/AUTO DIFF WBC: CPT

## 2020-05-28 RX ORDER — SODIUM CHLORIDE 0.9 % (FLUSH) 0.9 %
10 SYRINGE (ML) INJECTION EVERY 12 HOURS SCHEDULED
Status: DISCONTINUED | OUTPATIENT
Start: 2020-05-28 | End: 2020-06-01 | Stop reason: HOSPADM

## 2020-05-28 RX ORDER — LEVOTHYROXINE SODIUM 175 UG/1
1 TABLET ORAL DAILY
Status: DISCONTINUED | OUTPATIENT
Start: 2020-05-28 | End: 2020-05-28 | Stop reason: CLARIF

## 2020-05-28 RX ORDER — ACETAMINOPHEN 325 MG/1
650 TABLET ORAL EVERY 6 HOURS PRN
COMMUNITY

## 2020-05-28 RX ORDER — FLUOXETINE HYDROCHLORIDE 20 MG/1
20 CAPSULE ORAL DAILY
Status: DISCONTINUED | OUTPATIENT
Start: 2020-05-29 | End: 2020-06-01 | Stop reason: HOSPADM

## 2020-05-28 RX ORDER — ASPIRIN 81 MG/1
81 TABLET ORAL DAILY
Status: DISCONTINUED | OUTPATIENT
Start: 2020-05-29 | End: 2020-06-01 | Stop reason: HOSPADM

## 2020-05-28 RX ORDER — ATORVASTATIN CALCIUM 10 MG/1
20 TABLET, FILM COATED ORAL DAILY
Status: DISCONTINUED | OUTPATIENT
Start: 2020-05-29 | End: 2020-06-01 | Stop reason: HOSPADM

## 2020-05-28 RX ORDER — LORAZEPAM 2 MG/ML
1 INJECTION INTRAMUSCULAR EVERY 4 HOURS PRN
Status: DISCONTINUED | OUTPATIENT
Start: 2020-05-28 | End: 2020-06-01 | Stop reason: HOSPADM

## 2020-05-28 RX ORDER — POLYETHYLENE GLYCOL 3350 17 G/17G
17 POWDER, FOR SOLUTION ORAL DAILY PRN
Status: DISCONTINUED | OUTPATIENT
Start: 2020-05-28 | End: 2020-06-01 | Stop reason: HOSPADM

## 2020-05-28 RX ORDER — PROMETHAZINE HYDROCHLORIDE 25 MG/1
12.5 TABLET ORAL EVERY 6 HOURS PRN
Status: DISCONTINUED | OUTPATIENT
Start: 2020-05-28 | End: 2020-06-01 | Stop reason: HOSPADM

## 2020-05-28 RX ORDER — DEXTROSE MONOHYDRATE 25 G/50ML
25 INJECTION, SOLUTION INTRAVENOUS ONCE
Status: COMPLETED | OUTPATIENT
Start: 2020-05-28 | End: 2020-05-28

## 2020-05-28 RX ORDER — ONDANSETRON 2 MG/ML
4 INJECTION INTRAMUSCULAR; INTRAVENOUS EVERY 6 HOURS PRN
Status: DISCONTINUED | OUTPATIENT
Start: 2020-05-28 | End: 2020-06-01 | Stop reason: HOSPADM

## 2020-05-28 RX ORDER — MEMANTINE HYDROCHLORIDE 5 MG/1
10 TABLET ORAL 2 TIMES DAILY
Status: DISCONTINUED | OUTPATIENT
Start: 2020-05-28 | End: 2020-05-29

## 2020-05-28 RX ORDER — SODIUM CHLORIDE 9 MG/ML
INJECTION, SOLUTION INTRAVENOUS CONTINUOUS
Status: DISCONTINUED | OUTPATIENT
Start: 2020-05-28 | End: 2020-05-28

## 2020-05-28 RX ORDER — 0.9 % SODIUM CHLORIDE 0.9 %
30 INTRAVENOUS SOLUTION INTRAVENOUS ONCE
Status: COMPLETED | OUTPATIENT
Start: 2020-05-28 | End: 2020-05-28

## 2020-05-28 RX ORDER — ACETAMINOPHEN 325 MG/1
650 TABLET ORAL EVERY 6 HOURS PRN
Status: DISCONTINUED | OUTPATIENT
Start: 2020-05-28 | End: 2020-06-01 | Stop reason: HOSPADM

## 2020-05-28 RX ORDER — HEPARIN SODIUM 5000 [USP'U]/ML
5000 INJECTION, SOLUTION INTRAVENOUS; SUBCUTANEOUS EVERY 8 HOURS SCHEDULED
Status: DISCONTINUED | OUTPATIENT
Start: 2020-05-28 | End: 2020-05-29

## 2020-05-28 RX ORDER — CALCIUM GLUCONATE 20 MG/ML
1 INJECTION, SOLUTION INTRAVENOUS ONCE
Status: COMPLETED | OUTPATIENT
Start: 2020-05-28 | End: 2020-05-28

## 2020-05-28 RX ORDER — 0.9 % SODIUM CHLORIDE 0.9 %
1000 INTRAVENOUS SOLUTION INTRAVENOUS ONCE
Status: COMPLETED | OUTPATIENT
Start: 2020-05-28 | End: 2020-05-28

## 2020-05-28 RX ORDER — IPRATROPIUM BROMIDE AND ALBUTEROL SULFATE 2.5; .5 MG/3ML; MG/3ML
1 SOLUTION RESPIRATORY (INHALATION) ONCE
Status: COMPLETED | OUTPATIENT
Start: 2020-05-28 | End: 2020-05-28

## 2020-05-28 RX ORDER — ACETAMINOPHEN 650 MG/1
650 SUPPOSITORY RECTAL EVERY 6 HOURS PRN
Status: DISCONTINUED | OUTPATIENT
Start: 2020-05-28 | End: 2020-06-01 | Stop reason: HOSPADM

## 2020-05-28 RX ORDER — SODIUM CHLORIDE 0.9 % (FLUSH) 0.9 %
10 SYRINGE (ML) INJECTION PRN
Status: DISCONTINUED | OUTPATIENT
Start: 2020-05-28 | End: 2020-06-01 | Stop reason: HOSPADM

## 2020-05-28 RX ORDER — DONEPEZIL HYDROCHLORIDE 5 MG/1
10 TABLET, FILM COATED ORAL NIGHTLY
Status: DISCONTINUED | OUTPATIENT
Start: 2020-05-28 | End: 2020-06-01 | Stop reason: HOSPADM

## 2020-05-28 RX ADMIN — DEXTROSE MONOHYDRATE 25 G: 25 INJECTION, SOLUTION INTRAVENOUS at 15:15

## 2020-05-28 RX ADMIN — SODIUM BICARBONATE 50 MEQ: 84 INJECTION INTRAVENOUS at 15:13

## 2020-05-28 RX ADMIN — IPRATROPIUM BROMIDE AND ALBUTEROL SULFATE 1 AMPULE: .5; 3 SOLUTION RESPIRATORY (INHALATION) at 15:22

## 2020-05-28 RX ADMIN — SODIUM CHLORIDE 1770 ML: 9 INJECTION, SOLUTION INTRAVENOUS at 14:30

## 2020-05-28 RX ADMIN — SODIUM CHLORIDE 1000 ML: 9 INJECTION, SOLUTION INTRAVENOUS at 12:37

## 2020-05-28 RX ADMIN — LORAZEPAM 1 MG: 2 INJECTION INTRAMUSCULAR; INTRAVENOUS at 18:54

## 2020-05-28 RX ADMIN — HEPARIN SODIUM 5000 UNITS: 5000 INJECTION INTRAVENOUS; SUBCUTANEOUS at 18:53

## 2020-05-28 RX ADMIN — PATIROMER 8.4 G: 8.4 POWDER, FOR SUSPENSION ORAL at 18:56

## 2020-05-28 RX ADMIN — SODIUM BICARBONATE: 84 INJECTION, SOLUTION INTRAVENOUS at 15:35

## 2020-05-28 RX ADMIN — INSULIN HUMAN 10 UNITS: 100 INJECTION, SOLUTION PARENTERAL at 15:15

## 2020-05-28 RX ADMIN — CALCIUM GLUCONATE 1 G: 20 INJECTION, SOLUTION INTRAVENOUS at 15:11

## 2020-05-28 RX ADMIN — CEFTRIAXONE SODIUM 1 G: 1 INJECTION, POWDER, FOR SOLUTION INTRAMUSCULAR; INTRAVENOUS at 14:30

## 2020-05-28 RX ADMIN — CALCIUM GLUCONATE 2 G: 98 INJECTION, SOLUTION INTRAVENOUS at 20:38

## 2020-05-28 RX ADMIN — VANCOMYCIN HYDROCHLORIDE 1250 MG: 10 INJECTION, POWDER, LYOPHILIZED, FOR SOLUTION INTRAVENOUS at 15:19

## 2020-05-28 ASSESSMENT — PAIN SCALES - PAIN ASSESSMENT IN ADVANCED DEMENTIA (PAINAD)
FACIALEXPRESSION: 2
CONSOLABILITY: 1
BODYLANGUAGE: 1
TOTALSCORE: 7
NEGVOCALIZATION: 2
BREATHING: 1

## 2020-05-28 ASSESSMENT — PAIN SCALES - GENERAL: PAINLEVEL_OUTOF10: 8

## 2020-05-28 ASSESSMENT — ENCOUNTER SYMPTOMS: DIARRHEA: 1

## 2020-05-28 NOTE — ED NOTES
Pt had moderate amount of black liquid stool. Specimen collected at bedside. [facility] [place] notified to page in patient provider.       Job Goldsmith RN  05/28/20 8544

## 2020-05-28 NOTE — ED NOTES
[facility] at [facility]  RE:sepsis, martina per Dr. Xavier Jenkins spoke with Dr. Xavier Jesus at [address]  05/28/20 [phone]

## 2020-05-28 NOTE — ED NOTES

## 2020-05-28 NOTE — ED NOTES
Placed temp sensing guillory cath per RN request. Using sterile technique. Pt tolerated well. Urine returned.  RN obtained specimen, labeled & sent to lab     [address] [place] [address]  05/28/20 7832

## 2020-05-28 NOTE — ED NOTES
[facility] contacted for further information on patient's symptoms. Per Cari Lin LPN, pt has had diarrhea for the last 2 days and abnormal BUN and Cr labs that were drawn this morning. Seth Connolly states pt's mentation is at baseline and that \"the moaning and groaning is normal\".       Alicia Pizarro RN  05/28/20 6926

## 2020-05-28 NOTE — ED PROVIDER NOTES
note were completed with a voice recognition program.  Efforts were made to edit the dictations but occasionally words aremis-transcribed. )    Mitch Wiggins MD (electronically signed)  Attending Emergency Physician           Mitch Wiggins MD  05/28/20 5040

## 2020-05-29 LAB
ALBUMIN SERPL-MCNC: 2.8 G/DL (ref 3.4–5)
ALBUMIN SERPL-MCNC: 2.8 G/DL (ref 3.4–5)
ANION GAP SERPL CALCULATED.3IONS-SCNC: 18 MMOL/L (ref 3–16)
ANION GAP SERPL CALCULATED.3IONS-SCNC: 18 MMOL/L (ref 3–16)
BUN BLDV-MCNC: 121 MG/DL (ref 7–20)
BUN BLDV-MCNC: 124 MG/DL (ref 7–20)
CALCIUM SERPL-MCNC: 5.3 MG/DL (ref 8.3–10.6)
CALCIUM SERPL-MCNC: 6 MG/DL (ref 8.3–10.6)
CHLORIDE BLD-SCNC: 104 MMOL/L (ref 99–110)
CHLORIDE BLD-SCNC: 104 MMOL/L (ref 99–110)
CO2: 18 MMOL/L (ref 21–32)
CO2: 19 MMOL/L (ref 21–32)
CREAT SERPL-MCNC: 6.5 MG/DL (ref 0.8–1.3)
CREAT SERPL-MCNC: 6.7 MG/DL (ref 0.8–1.3)
GFR AFRICAN AMERICAN: 10
GFR AFRICAN AMERICAN: 10
GFR NON-AFRICAN AMERICAN: 8
GFR NON-AFRICAN AMERICAN: 8
GLUCOSE BLD-MCNC: 112 MG/DL (ref 70–99)
GLUCOSE BLD-MCNC: 149 MG/DL (ref 70–99)
HCT VFR BLD CALC: 31.7 % (ref 40.5–52.5)
HEMOGLOBIN: 10.3 G/DL (ref 13.5–17.5)
MAGNESIUM: 2.8 MG/DL (ref 1.8–2.4)
MCH RBC QN AUTO: 32.5 PG (ref 26–34)
MCHC RBC AUTO-ENTMCNC: 32.4 G/DL (ref 31–36)
MCV RBC AUTO: 100.3 FL (ref 80–100)
PDW BLD-RTO: 24.6 % (ref 12.4–15.4)
PHOSPHORUS: 8.1 MG/DL (ref 2.5–4.9)
PHOSPHORUS: 8.6 MG/DL (ref 2.5–4.9)
PLATELET # BLD: 38 K/UL (ref 135–450)
PMV BLD AUTO: 12.3 FL (ref 5–10.5)
POTASSIUM SERPL-SCNC: 4.5 MMOL/L (ref 3.5–5.1)
POTASSIUM SERPL-SCNC: 4.5 MMOL/L (ref 3.5–5.1)
RBC # BLD: 3.16 M/UL (ref 4.2–5.9)
SARS-COV-2, PCR: NOT DETECTED
SODIUM BLD-SCNC: 140 MMOL/L (ref 136–145)
SODIUM BLD-SCNC: 141 MMOL/L (ref 136–145)
URINE CULTURE, ROUTINE: NORMAL
WBC # BLD: 10.6 K/UL (ref 4–11)

## 2020-05-29 PROCEDURE — 1200000000 HC SEMI PRIVATE

## 2020-05-29 PROCEDURE — 6360000002 HC RX W HCPCS: Performed by: INTERNAL MEDICINE

## 2020-05-29 PROCEDURE — 2580000003 HC RX 258: Performed by: INTERNAL MEDICINE

## 2020-05-29 PROCEDURE — 2500000003 HC RX 250 WO HCPCS: Performed by: INTERNAL MEDICINE

## 2020-05-29 PROCEDURE — 83735 ASSAY OF MAGNESIUM: CPT

## 2020-05-29 PROCEDURE — 80069 RENAL FUNCTION PANEL: CPT

## 2020-05-29 PROCEDURE — 85027 COMPLETE CBC AUTOMATED: CPT

## 2020-05-29 PROCEDURE — 36415 COLL VENOUS BLD VENIPUNCTURE: CPT

## 2020-05-29 RX ORDER — 0.9 % SODIUM CHLORIDE 0.9 %
1000 INTRAVENOUS SOLUTION INTRAVENOUS ONCE
Status: COMPLETED | OUTPATIENT
Start: 2020-05-29 | End: 2020-05-29

## 2020-05-29 RX ORDER — MEMANTINE HYDROCHLORIDE 5 MG/1
5 TABLET ORAL 2 TIMES DAILY
Status: DISCONTINUED | OUTPATIENT
Start: 2020-05-29 | End: 2020-06-01 | Stop reason: HOSPADM

## 2020-05-29 RX ADMIN — SODIUM BICARBONATE: 84 INJECTION, SOLUTION INTRAVENOUS at 01:50

## 2020-05-29 RX ADMIN — Medication 10 ML: at 23:50

## 2020-05-29 RX ADMIN — CEFTRIAXONE SODIUM 1 G: 1 INJECTION, POWDER, FOR SOLUTION INTRAMUSCULAR; INTRAVENOUS at 10:56

## 2020-05-29 RX ADMIN — Medication 10 ML: at 10:57

## 2020-05-29 RX ADMIN — HEPARIN SODIUM 5000 UNITS: 5000 INJECTION INTRAVENOUS; SUBCUTANEOUS at 00:33

## 2020-05-29 RX ADMIN — SODIUM BICARBONATE: 84 INJECTION, SOLUTION INTRAVENOUS at 23:50

## 2020-05-29 RX ADMIN — LORAZEPAM 1 MG: 2 INJECTION INTRAMUSCULAR; INTRAVENOUS at 08:21

## 2020-05-29 RX ADMIN — SODIUM CHLORIDE 1000 ML: 9 INJECTION, SOLUTION INTRAVENOUS at 10:54

## 2020-05-29 RX ADMIN — CALCIUM GLUCONATE 3 G: 98 INJECTION, SOLUTION INTRAVENOUS at 10:55

## 2020-05-29 RX ADMIN — CALCIUM GLUCONATE 3 G: 98 INJECTION, SOLUTION INTRAVENOUS at 07:06

## 2020-05-29 RX ADMIN — HEPARIN SODIUM 5000 UNITS: 5000 INJECTION INTRAVENOUS; SUBCUTANEOUS at 07:00

## 2020-05-29 RX ADMIN — Medication 10 ML: at 00:33

## 2020-05-29 RX ADMIN — SODIUM BICARBONATE: 84 INJECTION, SOLUTION INTRAVENOUS at 12:57

## 2020-05-29 ASSESSMENT — PAIN SCALES - GENERAL
PAINLEVEL_OUTOF10: 0

## 2020-05-29 NOTE — PROGRESS NOTES
4 Eyes Skin Assessment     The patient is being assess for  Admission    I agree that 2 RN's have performed a thorough Head to Toe Skin Assessment on the patient. ALL assessment sites listed below have been assessed. Areas assessed by both nurses:   [x]   Head, Face, and Ears   [x]   Shoulders, Back, and Chest  [x]   Arms, Elbows, and Hands   [x]   Coccyx, Sacrum, and IschIum  [x]   Legs, Feet, and Heels        Does the Patient have Skin Breakdown?   No         Frankie Prevention initiated:  Yes   Wound Care Orders initiated:  No      [facility] nurse consulted for Pressure Injury (Stage 3,4, Unstageable, DTI, NWPT, and Complex wounds), New and Established Ostomies:  No      Nurse 1 eSignature: Electronically signed by Judge Miguel Angel RN on 5/29/20 at 7:39 AM EDT    **SHARE this note so that the co-signing nurse is able to place an eSignature**    Nurse 2 eSignature: {Esignature:[id number]}

## 2020-05-30 ENCOUNTER — APPOINTMENT (OUTPATIENT)
Dept: GENERAL RADIOLOGY | Age: 85
DRG: 682 | End: 2020-05-30
Payer: MEDICARE

## 2020-05-30 LAB
ALBUMIN SERPL-MCNC: 2.8 G/DL (ref 3.4–5)
ALP BLD-CCNC: 92 U/L (ref 40–129)
ALT SERPL-CCNC: 714 U/L (ref 10–40)
ANION GAP SERPL CALCULATED.3IONS-SCNC: 21 MMOL/L (ref 3–16)
AST SERPL-CCNC: 278 U/L (ref 15–37)
BILIRUB SERPL-MCNC: 1.3 MG/DL (ref 0–1)
BILIRUBIN DIRECT: 0.9 MG/DL (ref 0–0.3)
BILIRUBIN, INDIRECT: 0.4 MG/DL (ref 0–1)
BUN BLDV-MCNC: 120 MG/DL (ref 7–20)
CALCIUM SERPL-MCNC: 5.7 MG/DL (ref 8.3–10.6)
CHLORIDE BLD-SCNC: 104 MMOL/L (ref 99–110)
CO2: 18 MMOL/L (ref 21–32)
CREAT SERPL-MCNC: 6.4 MG/DL (ref 0.8–1.3)
GFR AFRICAN AMERICAN: 10
GFR NON-AFRICAN AMERICAN: 8
GLUCOSE BLD-MCNC: 101 MG/DL (ref 70–99)
HCT VFR BLD CALC: 31.4 % (ref 40.5–52.5)
HEMOGLOBIN: 10.1 G/DL (ref 13.5–17.5)
MAGNESIUM: 2.5 MG/DL (ref 1.8–2.4)
MCH RBC QN AUTO: 31.9 PG (ref 26–34)
MCHC RBC AUTO-ENTMCNC: 32.2 G/DL (ref 31–36)
MCV RBC AUTO: 99.1 FL (ref 80–100)
PDW BLD-RTO: 24.9 % (ref 12.4–15.4)
PHOSPHORUS: 7.7 MG/DL (ref 2.5–4.9)
PLATELET # BLD: 42 K/UL (ref 135–450)
PMV BLD AUTO: 10.8 FL (ref 5–10.5)
POTASSIUM SERPL-SCNC: 4.3 MMOL/L (ref 3.5–5.1)
RBC # BLD: 3.17 M/UL (ref 4.2–5.9)
SODIUM BLD-SCNC: 143 MMOL/L (ref 136–145)
TOTAL PROTEIN: 4.6 G/DL (ref 6.4–8.2)
WBC # BLD: 12.1 K/UL (ref 4–11)

## 2020-05-30 PROCEDURE — 6360000002 HC RX W HCPCS: Performed by: INTERNAL MEDICINE

## 2020-05-30 PROCEDURE — 6370000000 HC RX 637 (ALT 250 FOR IP): Performed by: INTERNAL MEDICINE

## 2020-05-30 PROCEDURE — 2500000003 HC RX 250 WO HCPCS: Performed by: INTERNAL MEDICINE

## 2020-05-30 PROCEDURE — 6360000002 HC RX W HCPCS: Performed by: NURSE PRACTITIONER

## 2020-05-30 PROCEDURE — 85027 COMPLETE CBC AUTOMATED: CPT

## 2020-05-30 PROCEDURE — 2580000003 HC RX 258: Performed by: INTERNAL MEDICINE

## 2020-05-30 PROCEDURE — 1200000000 HC SEMI PRIVATE

## 2020-05-30 PROCEDURE — 83735 ASSAY OF MAGNESIUM: CPT

## 2020-05-30 PROCEDURE — 80076 HEPATIC FUNCTION PANEL: CPT

## 2020-05-30 PROCEDURE — 80069 RENAL FUNCTION PANEL: CPT

## 2020-05-30 PROCEDURE — P9047 ALBUMIN (HUMAN), 25%, 50ML: HCPCS | Performed by: INTERNAL MEDICINE

## 2020-05-30 PROCEDURE — 71045 X-RAY EXAM CHEST 1 VIEW: CPT

## 2020-05-30 RX ORDER — SODIUM CHLORIDE 9 MG/ML
INJECTION, SOLUTION INTRAVENOUS
Status: DISPENSED
Start: 2020-05-30 | End: 2020-05-30

## 2020-05-30 RX ORDER — FUROSEMIDE 10 MG/ML
80 INJECTION INTRAMUSCULAR; INTRAVENOUS ONCE
Status: COMPLETED | OUTPATIENT
Start: 2020-05-30 | End: 2020-05-30

## 2020-05-30 RX ORDER — FENTANYL CITRATE 50 UG/ML
25 INJECTION, SOLUTION INTRAMUSCULAR; INTRAVENOUS
Status: DISCONTINUED | OUTPATIENT
Start: 2020-05-30 | End: 2020-06-01 | Stop reason: HOSPADM

## 2020-05-30 RX ORDER — ALBUMIN (HUMAN) 12.5 G/50ML
25 SOLUTION INTRAVENOUS ONCE
Status: COMPLETED | OUTPATIENT
Start: 2020-05-30 | End: 2020-05-30

## 2020-05-30 RX ADMIN — MEMANTINE 5 MG: 5 TABLET ORAL at 20:24

## 2020-05-30 RX ADMIN — SODIUM BICARBONATE: 84 INJECTION, SOLUTION INTRAVENOUS at 14:02

## 2020-05-30 RX ADMIN — CALCIUM GLUCONATE 4 G: 98 INJECTION, SOLUTION INTRAVENOUS at 11:28

## 2020-05-30 RX ADMIN — NYSTATIN 500000 UNITS: 100000 SUSPENSION ORAL at 20:30

## 2020-05-30 RX ADMIN — LORAZEPAM 1 MG: 2 INJECTION INTRAMUSCULAR; INTRAVENOUS at 01:23

## 2020-05-30 RX ADMIN — DONEPEZIL HYDROCHLORIDE 10 MG: 5 TABLET, FILM COATED ORAL at 20:23

## 2020-05-30 RX ADMIN — FENTANYL CITRATE 25 MCG: 50 INJECTION, SOLUTION INTRAMUSCULAR; INTRAVENOUS at 21:39

## 2020-05-30 RX ADMIN — CEFTRIAXONE SODIUM 1 G: 1 INJECTION, POWDER, FOR SOLUTION INTRAMUSCULAR; INTRAVENOUS at 10:30

## 2020-05-30 RX ADMIN — LEVOTHYROXINE SODIUM 175 MCG: 150 TABLET ORAL at 06:40

## 2020-05-30 RX ADMIN — Medication 10 ML: at 10:31

## 2020-05-30 RX ADMIN — Medication 10 ML: at 20:31

## 2020-05-30 RX ADMIN — NYSTATIN 500000 UNITS: 100000 SUSPENSION ORAL at 17:16

## 2020-05-30 RX ADMIN — NYSTATIN 500000 UNITS: 100000 SUSPENSION ORAL at 13:58

## 2020-05-30 RX ADMIN — ALBUMIN (HUMAN) 25 G: 0.25 INJECTION, SOLUTION INTRAVENOUS at 13:58

## 2020-05-30 RX ADMIN — ACETAMINOPHEN 650 MG: 325 TABLET ORAL at 20:24

## 2020-05-30 RX ADMIN — FUROSEMIDE 80 MG: 10 INJECTION, SOLUTION INTRAMUSCULAR; INTRAVENOUS at 14:53

## 2020-05-30 ASSESSMENT — PAIN SCALES - GENERAL
PAINLEVEL_OUTOF10: 5
PAINLEVEL_OUTOF10: 10
PAINLEVEL_OUTOF10: 8
PAINLEVEL_OUTOF10: 9

## 2020-05-30 NOTE — DISCHARGE INSTR - COC
Continuity of Care Form    Patient Name: Scarlet List   :  1935  MRN:  [id number]    Admit date:  2020  Discharge date:  2020    Code Status Order: [facility]   Advance Directives:   [facility] Documentation     Date/Time Healthcare Directive Type of Healthcare Directive Copy in [address] Agent's Name Healthcare Agent's Phone Number    [phone]  No, patient does not have an advance directive for healthcare treatment -- -- -- -- --          Admitting Physician:  Iraj Tejeda MD  PCP: Ketan Herrera MD    Discharging Nurse: [facility] Unit/Room#: 5122/8486-93  Discharging Unit Phone Number: [phone]    Emergency Contact:   Extended Emergency Contact Information  Primary Emergency Contact: [address] [place] [address] Phone: [phone]  Relation: Child  Secondary Emergency Contact: Sheri Lopez  [place] Phone: [phone]  Relation: Other    Past Surgical History:  Past Surgical History:   Procedure Laterality Date    CORONARY ARTERY BYPASS GRAFT      KNEE SURGERY Left     knee replacement       Immunization History:   Immunization History   Administered Date(s) Administered    Influenza 2012, 10/29/2013    Influenza Virus Vaccine 2015    Influenza, High Dose (Fluzone 65 yrs and older) 10/25/2011, 2017, 2017, 2018    Pneumococcal Conjugate 13-valent ([id number]) 2016    Pneumococcal Conjugate 7-valent ([facility]) 10/27/2006       Active Problems:  Patient Active Problem List   Diagnosis Code    Essential hypertension, benign I10    Coronary artery disease involving native heart without angina pectoris I25.10    Pure hypercholesterolemia E78.00    PAD (peripheral artery disease) ([facility] [address] I73.9    Hypothyroid E03.9    Cognitive impairment R41.89    Constipation K59.00    BPH associated with nocturia N40.1, R35.1    Arthralgia of knee, right M25.561    Depressed F32.9    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) I21.4    Ischemic cardiomyopathy I25.5    Left bundle branch block I44.7    Nonrheumatic mitral valve regurgitation I34.0    Bradycardia R00.1    STEPHANIE (acute kidney injury) ([facility]) N17.9    ARF (acute renal failure) ([facility]) N17.9    HTN (hypertension) I10    Hyperlipidemia E78.5    Dementia ([facility]) F03.90    Mild malnutrition ([facility]) E44.1    Diarrhea R19.7    Hyperkalemia E87.5       Isolation/Infection:   Isolation          C Diff Contact        Patient Infection Status     Infection Onset Added Last Indicated Last Indicated By Review Planned Expiration Resolved Resolved By    C-diff Rule Out 05/29/20 05/29/20 05/29/20 Clostridium difficile toxin/antigen (Ordered)        Resolved    COVID-19 Rule Out 05/28/20 05/28/20 05/28/20 COVID-19 (Ordered)   05/29/20 Rule-Out Test Resulted    C-diff Rule Out 03/27/20 03/27/20 03/27/20 GI Bacterial Pathogens By PCR (Ordered)   03/28/20 Lesly Anton RN    C-diff Rule Out 03/26/20 03/26/20 03/26/20 Clostridium difficile toxin/antigen (Ordered)   03/27/20 Rule-Out Test Resulted          Nurse Assessment:  Last Vital Signs: BP (!) 141/77   Pulse 67   Temp 97.6 °F (36.4 °C) (Bladder)   Resp 18   Ht 5' 9\" (1.753 m)   Wt 135 lb 5.8 oz (61.4 kg)   SpO2 100%   BMI 19.99 kg/m²     Last documented pain score (0-10 scale): Pain Level: 0  Last Weight:   Wt Readings from Last 1 Encounters:   05/30/20 135 lb 5.8 oz (61.4 kg)     Mental Status:  disoriented and alert    IV Access:  - None    Nursing Mobility/ADLs:  Walking   Dependent  Transfer  Dependent  Bathing  Dependent  Dressing  Dependent  Toileting  Dependent  Feeding  Dependent  Med Admin  Dependent  Med Delivery   NPO d/t silent aspiration

## 2020-05-30 NOTE — PLAN OF CARE
Problem: Falls - Risk of:  Goal: Will remain free from falls  Description: Will remain free from falls  Outcome: Ongoing  Goal: Absence of physical injury  Description: Absence of physical injury  Outcome: Ongoing     Problem: OXYGENATION/RESPIRATORY FUNCTION  Goal: Patient will maintain patent airway  Outcome: Ongoing  Goal: Patient will achieve/maintain normal respiratory rate/effort  Description: Respiratory rate and effort will be within normal limits for the patient  Outcome: Ongoing     Problem: HEMODYNAMIC STATUS  Goal: Patient has stable vital signs and fluid balance  Outcome: Ongoing     Problem: FLUID AND ELECTROLYTE IMBALANCE  Goal: Fluid and electrolyte balance are achieved/maintained  Outcome: Ongoing     Patient's EF (Ejection Fraction) is 40-45% as of 11/29/19. Heart Failure Medications:  Diuretics[de-identified] None    (One of the following REQUIRED for EF <40%/SYSTOLIC FAILURE but MAY be used in EF% >40%/DIASTOLIC FAILURE)        ACE[de-identified] None        ARB[de-identified] None         ARNI[de-identified] None    (Beta Blockers)  NON- Evidenced Based Beta Blocker (for EF% >40%/DIASTOLIC FAILURE): None    Evidenced Based Beta Blocker::(REQUIRED for EF% <40%/SYSTOLIC FAILURE) None  . .................................................................................................................................................. Patient's weights and intake/output reviewed: Yes    Patient's Last Weight: 61.4 kg obtained by bed scale. Difference of 0.9 kg more than last documented weight.       Intake/Output Summary (Last 24 hours) at 5/30/2020 1550  Last data filed at 5/30/2020 1420  Gross per 24 hour   Intake 81123.21 ml   Output 855 ml   Net 14262.21 ml       Comorbidities Reviewed Yes    Patient has a past medical history of Acute kidney failure, unspecified ([address] Athscl heart disease of native coronary artery w/o ang pctrs, Benign prostatic hyperplasia with lower urinary tract symptoms, Bradycardia, unspecified, CAD (coronary artery

## 2020-05-30 NOTE — CARE COORDINATION
CASE MANAGEMENT INITIAL ASSESSMENT      Reviewed chart and completed assessment via telephone with: cristel Reddy [phone]  Explained Case Management role/services. Primary contact information: cristel Reddy [address] date/status: 5/28 IP  Diagnosis: Diarrhea, ARF  Is this a Readmission?: Yes Sent from facility with diarrhea  Insurance: [employer] primary, medicaid pending secondary  Precert required for SNF - Y, N        3 night stay required - Y, N    Living arrangements, Adls, care needs, prior to admission: Pt is LTC @ [facility]    Transportation: ambulance    [address] at home: in facility    Services in the home and/or outpatient, prior to admission: in facility    PT/OT recs: none available    [address] Notification (HEN): not initiated    Barriers to discharge: none    Plan/comments:  Per pt son, plan is to return to [facility] at d/c. Per miracle Barrientos @ [facility], pt is LTC and can return with a negative covid within 24 hrs of entry at facility. If pt is rec'd skilled will need cert.      ECOC on chart for MD signature

## 2020-05-30 NOTE — PROGRESS NOTES
1246:   [address] [facility] Dr. Lexis Dudley:  \"Patient's toes/bottom of right foot purple & mottled. Pulses normal +1 bilaterally. Cap refill greater than 3 seconds. B/L feet slightly cold. \"    2990:   Dr. Lexis Dudley returned call & said to monitor discoloration & circulation. If situation worsens, to call her back. She will look at it tomorrow during rounds and consult vascular if necessary.

## 2020-05-30 NOTE — PROGRESS NOTES
Patient assessment complete and charted. VSS. Patient opens his eyes to speech & quickly falls back asleep. Attempted to crush meds in applesauce, but patient was unable to follow commands d/t lethargy. Held PO meds. Occasionally moans & groans out. F/c remains in place. Bed locked and in lowest position. Non-skid socks in place. Call light within reach. Bed alarm on. Patient states no further needs at this time. Will continue to monitor.

## 2020-05-31 LAB
ALBUMIN SERPL-MCNC: 2.9 G/DL (ref 3.4–5)
ANION GAP SERPL CALCULATED.3IONS-SCNC: 20 MMOL/L (ref 3–16)
BUN BLDV-MCNC: 124 MG/DL (ref 7–20)
CALCIUM SERPL-MCNC: 6.2 MG/DL (ref 8.3–10.6)
CHLORIDE BLD-SCNC: 103 MMOL/L (ref 99–110)
CO2: 21 MMOL/L (ref 21–32)
CREAT SERPL-MCNC: 6.3 MG/DL (ref 0.8–1.3)
GFR AFRICAN AMERICAN: 10
GFR NON-AFRICAN AMERICAN: 9
GLUCOSE BLD-MCNC: 98 MG/DL (ref 70–99)
PHOSPHORUS: 7.4 MG/DL (ref 2.5–4.9)
POTASSIUM SERPL-SCNC: 4 MMOL/L (ref 3.5–5.1)
SODIUM BLD-SCNC: 144 MMOL/L (ref 136–145)
TOTAL CK: 8421 U/L (ref 39–308)

## 2020-05-31 PROCEDURE — 80069 RENAL FUNCTION PANEL: CPT

## 2020-05-31 PROCEDURE — 6360000002 HC RX W HCPCS: Performed by: NURSE PRACTITIONER

## 2020-05-31 PROCEDURE — 2580000003 HC RX 258: Performed by: INTERNAL MEDICINE

## 2020-05-31 PROCEDURE — 6370000000 HC RX 637 (ALT 250 FOR IP): Performed by: INTERNAL MEDICINE

## 2020-05-31 PROCEDURE — 99221 1ST HOSP IP/OBS SF/LOW 40: CPT | Performed by: SURGERY

## 2020-05-31 PROCEDURE — 1200000000 HC SEMI PRIVATE

## 2020-05-31 PROCEDURE — 82550 ASSAY OF CK (CPK): CPT

## 2020-05-31 PROCEDURE — 2500000003 HC RX 250 WO HCPCS: Performed by: INTERNAL MEDICINE

## 2020-05-31 PROCEDURE — 6360000002 HC RX W HCPCS: Performed by: INTERNAL MEDICINE

## 2020-05-31 RX ADMIN — SODIUM BICARBONATE: 84 INJECTION, SOLUTION INTRAVENOUS at 22:36

## 2020-05-31 RX ADMIN — ATORVASTATIN CALCIUM 20 MG: 10 TABLET, FILM COATED ORAL at 09:35

## 2020-05-31 RX ADMIN — MEMANTINE 5 MG: 5 TABLET ORAL at 22:23

## 2020-05-31 RX ADMIN — NYSTATIN 500000 UNITS: 100000 SUSPENSION ORAL at 09:36

## 2020-05-31 RX ADMIN — MEMANTINE 5 MG: 5 TABLET ORAL at 09:36

## 2020-05-31 RX ADMIN — ASPIRIN 81 MG: 81 TABLET ORAL at 09:36

## 2020-05-31 RX ADMIN — FENTANYL CITRATE 25 MCG: 50 INJECTION, SOLUTION INTRAMUSCULAR; INTRAVENOUS at 14:52

## 2020-05-31 RX ADMIN — CEFTRIAXONE SODIUM 1 G: 1 INJECTION, POWDER, FOR SOLUTION INTRAMUSCULAR; INTRAVENOUS at 09:36

## 2020-05-31 RX ADMIN — LEVOTHYROXINE SODIUM 175 MCG: 150 TABLET ORAL at 06:24

## 2020-05-31 RX ADMIN — Medication 10 ML: at 22:24

## 2020-05-31 RX ADMIN — FLUOXETINE 20 MG: 20 CAPSULE ORAL at 09:36

## 2020-05-31 RX ADMIN — FENTANYL CITRATE 25 MCG: 50 INJECTION, SOLUTION INTRAMUSCULAR; INTRAVENOUS at 22:31

## 2020-05-31 RX ADMIN — DONEPEZIL HYDROCHLORIDE 10 MG: 5 TABLET, FILM COATED ORAL at 22:24

## 2020-05-31 RX ADMIN — FENTANYL CITRATE 25 MCG: 50 INJECTION, SOLUTION INTRAMUSCULAR; INTRAVENOUS at 02:39

## 2020-05-31 RX ADMIN — NYSTATIN 500000 UNITS: 100000 SUSPENSION ORAL at 22:24

## 2020-05-31 RX ADMIN — SODIUM BICARBONATE: 84 INJECTION, SOLUTION INTRAVENOUS at 23:12

## 2020-05-31 ASSESSMENT — PAIN SCALES - PAIN ASSESSMENT IN ADVANCED DEMENTIA (PAINAD)
BREATHING: 1
TOTALSCORE: 4
NEGVOCALIZATION: 1
FACIALEXPRESSION: 1
CONSOLABILITY: 1
BODYLANGUAGE: 0

## 2020-05-31 ASSESSMENT — PAIN SCALES - GENERAL
PAINLEVEL_OUTOF10: 9
PAINLEVEL_OUTOF10: 0
PAINLEVEL_OUTOF10: 7
PAINLEVEL_OUTOF10: 4
PAINLEVEL_OUTOF10: 7
PAINLEVEL_OUTOF10: 4

## 2020-05-31 NOTE — PROGRESS NOTES
Pt removed 2 IVs and his tele box around 1500. Pt placed on [employer] camera due to pulling at lines and tele. Managed to get 20 in [facility] but it blew once medication was given. Called ICU to see if RN could come up with ultrasound but as of now no one is available. Will continue to attempt to get new access.

## 2020-05-31 NOTE — PROGRESS NOTES
Transferred from 234. Report received from C2 RN. 4 assessment completed at this time.  Pt was transferred with all belongings to 65

## 2020-05-31 NOTE — PROGRESS NOTES
Moving all extremities psychiatric: Alert and oriented x0, no insight  Peripheral Pulses: discoloration and mottling of left toes with distal pedal pulse palpable, left foot warm. Rt foot cold with distal pulse not palpable, minimal discoloration of toes     Labs:   Recent Labs     05/28/20  1220 05/29/20  0418 05/30/20  0505   WBC 11.4* 10.6 12.1*   HGB 11.7* 10.3* 10.1*   HCT 37.3* 31.7* 31.4*   PLT 51* 38* 42*     Recent Labs     05/29/20  1640 05/30/20  0505 05/31/20  0500    143 144   K 4.5 4.3 4.0    104 103   CO2 19* 18* 21   * 120* 124*   CREATININE 6.5* 6.4* 6.3*   CALCIUM 6.0* 5.7* 6.2*   PHOS 8.1* 7.7* 7.4*     Recent Labs     05/28/20  1220 05/28/20  1620 05/30/20  0505   * 577* 278*   ALT 1,248* 945* 714*   BILIDIR  --   --  0.9*   BILITOT 1.6* 1.2* 1.3*   ALKPHOS 116 85 92     No results for input(s): INR in the last 72 hours. Recent Labs     05/28/20  1220   TROPONINI 0.31*       Urinalysis:      Lab Results   Component Value Date    NITRU Negative 05/28/2020    WBCUA 10-20 05/28/2020    BACTERIA Rare 05/28/2020    RBCUA 5-10 05/28/2020    BLOODU LARGE 05/28/2020    SPECGRAV 1.025 05/28/2020    GLUCOSEU Negative 05/28/2020    GLUCOSEU Negative 12/19/2011       Consults:    IP CONSULT TO HOSPITALIST  IP CONSULT TO NEPHROLOGY  IP CONSULT TO PALLIATIVE CARE  IP CONSULT TO PALLIATIVE CARE      Assessment/Plan:    Active Hospital Problems    Diagnosis    Diarrhea [R19.7]    Hyperkalemia [E87.5]    HTN (hypertension) [I10]    Hyperlipidemia [E78.5]    ARF (acute renal failure) ([facility] [address] [N17.9]    Hypothyroid [E03.9]     ARF - likely due to hypovolemia. Nephro assisting, on IVF. Cr slowly downtrending, 6.3 today. Monitor and avoid nephrotoxins    Diarrhea - of unclear etiology. Seems to have resolved as has not had any BM since admission. C. diff ordered but unable to obtain stool sample as no BM so far per RN.            HyperKalemia - likely 2nd to above - tx in ED. Follow serial labs - resolved. Reviewed and documented as above.     HTN -controlled. Continue home meds.       HyperLipidemia -continue statin. HypoThyroid - clinically euthyroid on oral replacement therapy. Continue, w/ outpt monitoring as previously arranged.      Transaminitis - Likely volume depletion. Will continue to follow serial labs - improving.             Dementia - w/out behavioral disturbance. Controlled on home medication regimen - continued, w/ Namenda dose renally adjusted. Continue supportive care and redirection as needed.      Oral thrush : nystatin swish and swallow ordered. Dysphagia: swallow eval pending      Thrombocytopenia: Unclear etiology with no overt signs of bleeding. Avoiding chemical DVT prophylaxis. Slowly improving    Mottling of feet: rt pedal pulse not palpable. Vascular surgery to eval.         DVT Prophylaxis:  SCDs  Diet: DIET GENERAL;  Code Status: DNR-CC. Palliative care consulted to assist with goals of care       PT/OT Eval Status: not yet ordered.      Dispo - hard to say,  pending clinical course.  SARS-CoV-2, PCR was negative    Elida Almeida MD

## 2020-06-01 VITALS
RESPIRATION RATE: 14 BRPM | OXYGEN SATURATION: 95 % | TEMPERATURE: 97.2 F | DIASTOLIC BLOOD PRESSURE: 78 MMHG | HEART RATE: 84 BPM | WEIGHT: 135.36 LBS | SYSTOLIC BLOOD PRESSURE: 122 MMHG | HEIGHT: 69 IN | BODY MASS INDEX: 20.05 KG/M2

## 2020-06-01 LAB
BLOOD CULTURE, ROUTINE: NORMAL
CULTURE, BLOOD 2: NORMAL

## 2020-06-01 PROCEDURE — 2580000003 HC RX 258: Performed by: INTERNAL MEDICINE

## 2020-06-01 PROCEDURE — 6360000002 HC RX W HCPCS: Performed by: NURSE PRACTITIONER

## 2020-06-01 PROCEDURE — 6370000000 HC RX 637 (ALT 250 FOR IP): Performed by: INTERNAL MEDICINE

## 2020-06-01 PROCEDURE — 2500000003 HC RX 250 WO HCPCS: Performed by: INTERNAL MEDICINE

## 2020-06-01 PROCEDURE — 92610 EVALUATE SWALLOWING FUNCTION: CPT

## 2020-06-01 PROCEDURE — 6360000002 HC RX W HCPCS: Performed by: INTERNAL MEDICINE

## 2020-06-01 PROCEDURE — 6370000000 HC RX 637 (ALT 250 FOR IP): Performed by: NURSE PRACTITIONER

## 2020-06-01 RX ORDER — NICOTINE 21 MG/24HR
1 PATCH, TRANSDERMAL 24 HOURS TRANSDERMAL DAILY
Status: DISCONTINUED | OUTPATIENT
Start: 2020-06-01 | End: 2020-06-01 | Stop reason: HOSPADM

## 2020-06-01 RX ADMIN — FENTANYL CITRATE 25 MCG: 50 INJECTION, SOLUTION INTRAMUSCULAR; INTRAVENOUS at 01:57

## 2020-06-01 RX ADMIN — NYSTATIN 500000 UNITS: 100000 SUSPENSION ORAL at 12:27

## 2020-06-01 RX ADMIN — CEFTRIAXONE SODIUM 1 G: 1 INJECTION, POWDER, FOR SOLUTION INTRAMUSCULAR; INTRAVENOUS at 09:36

## 2020-06-01 RX ADMIN — MEMANTINE 5 MG: 5 TABLET ORAL at 09:35

## 2020-06-01 RX ADMIN — SODIUM BICARBONATE: 84 INJECTION, SOLUTION INTRAVENOUS at 13:02

## 2020-06-01 RX ADMIN — LORAZEPAM 1 MG: 2 INJECTION INTRAMUSCULAR; INTRAVENOUS at 03:13

## 2020-06-01 RX ADMIN — ATORVASTATIN CALCIUM 20 MG: 10 TABLET, FILM COATED ORAL at 09:35

## 2020-06-01 RX ADMIN — LEVOTHYROXINE SODIUM 175 MCG: 150 TABLET ORAL at 06:35

## 2020-06-01 RX ADMIN — ASPIRIN 81 MG: 81 TABLET ORAL at 09:35

## 2020-06-01 RX ADMIN — FENTANYL CITRATE 25 MCG: 50 INJECTION, SOLUTION INTRAMUSCULAR; INTRAVENOUS at 17:50

## 2020-06-01 RX ADMIN — LORAZEPAM 1 MG: 2 INJECTION INTRAMUSCULAR; INTRAVENOUS at 15:17

## 2020-06-01 RX ADMIN — FLUOXETINE 20 MG: 20 CAPSULE ORAL at 09:35

## 2020-06-01 RX ADMIN — NYSTATIN 500000 UNITS: 100000 SUSPENSION ORAL at 09:39

## 2020-06-01 RX ADMIN — FENTANYL CITRATE 25 MCG: 50 INJECTION, SOLUTION INTRAMUSCULAR; INTRAVENOUS at 13:28

## 2020-06-01 ASSESSMENT — PAIN SCALES - PAIN ASSESSMENT IN ADVANCED DEMENTIA (PAINAD)
BREATHING: 0
FACIALEXPRESSION: 0
BREATHING: 0
NEGVOCALIZATION: 0
FACIALEXPRESSION: 0
BODYLANGUAGE: 0
TOTALSCORE: 7
BODYLANGUAGE: 1
FACIALEXPRESSION: 0
BODYLANGUAGE: 0
CONSOLABILITY: 0
TOTALSCORE: 7
CONSOLABILITY: 2
NEGVOCALIZATION: 2
BREATHING: 0
CONSOLABILITY: 0
NEGVOCALIZATION: 2
NEGVOCALIZATION: 0
BREATHING: 0
FACIALEXPRESSION: 0
FACIALEXPRESSION: 2
FACIALEXPRESSION: 0
TOTALSCORE: 0
BODYLANGUAGE: 0
NEGVOCALIZATION: 0
TOTALSCORE: 0
BREATHING: 0
TOTALSCORE: 0
CONSOLABILITY: 0
FACIALEXPRESSION: 2
CONSOLABILITY: 0
BODYLANGUAGE: 0
BREATHING: 0
TOTALSCORE: 0
BREATHING: 0
NEGVOCALIZATION: 0
BODYLANGUAGE: 0
CONSOLABILITY: 0
CONSOLABILITY: 2
TOTALSCORE: 0
BODYLANGUAGE: 1
NEGVOCALIZATION: 0

## 2020-06-01 ASSESSMENT — PAIN SCALES - GENERAL
PAINLEVEL_OUTOF10: 7
PAINLEVEL_OUTOF10: 7

## 2020-06-01 NOTE — PROGRESS NOTES
Hospitalist Progress Note      PCP: Blue Kaba MD    Date of Admission: 5/28/2020    Chief Complaint: diarrhea/abnormal labs from SNF    Subjective: no new c/o. Medications:  Reviewed    Infusion Medications    sodium bicarbonate infusion 100 mL/hr at 05/31/20 2312     Scheduled Medications    nicotine  1 patch Transdermal Daily    nystatin  5 mL Oral 4x Daily    memantine  5 mg Oral BID    cefTRIAXone (ROCEPHIN) IV  1 g Intravenous Daily    sodium chloride flush  10 mL Intravenous 2 times per day    aspirin EC  81 mg Oral Daily    donepezil  10 mg Oral Nightly    FLUoxetine  20 mg Oral Daily    atorvastatin  20 mg Oral Daily    levothyroxine  175 mcg Oral Daily     PRN Meds: fentanNYL, sodium chloride flush, acetaminophen **OR** acetaminophen, polyethylene glycol, promethazine **OR** ondansetron, LORazepam      Intake/Output Summary (Last 24 hours) at 6/1/2020 0813  Last data filed at 6/1/2020 0415  Gross per 24 hour   Intake --   Output 2085 ml   Net -2085 ml       Physical Exam Performed:    BP (!) 143/87   Pulse 78   Temp 97.4 °F (36.3 °C) (Axillary)   Resp 12   Ht 5' 9\" (1.753 m)   Wt 135 lb 5.8 oz (61.4 kg)   SpO2 97%   BMI 19.99 kg/m²     General appearance: No apparent distress, appears stated age and cooperative. HEENT: Pupils equal, round, and reactive to light. Conjunctivae/corneas clear. Neck: Supple, with full range of motion. No jugular venous distention. Trachea midline. Respiratory:  Normal respiratory effort. Clear to auscultation, bilaterally without Rales/Wheezes/Rhonchi. Cardiovascular: Regular rate and rhythm with normal S1/S2 without murmurs, rubs or gallops. Abdomen: Soft, non-tender, non-distended with normal bowel sounds. Musculoskeletal: No clubbing, cyanosis or edema bilaterally. Full range of motion without deformity. Skin: Skin color, texture, turgor normal.  No rashes or lesions.   Neurologic:  Neurovascularly intact without any focal

## 2020-06-01 NOTE — PROGRESS NOTES
Pharyngeal Phase Dysfunction   Pharyngeal Phase  Pharyngeal Phase: Exceptions  Indicators of Pharyngeal Phase Dysfunction  Delayed Swallow: All  Decreased Laryngeal Elevation: All  Absent Swallow: All(Pt required cues (verbal and tactile) to swallow)  Change in Vital Signs: Puree  Pharyngeal Phase   Pharyngeal: Suspected pharyngeal phase dysphagia characterized by change in vital signs following puree, absent swallow and decreased laryngeal elevation. Pt required tactile and verbal cues to initiate swallow. Suspected silent aspiration for all consistencies trialed this date. Prognosis  Prognosis  Prognosis for safe diet advancement: guarded  Barriers to reach goals: age;inconsistent alertness;severity of dysphagia  Individuals consulted  Consulted and agree with results and recommendations: Patient;RN    Education  Patient Education: Pt and RN educated on BSE findings and rationale.   Patient Education Response: Verbalizes understanding;Demonstrated understanding;Needs reinforcement  Safety Devices in place: Yes  Type of devices: Left in bed;Bed alarm in place;Call light within reach;Nurse notified       Therapy Time  SLP Individual Minutes  Time In: 3758  Time Out: [address]  Minutes: [address]  Speech Language Pathologist

## 2020-06-01 NOTE — CONSULTS
Inpatient consult to Palliative Care  Consult performed by: Deo Basurto RN  Consult ordered by: Navya Whitaker MD  Reason for consult: [address]            Palliative Care Initial Note  Palliative Care Admit date:  6/1/2020    Advance Directives: The chart designates son, Matteo La, as the primary decision maker though we do not have any AD copies in this EMR. Spoke w/ pts dtr, Georgie Coello, who does not believe pt executed AD's but will review the general POA they do have to verify. Pt has a DNR-CC code status. Plan of care/goals:  Jose Nearing the goal of she and Thien Mcconnell that pt \"just be kept comfortable, he is so ready to die and we want him to be comfortable until that time. \"  We reviewed pts co-morbidities and their desire is to return him to [facility] and involve the support of hospice. Family wish to forego subsequent hospitalizations. Family made aware of hospice provider options. Called ref to [facility] who will meet w/ family, hopefully today.      Social/Spiritual:  Pt was placed, according to Florentino Jason, in [facility] ~2 wks ago, prior to that he lived in the [address] [place] [address] to contact family to arrange hospice care        Reason for consult:    ___ Advance Care Planning  _X_ Transition of Care Planning  ___ Psychosocial/Spiritual Support  ___ Symptom Management                                                                                                                                        Deo Basurto RN
Thrombocytopenia, unspecified ([facility] [address]     Tobacco use     Unspecified dementia without behavioral disturbance ([facility] [address]        Past Surgical History:        Procedure Laterality Date    CORONARY ARTERY BYPASS GRAFT      KNEE SURGERY Left     knee replacement       Home Medications:    No current facility-administered medications on file prior to encounter. Current Outpatient Medications on File Prior to Encounter   Medication Sig Dispense Refill    acetaminophen (TYLENOL) 325 MG tablet Take 650 mg by mouth every 6 hours as needed for Pain      metoprolol succinate (TOPROL XL) 25 MG extended release tablet Take 0.5 tablets by mouth nightly 30 tablet 3    memantine (NAMENDA) 10 MG tablet Take 1 tablet by mouth 2 times daily 60 tablet 3    FLUoxetine (PROZAC) 20 MG capsule Take 1 capsule by mouth daily 30 capsule 3    ferrous sulfate (FE TABS) 325 (65 Fe) MG EC tablet Take 1 tablet by mouth daily (with breakfast) 30 tablet 3    levothyroxine (SYNTHROID) 175 MCG tablet TAKE 1 TABLET BY MOUTH DAILY 90 tablet 3    simvastatin (ZOCOR) 40 MG tablet TAKE 1 TABLET BY MOUTH EVERY DAY 90 tablet 3    donepezil (ARICEPT) 10 MG tablet TAKE 1 TABLET BY MOUTH EVERY NIGHT 90 tablet 3    fluticasone (FLONASE) 50 MCG/ACT nasal spray SHAKE LIQUID AND USE 1 SPRAY IN EACH NOSTRIL DAILY 1 Bottle 2    senna-docusate (PERICOLACE) 8.6-50 MG per tablet TAKE 2 TABLETS BY MOUTH DAILY AS NEEDED FOR CONSTIPATION 60 tablet 0    loratadine (CLARITIN) 10 MG tablet Take 1 tablet by mouth daily 30 tablet 5    aspirin EC 81 MG EC tablet Take 1 tablet by mouth daily 30 tablet 3       Allergies:  Patient has no known allergies.     Social History:    Social History     Socioeconomic History    Marital status:      Spouse name: Not on file    Number of children: Not on file    Years of education: Not on file    Highest education level: Not on file   Occupational History    Not on file   Social Needs    Financial resource strain:
hypertension, benign, Hypothyroid, Hypothyroidism, unspecified, Ischemic cardiomyopathy, Left bundle-branch block, unspecified, Major depressive disorder, single episode, Mild cognitive impairment, so stated, Mild protein-calorie malnutrition ([facility] [address] Muscle weakness, Nocturia, Nonrheumatic mitral (valve) insufficiency, PAD (peripheral artery disease), Pure hypercholesterolemia, Thrombocytopenia, unspecified ([address] Tobacco use, and Unspecified dementia without behavioral disturbance ([facility] [address] Past Surgical History  Reviewed has a past surgical history that includes knee surgery (Left) and Coronary artery bypass graft. Medications  Prior to Admission medications    Medication Sig Start Date End Date Taking?  Authorizing Provider   acetaminophen (TYLENOL) 325 MG tablet Take 650 mg by mouth every 6 hours as needed for Pain   Yes Historical Provider, MD   metoprolol succinate (TOPROL XL) 25 MG extended release tablet Take 0.5 tablets by mouth nightly 3/31/20  Yes TREVOR Mcintosh - CNP   memantine (NAMENDA) 10 MG tablet Take 1 tablet by mouth 2 times daily 3/9/20  Yes Du Thornton MD   FLUoxetine (PROZAC) 20 MG capsule Take 1 capsule by mouth daily 3/9/20  Yes Du Thornton MD   ferrous sulfate (FE TABS) 325 (65 Fe) MG EC tablet Take 1 tablet by mouth daily (with breakfast) 12/30/19  Yes Du Thornton MD   levothyroxine (SYNTHROID) 175 MCG tablet TAKE 1 TABLET BY MOUTH DAILY 12/24/19  Yes Du Thornton MD   simvastatin (ZOCOR) 40 MG tablet TAKE 1 TABLET BY MOUTH EVERY DAY 11/11/19  Yes Du Thornton MD   donepezil (ARICEPT) 10 MG tablet TAKE 1 TABLET BY MOUTH EVERY NIGHT 8/23/19  Yes Du Thornton MD   fluticasone (FLONASE) 50 MCG/ACT nasal spray SHAKE LIQUID AND USE 1 SPRAY IN EACH NOSTRIL DAILY 11/15/17  Yes TREVOR Hutton - CNP   senna-docusate (PERICOLACE) 8.6-50 MG per tablet TAKE 2 TABLETS BY MOUTH DAILY AS NEEDED FOR CONSTIPATION 6/12/17  Yes Du Thornton MD   loratadine (CLARITIN) 10 MG tablet

## 2020-06-01 NOTE — ADT AUTH CERT
Continuous Infusions:sodium bicarbonate infusion @125 to 75      PRN Meds:.sodium chloride flush, acetaminophen **OR** acetaminophen, polyethylene glycol, promethazine **OR** ondansetron, LORazepam IV x1      IM:   Assessment/Plan:       Active Hospital Problems     Diagnosis   · Diarrhea [R19.7]   · Hyperkalemia [E87.5]   · HTN (hypertension) [I10]   · Hyperlipidemia [E78.5]   · ARF (acute renal failure) (HCC) [N17.9]   · Hypothyroid [E03.9]               Diarrhea - of unclear etiology.  Supportive care.  C diff ordered and pending.        ARF - w/ elevated BUN/Cr ratio c/w pre-renal azotemia. Given IVF hydration and follow serial labs - improving.  Reviewed and documented as above.  Nephrology consulted and appreciated.       HyperKalemia - likely 2nd to above - tx in ED.  Follow serial labs - resolved.  Reviewed and documented as above.       HTN - w/out known CAD and no evidence of active signs/sxs of ischemia/failure. Currently controlled on home meds w/ vitals reviewed and documented as above.       HyperLipidemia - controlled on home Statin. Continue, w/ f/u and med adjustment w/ PCP       HypoThyroid - clinically euthyroid on oral replacement therapy. Continue, w/ outpt monitoring as previously arranged.        Transaminitis - Likely volume depletion.  Will continue to follow serial labs - improving.  Reviewed and documented as above.       Anemia - etiology clinically unable to determine, w/out evidence of active bleeding/hemolysis. Stable and asymptomatic w/out indication for transfusion.  Follow serial labs.  Reviewed and documented as above.       Dementia - w/out behavioral disturbance.  Controlled on home medication regimen - continued, w/ Namenda dose renally adjusted.  Continue supportive care and redirection as needed.            DVT Prophylaxis: SQ Heparin held w/ thrombocytopenia - IPC placed   Diet: DIET GENERAL;   Code Status: Full Code       PT/OT Eval Status: not yet ordered.        Dispo Debra Florence here over the weekend. SARS-CoV-2, PCR negative         Nephro:   Assessment   -STEPHANIE on ckd due to volume depletion from diarrhea, seems to be responding with IVF   -hyperkalemia with bradycardia on presentation which has improved with iv insulin, ca, bicarb   -Metabolic acidosis from STEPHANIE and diarrhea   -CKD w baseline cr of ~2   -Acute metabolic encephalopathy   -Dementia   -elevated liver enzymes   -hypocalcemia       Plan   -s/p 2.7 l NS bolus on 5/28 n 1l NS on 5/29   -Maintain hemodynamics: Keep MAP>65     -IVF 1/2 NS +75 meq sod bicarb @125 ml/h   -iv calcium as ordered    -RRT- poor dialysis candiate   -Serial renal panel   -Daily wts and strict i/o   -Renal dose meds and avoid nephrotoxins

## 2020-06-01 NOTE — PLAN OF CARE
Problem: Falls - Risk of:  Goal: Will remain free from falls  Outcome: Ongoing     Problem: FLUID AND ELECTROLYTE IMBALANCE  Goal: Fluid and electrolyte balance are achieved/maintained  Outcome: Ongoing     Problem: Discharge Planning:  Goal: Discharged to appropriate level of care  Outcome: Ongoing     Problem: Discharge Planning:  Goal: Discharged to appropriate level of care  Outcome: Ongoing     Problem: Injury - Risk of, Physical Injury:  Goal: Will remain free from falls  Outcome: Ongoing

## 2020-06-01 NOTE — PROGRESS NOTES
[facility]    Follow up Note           Reason for Consult:  Jj on ckd , hyperkalemia  Requesting Physician:  Dr. Mian Herron history  Resting  BP stable      ROS: confused  PSFH: No visitor    Scheduled Meds:   nicotine  1 patch Transdermal Daily    nystatin  5 mL Oral 4x Daily    memantine  5 mg Oral BID    cefTRIAXone (ROCEPHIN) IV  1 g Intravenous Daily    sodium chloride flush  10 mL Intravenous 2 times per day    aspirin EC  81 mg Oral Daily    donepezil  10 mg Oral Nightly    FLUoxetine  20 mg Oral Daily    atorvastatin  20 mg Oral Daily    levothyroxine  175 mcg Oral Daily     Continuous Infusions:   sodium bicarbonate infusion 100 mL/hr at 05/31/20 2312     PRN Meds:.fentanNYL, sodium chloride flush, acetaminophen **OR** acetaminophen, polyethylene glycol, promethazine **OR** ondansetron, LORazepam  History of Present Illness on 5/28/20:    82 y.o. 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   He was in the NH and has been t/f d/t diarrhea for the last 2 days and elevated BUN n cr  Pt received 2.7 l NS bolus in the Ed and iv bicarb, iv insulin, ca for his hyperkalemia    Physical exam:   Constitutional:  VITALS:  /85   Pulse 73   Temp 97.4 °F (36.3 °C) (Axillary)   Resp 12   Ht 5' 9\" (1.753 m)   Wt 135 lb 5.8 oz (61.4 kg)   SpO2 94%   BMI 19.99 kg/m²   Gen: NAD  Skin: no rash, turgor dry  Heent:  eomi, mmm  Neck: no bruits or jvd noted, thyroid normal  Cardiovascular:  S1, S2 without m/r/g  Respiratory: CTA B without w/r/r; respiratory effort normal  Abdomen:  +bs, soft, nt, nd, no hepatosplenomegaly  Ext:b/l mottling of b/l LE  Neuro/Psy: arouseable ; moves all 4 ext      Data/  Recent Labs     05/30/20  0505   WBC 12.1*

## 2020-06-02 NOTE — ADT AUTH CERT
-palliative care recommended      Internal Medicine-   ARF - likely due to hypovolemia. Nephro assisting, on IVF. Cr slowly downtrending, 6.3 today.   Monitor and avoid nephrotoxins       Diarrhea - of unclear etiology.  Seems to have resolved as has not had any BM since admission.  C. diff ordered but unable to obtain stool sample as no BM so far per RN.            HyperKalemia - likely 2nd to above - tx in ED.  Follow serial labs - resolved.  Reviewed and documented as above.       HTN -controlled.  Continue home meds.         HyperLipidemia -continue statin.       HypoThyroid - clinically euthyroid on oral replacement therapy. Continue, w/ outpt monitoring as previously arranged.        Transaminitis - Likely volume depletion.  Will continue to follow serial labs - improving.         Dementia - w/out behavioral disturbance.  Controlled on home medication regimen - continued, w/ Namenda dose renally adjusted.  Continue supportive care and redirection as needed.        Oral thrush : nystatin swish and swallow ordered.        Dysphagia: swallow eval pending         Thrombocytopenia: Unclear etiology with no overt signs of bleeding.  Avoiding chemical DVT prophylaxis.  Slowly improving       Mottling of feet: rt pedal pulse not palpable.  Vascular surgery to eval.        DVT Prophylaxis:  SCDs   Diet: DIET GENERAL;   Code Status: DNR-CC. Palliative care consulted to assist with goals of care       Vascular Surgery-   IMPRESSIONS:   1. Bilateral ischemia left worse than right   2. DNR CC   3. Not a candidate for revascularization   4. Looks to be heading for a left leg amputation but I would think hospice would be a better option   5. does not have any pertinent problems on file. PLANS:   1. Dr. Elda Macario will see tomorrow   2.  If family does not agree for palliative care and consider heparin          Renal Failure, Acute - Care Day 3 (5/30/2020) by Jaylin Underwood RN         Review Status Review Entered   Completed

## 2020-11-03 PROBLEM — N17.9 AKI (ACUTE KIDNEY INJURY) (HCC): Status: RESOLVED | Noted: 2020-05-06 | Resolved: 2020-11-03

## 2021-01-01 NOTE — PROGRESS NOTES
Or    acetaminophen (TYLENOL) suppository 650 mg  650 mg Rectal Q6H PRN [place] MD        promethazine (PHENERGAN) tablet 12.5 mg  12.5 mg Oral Q6H PRN [place] MD        heparin (porcine) injection 5,000 Units  5,000 Units Subcutaneous 3 times per day [place] MD   Stopped at 03/29/20 2200    levothyroxine (SYNTHROID) tablet 175 mcg  175 mcg Oral Daily [place] MD   175 mcg at 03/30/20 0507       Objective:     Telemetry monitor: sinus arturo    Physical Exam:  Constitutional and general appearance: alert, cooperative, no distress, appears stated age and pale  HEENT: PERRL, no cervical lymphadenopathy. No masses palpable. Normal oral mucosa  Respiratory:  · Normal excursion and expansion without use of accessory muscles  · Resp auscultation: Normal breath sounds without wheezing, rhonchi, and rales  Cardiovascular:  · The apical impulse is not displaced  · Gr 3/6 systolic murmur. regular S1 and S2.  · Jugular venous pulsation Normal  · The carotid upstroke is normal in amplitude and contour without delay or bruit  · Peripheral pulses are symmetrical and full   Abdomen:  · No masses or tenderness  · Bowel sounds present  Extremities:  ·  No cyanosis or clubbing  ·  No lower extremity edema  ·  Skin: warm and dry  Neurological:  · Alert and oriented  · Moves all extremities well  · No abnormalities of mood, affect, memory, mentation, or behavior are noted    Data  EKG 3/26/2020:   Sinus arturo with 1st degree block and LBBB, HR 53    Echo 11/29/2019: The left ventricular systolic function is moderately reduced with an ejection fraction of 40-45 %. The left ventricle is mildly dilated. Mild concentric left ventricular hypertrophy. There is hypokinesis of the basal inferior and basal inferoseptal, apex and all apical wall segments. Left ventricular diastolic filling pressure are indeterminate. The left atrium is moderately dilated.   Moderate posterior mitral annular Lipid Panel:    Lab Results   Component Value Date    CHOL 139 01/29/2020    HDL 47 01/29/2020    TRIG 100 01/29/2020     Cardiac Enzymes:  CK/MbTroponin  Lab Results   Component Value Date    CKTOTAL 288 04/10/2012    TROPONINI 0.08 03/26/2020     PT/ INR   Lab Results   Component Value Date    INR 1.04 01/19/2020    INR 1.08 11/27/2019    PROTIME 12.1 01/19/2020    PROTIME 12.5 11/27/2019     PTT No results found for: PTT   Lab Results   Component Value Date    MG 3.00 03/26/2020      Lab Results   Component Value Date    TSH 1.30 01/29/2020       Assessment:  Sinus bradycardia: stable   -asymptomatic  Chronic systolic CHF: compensated   Ischemic cardiomyopathy: ongoing              -EF 40-45% on echo 11/2019 (unchaged from 1/2019)  LBBB: chronic, stable  HTN: controlled overall  CAD: s/p remote CABG  Mitral valve disease: severe MR and mild MS noted on echo 11/2019  Aortic stenosis: mild to moderate on echo 11/2019  Pulmonary HTN: moderate  Acute on chronic renal failure: nephrology following  Solitary right kidney  Tobacco abuse: former   Gastroenteritis: ongoing   Dementia    Plan:   1. Continue ASA, statin, and Toprol (hold parameters given)  2. No ACE/ARB due to CKD  3. Will defer diuretics to nephrology   4. Patient is not a candidate for CRT device for multiple reasons per Dr. Burgos Courts    EP will sign off but remains available if needed.      Violeta Ibarra, APRN-CNP  [employer]  [phone] N/A

## 2023-01-11 NOTE — H&P
----- Message from Kaiser Valente sent at 1/11/2023  1:01 PM EST -----  Regarding: Omeprazole 20 mg needed   Contact: [phone]  I need a new prescription  
Rx Refill Note  Requested Prescriptions     Pending Prescriptions Disp Refills   • omeprazole (priLOSEC) 20 MG capsule 90 capsule 1     Sig: Take 1 capsule by mouth Daily.      Last office visit with prescribing clinician: 1/31/2022   Last telemedicine visit with prescribing clinician: Visit date not found   Next office visit with prescribing clinician: Visit date not found                         Would you like a call back once the refill request has been completed: [] Yes [] No    If the office needs to give you a call back, can they leave a voicemail: [] Yes [] No    Portia Buchanan  01/11/23, 15:35 EST  
Relation Age of Onset    Hypertension Mother     Hypertension Father        REVIEW OF SYSTEMS:   Pertinent positives as noted in the HPI. All other systems reviewed and negative. PHYSICAL EXAM:    /77   Pulse 53   Resp 16   Ht 5' 9\" (1.753 m)   Wt 130 lb (59 kg)   SpO2 100%   BMI 19.20 kg/m²     General appearance:  No apparent distress, appears stated age. HEENT:  Normal cephalic, atraumatic without obvious deformity. Pupils equal, round, and reactive to light. Extra ocular muscles intact. Conjunctivae/corneas clear. Neck: Supple, with full range of motion. No jugular venous distention. Trachea midline. Respiratory:  Normal respiratory effort. Clear to auscultation, bilaterally without Rales/Wheezes/Rhonchi. Cardiovascular:  Regular rate and rhythm with normal S1/S2 without murmurs, rubs or gallops. Abdomen: Soft, non-tender, non-distended with normal bowel sounds. Musculoskeletal:  No clubbing, cyanosis or edema bilaterally. Full range of motion without deformity. Skin: Skin color, texture, turgor normal.  No rashes or lesions. Capillary Refill: Brisk,< 3 seconds   Peripheral Pulses: +2 palpable, equal bilaterally       CXR:  I have reviewed the CXR with the following interpretation: No acute ASD   EKG:  I have reviewed the EKG with the following interpretation: No acute ischemic changes. Labs:     Recent Labs     05/28/20  1220   WBC 11.4*   HGB 11.7*   HCT 37.3*   PLT 51*     Recent Labs     05/28/20  1220      K 6.5*      CO2 13*   *   CREATININE 7.3*   CALCIUM 5.3*     Recent Labs     05/28/20  1220   *   ALT 1,248*   BILITOT 1.6*   ALKPHOS 116     No results for input(s): INR in the last 72 hours.   Recent Labs     05/28/20  1220   TROPONINI 0.31*       Urinalysis:      Lab Results   Component Value Date    NITRU Negative 05/28/2020    WBCUA 10-20 05/28/2020    BACTERIA Rare 05/28/2020    RBCUA 5-10 05/28/2020    BLOODU LARGE 05/28/2020    SPECGRAV 1.025

## 2023-03-04 NOTE — PROGRESS NOTES
Hx of diverticulitis, was constipated for about 1 month. Now reports she had a bout of diarrhea and has \"a paste coming out\" of her rectum. No blood in stool. No cp, sob, n/v/f.    Subjective:      Patient ID: Mikhail Herrera is a 82 y.o. male. Mikhail Herrera is a [de-identified] y.o. male. Patient presents with:  Hyperlipidemia  Hypertension      71-year-old male presents for follow-up of multiple medical issues. He has essential hypertension and this is been well-controlled on current medications. He notes no chest pain or shortness of breath. He continues on medication for his hyperlipidemia as well. Hypothyroidism has been well-controlled. He denies any cold or heat dysfunction. He has been having more difficulty with ambulation recently. He notes that he has fallen a couple times. He has slight gait instability. He does not use a walker. He does have a cane but has not been using that as well. The patients PMH, surgical history, family history, medications, allergies were all reviewed and updated as appropriate today. Hypertension         Review of Systems      Objective:   Physical Exam   Constitutional: He is oriented to person, place, and time. He appears well-developed and well-nourished. HENT:   Head: Normocephalic and atraumatic. Right Ear: External ear normal.   Left Ear: External ear normal.   Nose: Nose normal.   Mouth/Throat: Oropharynx is clear and moist.   Eyes: Conjunctivae and EOM are normal. Pupils are equal, round, and reactive to light. Neck: Normal range of motion. Neck supple. Cardiovascular: Normal rate, regular rhythm, normal heart sounds and intact distal pulses. Exam reveals no gallop and no friction rub. No murmur heard. Pulmonary/Chest: Effort normal and breath sounds normal. No respiratory distress. He has no wheezes. Abdominal: Soft. Bowel sounds are normal. He exhibits no distension. There is no tenderness. Musculoskeletal: Normal range of motion. He exhibits no edema or tenderness. Gait is somewhat unsteady. There is some weakness in his legs. Neurological: He is alert and oriented to person, place, and time. He has normal reflexes.    Skin: Skin is warm and dry. Psychiatric: He has a normal mood and affect. His behavior is normal. Judgment and thought content normal.       Assessment:      Encounter Diagnosis   Name Primary?  Essential hypertension, benign Yes           Plan:      1.  Essential hypertension, benign  Continue current meds